# Patient Record
Sex: FEMALE | Race: WHITE | Employment: UNEMPLOYED | URBAN - METROPOLITAN AREA
[De-identification: names, ages, dates, MRNs, and addresses within clinical notes are randomized per-mention and may not be internally consistent; named-entity substitution may affect disease eponyms.]

---

## 2018-03-14 ENCOUNTER — HOSPITAL ENCOUNTER (OUTPATIENT)
Dept: PHYSICAL THERAPY | Age: 38
Setting detail: THERAPIES SERIES
Discharge: HOME | End: 2018-03-14
Payer: COMMERCIAL

## 2018-03-14 PROCEDURE — G0283 ELEC STIM OTHER THAN WOUND: HCPCS

## 2018-03-14 PROCEDURE — 97110 THERAPEUTIC EXERCISES: CPT

## 2018-03-16 ENCOUNTER — HOSPITAL ENCOUNTER (OUTPATIENT)
Dept: PHYSICAL THERAPY | Age: 38
Setting detail: THERAPIES SERIES
Discharge: HOME | End: 2018-03-16
Payer: COMMERCIAL

## 2018-03-16 PROCEDURE — 97110 THERAPEUTIC EXERCISES: CPT

## 2018-03-16 PROCEDURE — G0283 ELEC STIM OTHER THAN WOUND: HCPCS

## 2018-03-21 ENCOUNTER — HOSPITAL ENCOUNTER (OUTPATIENT)
Dept: PHYSICAL THERAPY | Age: 38
Setting detail: THERAPIES SERIES
Discharge: HOME | End: 2018-03-21
Payer: COMMERCIAL

## 2018-03-23 ENCOUNTER — HOSPITAL ENCOUNTER (OUTPATIENT)
Dept: PHYSICAL THERAPY | Age: 38
Setting detail: THERAPIES SERIES
Discharge: HOME | End: 2018-03-23
Payer: COMMERCIAL

## 2018-03-23 PROCEDURE — G0283 ELEC STIM OTHER THAN WOUND: HCPCS

## 2018-03-23 PROCEDURE — 97530 THERAPEUTIC ACTIVITIES: CPT

## 2018-03-26 ENCOUNTER — OFFICE VISIT (OUTPATIENT)
Dept: ORTHOPEDIC SURGERY | Age: 38
End: 2018-03-26
Payer: COMMERCIAL

## 2018-03-26 VITALS — HEIGHT: 63 IN | WEIGHT: 235 LBS | BODY MASS INDEX: 41.64 KG/M2

## 2018-03-26 DIAGNOSIS — M19.019 AC (ACROMIOCLAVICULAR) JOINT ARTHRITIS: Primary | ICD-10-CM

## 2018-03-26 DIAGNOSIS — M75.42 IMPINGEMENT SYNDROME OF LEFT SHOULDER: ICD-10-CM

## 2018-03-26 PROCEDURE — 1036F TOBACCO NON-USER: CPT | Performed by: ORTHOPAEDIC SURGERY

## 2018-03-26 PROCEDURE — G8417 CALC BMI ABV UP PARAM F/U: HCPCS | Performed by: ORTHOPAEDIC SURGERY

## 2018-03-26 PROCEDURE — 99214 OFFICE O/P EST MOD 30 MIN: CPT | Performed by: ORTHOPAEDIC SURGERY

## 2018-03-26 PROCEDURE — G8484 FLU IMMUNIZE NO ADMIN: HCPCS | Performed by: ORTHOPAEDIC SURGERY

## 2018-03-26 PROCEDURE — G8427 DOCREV CUR MEDS BY ELIG CLIN: HCPCS | Performed by: ORTHOPAEDIC SURGERY

## 2018-03-26 NOTE — PROGRESS NOTES
tiZANidine (ZANAFLEX) 4 MG tablet, Take 4 mg by mouth every 6 hours as needed, Disp: , Rfl:     amitriptyline (ELAVIL) 25 MG tablet, Take 25 mg by mouth nightly, Disp: , Rfl:     ibuprofen (ADVIL;MOTRIN) 600 MG tablet, Take 1 tablet by mouth every 6 hours as needed for Pain Take WITH Food / Meals. , Disp: 40 tablet, Rfl: 1    Elastic Bandages & Supports (KNEE BRACE) MISC, Hinged knee brace to left knee, Disp: 1 each, Rfl: 0    omeprazole (PRILOSEC) 40 MG delayed release capsule, Take 40 mg by mouth daily, Disp: , Rfl:     vitamin D (ERGOCALCIFEROL) 73293 UNITS CAPS capsule, Take 50,000 Units by mouth once a week, Disp: , Rfl:     topiramate (TOPAMAX) 50 MG tablet, Take 100 mg by mouth 2 times daily, Disp: , Rfl:     metFORMIN (GLUCOPHAGE) 500 MG tablet, Take 500 mg by mouth 2 times daily (with meals), Disp: , Rfl:     levothyroxine (SYNTHROID) 25 MCG tablet, Take 75 mcg by mouth Daily , Disp: , Rfl:     atenolol (TENORMIN) 25 MG tablet, Take 25 mg by mouth daily 1/2 tab daily, Disp: , Rfl:     traZODone (DESYREL) 50 MG tablet, Take 50 mg by mouth nightly as needed , Disp: , Rfl:     montelukast (SINGULAIR) 10 MG tablet, Take 10 mg by mouth nightly., Disp: , Rfl:     ALBUTEROL IN, Inhale  into the lungs as needed. , Disp: , Rfl:     valACYclovir (VALTREX) 500 MG tablet, Take 500 mg by mouth as needed , Disp: , Rfl:   Allergies   Allergen Reactions    Aspirin Nausea Only and Other (See Comments)     Heart rate goes up    Mobic [Meloxicam] Other (See Comments)     \"Almost a stroke, couldn't talk, right side numbness\"    Tape [Adhesive Tape] Other (See Comments)     \"skin spots\"    Cortisone Hives, Nausea And Vomiting and Other (See Comments)     All steroids; heart rate goes up, massive migraines       Darvocet A500 [Propoxyphene N-Acetaminophen] Hives, Nausea And Vomiting and Other (See Comments)     Massive migraines     Social History     Social History    Marital status:      Spouse name: N/A    Number of children: N/A    Years of education: N/A     Occupational History    Not on file. Social History Main Topics    Smoking status: Former Smoker     Years: 1.00     Types: Cigarettes     Quit date: 7/9/1998    Smokeless tobacco: Not on file    Alcohol use No    Drug use: No    Sexual activity: Not on file     Other Topics Concern    Not on file     Social History Narrative    No narrative on file     Family History   Problem Relation Age of Onset    Diabetes Mother    [place] Migraines Mother     Ovarian Cancer Sister     Mental Illness Other     Arthritis Other     Hypertension Other        Skin: (-) rash,(-) psoriasis,(-) eczema, (-)skin cancer. Musculoskeletal: (-) fractures,  (-) dislocations,(-) collagen vascular disease, (-) fibromyalgia, (-) multiple sclerosis, (-) muscular dystrophy, (-) RSD,(-) joint pain (-)swelling, (-) joint pain,swelling. Neurologic: (-) epilepsy, (-)seizures,(-) brain tumor,(-) TIA, (-)stroke, (-)headaches, (-)Parkinson disease,(-) memory loss, (-) LOC. Cardiovascular: (-) Chest pain, (-) swelling in legs/feet, (-) SOB, (-) cramping in legs/feet with walking. SUBJECTIVE:  _Ht 5' 3\" (1.6 m)   Wt 235 lb (106.6 kg)   BMI 41.63 kg/m²  Vital signs are stable. In general, patient is awake, alert and oriented X3, in no apparent distress. Examination of HENT reveals normocephalic, atraumatic. PERRLA/EOMI sclera are white. Conjunctivae are clear. TM's are intact. Pharynx is pink and moist.  Uvula and tongue are midline. Heart: Positive S1 and positive S2 with regular rate and rhythm. Lungs: Clear to auscultation bilaterally without rales, rhonchi or wheezes. Abdomen: soft, nontender. Positive bowel sounds. No organomegaly. No guarding or rigidity. Constitution:    The patient is alert and oriented x 3, appears to be stated age and in no distress. Ht. 5 ft 3 in., Wt. 235 lbs.     Skin:    Upon inspection: the skin appears warm, dry and intact. There is not a previous scar over the affected area. There is not any cellulitis, lymphedema or cutaneous lesions noted in the lower extremities. Upon palpation there is no induration noted. Neurologic:    Gait: normal;  Motor exam of the upper extremities show: The reflexes in biceps/triceps/brachioradialis are equal and symmetric. Sensory exam C5-T1 are normal bilaterally. Cardiovascular: The vascular exam is normal and is well perfused to distal extremities. There are 2+ radial pulses bilaterally, and motor and sensation is intact to median, ulnar, and radial, musclocutaneus, and axillary nerve distribution and grossly symmetric bilaterally. There is cap refill noted less than two seconds in all digits. There is not edema of the bilateral upper extremities. There is not varicosities noted in the distal extremities. Lymph:    Upon palpation,  there is no lymphadenopathy noted in bilateral upper extremities. Musculoskeletal:    Gait: normal; examination of the nails and digits reveal no cyanosis or clubbing. Cervical Exam:    On physical exam, Monico Pratt is well-developed, well-nourished, oriented to person, place and time. her gait is normal.  On evaluation of her cervical spine, she has full range of motion of the cervical spine without pain. There is no cervical tenderness to palpation. Shoulder Exam:      On evaluation of her bilaterally upper extremities, her left shoulder has no deformity. There is tenderness upon palpation of the There is not evidence of scapular dyskinesis. There is not muscle atrophy in shoulder girdle. The range of motion for the Right Shoulder is 150/45/t10 and for the Left shoulder is 90/30/bl. Right shoulder Motor strength is 5/5 in the supraspinatus, 5/5 internal rotation and 5/5 in external rotation, and Left shoulder motor strength 4-/5 in supraspinatus, 5/5 in internal rotation, 5/5 in external rotation.

## 2018-04-12 ENCOUNTER — ANESTHESIA EVENT (OUTPATIENT)
Dept: OPERATING ROOM | Age: 38
End: 2018-04-12
Payer: COMMERCIAL

## 2018-04-12 ENCOUNTER — HOSPITAL ENCOUNTER (OUTPATIENT)
Age: 38
Setting detail: OUTPATIENT SURGERY
Discharge: HOME | End: 2018-04-12
Attending: ORTHOPAEDIC SURGERY | Admitting: ORTHOPAEDIC SURGERY
Payer: COMMERCIAL

## 2018-04-12 ENCOUNTER — ANESTHESIA (OUTPATIENT)
Dept: OPERATING ROOM | Age: 38
End: 2018-04-12
Payer: COMMERCIAL

## 2018-04-12 VITALS
TEMPERATURE: 98 F | HEIGHT: 63 IN | OXYGEN SATURATION: 100 % | DIASTOLIC BLOOD PRESSURE: 79 MMHG | WEIGHT: 228 LBS | HEART RATE: 69 BPM | SYSTOLIC BLOOD PRESSURE: 115 MMHG | BODY MASS INDEX: 40.4 KG/M2 | RESPIRATION RATE: 16 BRPM

## 2018-04-12 VITALS
TEMPERATURE: 96.6 F | RESPIRATION RATE: 12 BRPM | DIASTOLIC BLOOD PRESSURE: 103 MMHG | OXYGEN SATURATION: 98 % | SYSTOLIC BLOOD PRESSURE: 137 MMHG

## 2018-04-12 PROBLEM — M75.42 IMPINGEMENT SYNDROME OF LEFT SHOULDER: Status: ACTIVE | Noted: 2018-04-12

## 2018-04-12 LAB — GLUCOSE BLD-MCNC: 97 MG/DL

## 2018-04-12 PROCEDURE — 2720000010 HC SURG SUPPLY STERILE: Performed by: ORTHOPAEDIC SURGERY

## 2018-04-12 PROCEDURE — 2580000003 HC RX 258: Performed by: ANESTHESIOLOGY

## 2018-04-12 PROCEDURE — 29823 SHO ARTHRS SRG XTNSV DBRDMT: CPT | Performed by: ORTHOPAEDIC SURGERY

## 2018-04-12 PROCEDURE — 3700000000 HC ANESTHESIA ATTENDED CARE: Performed by: ORTHOPAEDIC SURGERY

## 2018-04-12 PROCEDURE — 6360000002 HC RX W HCPCS: Performed by: ORTHOPAEDIC SURGERY

## 2018-04-12 PROCEDURE — 7100000001 HC PACU RECOVERY - ADDTL 15 MIN: Performed by: ORTHOPAEDIC SURGERY

## 2018-04-12 PROCEDURE — 6360000002 HC RX W HCPCS: Performed by: NURSE ANESTHETIST, CERTIFIED REGISTERED

## 2018-04-12 PROCEDURE — 2500000003 HC RX 250 WO HCPCS: Performed by: ORTHOPAEDIC SURGERY

## 2018-04-12 PROCEDURE — 29826 SHO ARTHRS SRG DECOMPRESSION: CPT | Performed by: ORTHOPAEDIC SURGERY

## 2018-04-12 PROCEDURE — 29824 SHO ARTHRS SRG DSTL CLAVICLC: CPT | Performed by: ORTHOPAEDIC SURGERY

## 2018-04-12 PROCEDURE — 2500000003 HC RX 250 WO HCPCS: Performed by: NURSE ANESTHETIST, CERTIFIED REGISTERED

## 2018-04-12 PROCEDURE — 6360000002 HC RX W HCPCS: Performed by: ANESTHESIOLOGY

## 2018-04-12 PROCEDURE — 2580000003 HC RX 258

## 2018-04-12 PROCEDURE — 7100000011 HC PHASE II RECOVERY - ADDTL 15 MIN: Performed by: ORTHOPAEDIC SURGERY

## 2018-04-12 PROCEDURE — 3600000014 HC SURGERY LEVEL 4 ADDTL 15MIN: Performed by: ORTHOPAEDIC SURGERY

## 2018-04-12 PROCEDURE — 7100000000 HC PACU RECOVERY - FIRST 15 MIN: Performed by: ORTHOPAEDIC SURGERY

## 2018-04-12 PROCEDURE — 82962 GLUCOSE BLOOD TEST: CPT | Performed by: ORTHOPAEDIC SURGERY

## 2018-04-12 PROCEDURE — 81025 URINE PREGNANCY TEST: CPT | Performed by: ORTHOPAEDIC SURGERY

## 2018-04-12 PROCEDURE — 7100000010 HC PHASE II RECOVERY - FIRST 15 MIN: Performed by: ORTHOPAEDIC SURGERY

## 2018-04-12 PROCEDURE — 3600000004 HC SURGERY LEVEL 4 BASE: Performed by: ORTHOPAEDIC SURGERY

## 2018-04-12 PROCEDURE — 3700000001 HC ADD 15 MINUTES (ANESTHESIA): Performed by: ORTHOPAEDIC SURGERY

## 2018-04-12 RX ORDER — GLYCOPYRROLATE 1 MG/5 ML
SYRINGE (ML) INTRAVENOUS PRN
Status: DISCONTINUED | OUTPATIENT
Start: 2018-04-12 | End: 2018-04-12 | Stop reason: SDUPTHER

## 2018-04-12 RX ORDER — ONDANSETRON 2 MG/ML
INJECTION INTRAMUSCULAR; INTRAVENOUS PRN
Status: DISCONTINUED | OUTPATIENT
Start: 2018-04-12 | End: 2018-04-12 | Stop reason: SDUPTHER

## 2018-04-12 RX ORDER — SODIUM CHLORIDE, SODIUM LACTATE, POTASSIUM CHLORIDE, CALCIUM CHLORIDE 600; 310; 30; 20 MG/100ML; MG/100ML; MG/100ML; MG/100ML
INJECTION, SOLUTION INTRAVENOUS CONTINUOUS
Status: DISCONTINUED | OUTPATIENT
Start: 2018-04-12 | End: 2018-04-12 | Stop reason: HOSPADM

## 2018-04-12 RX ORDER — SODIUM CHLORIDE, SODIUM LACTATE, POTASSIUM CHLORIDE, CALCIUM CHLORIDE 600; 310; 30; 20 MG/100ML; MG/100ML; MG/100ML; MG/100ML
INJECTION, SOLUTION INTRAVENOUS CONTINUOUS PRN
Status: DISCONTINUED | OUTPATIENT
Start: 2018-04-12 | End: 2018-04-12 | Stop reason: SDUPTHER

## 2018-04-12 RX ORDER — LIDOCAINE HYDROCHLORIDE 20 MG/ML
INJECTION, SOLUTION INFILTRATION; PERINEURAL PRN
Status: DISCONTINUED | OUTPATIENT
Start: 2018-04-12 | End: 2018-04-12 | Stop reason: SDUPTHER

## 2018-04-12 RX ORDER — MORPHINE SULFATE 2 MG/ML
1 INJECTION, SOLUTION INTRAMUSCULAR; INTRAVENOUS EVERY 5 MIN PRN
Status: DISCONTINUED | OUTPATIENT
Start: 2018-04-12 | End: 2018-04-12 | Stop reason: HOSPADM

## 2018-04-12 RX ORDER — LABETALOL HYDROCHLORIDE 5 MG/ML
5 INJECTION, SOLUTION INTRAVENOUS EVERY 10 MIN PRN
Status: DISCONTINUED | OUTPATIENT
Start: 2018-04-12 | End: 2018-04-12 | Stop reason: HOSPADM

## 2018-04-12 RX ORDER — PROPOFOL 10 MG/ML
INJECTION, EMULSION INTRAVENOUS PRN
Status: DISCONTINUED | OUTPATIENT
Start: 2018-04-12 | End: 2018-04-12 | Stop reason: SDUPTHER

## 2018-04-12 RX ORDER — LABETALOL HYDROCHLORIDE 5 MG/ML
INJECTION, SOLUTION INTRAVENOUS PRN
Status: DISCONTINUED | OUTPATIENT
Start: 2018-04-12 | End: 2018-04-12 | Stop reason: SDUPTHER

## 2018-04-12 RX ORDER — FENTANYL CITRATE 50 UG/ML
50 INJECTION, SOLUTION INTRAMUSCULAR; INTRAVENOUS EVERY 5 MIN PRN
Status: DISCONTINUED | OUTPATIENT
Start: 2018-04-12 | End: 2018-04-12 | Stop reason: HOSPADM

## 2018-04-12 RX ORDER — MIDAZOLAM HYDROCHLORIDE 1 MG/ML
INJECTION INTRAMUSCULAR; INTRAVENOUS PRN
Status: DISCONTINUED | OUTPATIENT
Start: 2018-04-12 | End: 2018-04-12 | Stop reason: SDUPTHER

## 2018-04-12 RX ORDER — HYDRALAZINE HYDROCHLORIDE 20 MG/ML
5 INJECTION INTRAMUSCULAR; INTRAVENOUS EVERY 10 MIN PRN
Status: DISCONTINUED | OUTPATIENT
Start: 2018-04-12 | End: 2018-04-12 | Stop reason: HOSPADM

## 2018-04-12 RX ORDER — BUPIVACAINE HYDROCHLORIDE AND EPINEPHRINE 2.5; 5 MG/ML; UG/ML
INJECTION, SOLUTION EPIDURAL; INFILTRATION; INTRACAUDAL; PERINEURAL PRN
Status: DISCONTINUED | OUTPATIENT
Start: 2018-04-12 | End: 2018-04-12 | Stop reason: HOSPADM

## 2018-04-12 RX ORDER — PROMETHAZINE HYDROCHLORIDE 25 MG/ML
25 INJECTION, SOLUTION INTRAMUSCULAR; INTRAVENOUS
Status: DISCONTINUED | OUTPATIENT
Start: 2018-04-12 | End: 2018-04-12 | Stop reason: HOSPADM

## 2018-04-12 RX ORDER — NEOSTIGMINE METHYLSULFATE 1 MG/ML
INJECTION, SOLUTION INTRAVENOUS PRN
Status: DISCONTINUED | OUTPATIENT
Start: 2018-04-12 | End: 2018-04-12 | Stop reason: SDUPTHER

## 2018-04-12 RX ORDER — MEPERIDINE HYDROCHLORIDE 25 MG/ML
12.5 INJECTION INTRAMUSCULAR; INTRAVENOUS; SUBCUTANEOUS EVERY 5 MIN PRN
Status: DISCONTINUED | OUTPATIENT
Start: 2018-04-12 | End: 2018-04-12 | Stop reason: HOSPADM

## 2018-04-12 RX ORDER — ROCURONIUM BROMIDE 10 MG/ML
INJECTION, SOLUTION INTRAVENOUS PRN
Status: DISCONTINUED | OUTPATIENT
Start: 2018-04-12 | End: 2018-04-12 | Stop reason: SDUPTHER

## 2018-04-12 RX ORDER — OXYCODONE HYDROCHLORIDE AND ACETAMINOPHEN 5; 325 MG/1; MG/1
2 TABLET ORAL EVERY 4 HOURS PRN
Status: DISCONTINUED | OUTPATIENT
Start: 2018-04-12 | End: 2018-04-12 | Stop reason: HOSPADM

## 2018-04-12 RX ORDER — FENTANYL CITRATE 50 UG/ML
INJECTION, SOLUTION INTRAMUSCULAR; INTRAVENOUS PRN
Status: DISCONTINUED | OUTPATIENT
Start: 2018-04-12 | End: 2018-04-12 | Stop reason: SDUPTHER

## 2018-04-12 RX ORDER — DIPHENHYDRAMINE HYDROCHLORIDE 50 MG/ML
12.5 INJECTION INTRAMUSCULAR; INTRAVENOUS
Status: DISCONTINUED | OUTPATIENT
Start: 2018-04-12 | End: 2018-04-12 | Stop reason: HOSPADM

## 2018-04-12 RX ADMIN — SODIUM CHLORIDE, POTASSIUM CHLORIDE, SODIUM LACTATE AND CALCIUM CHLORIDE: 600; 310; 30; 20 INJECTION, SOLUTION INTRAVENOUS at 12:34

## 2018-04-12 RX ADMIN — FENTANYL CITRATE 50 MCG: 50 INJECTION, SOLUTION INTRAMUSCULAR; INTRAVENOUS at 13:12

## 2018-04-12 RX ADMIN — SODIUM CHLORIDE, SODIUM LACTATE, POTASSIUM CHLORIDE, CALCIUM CHLORIDE: 600; 310; 30; 20 INJECTION, SOLUTION INTRAVENOUS at 12:40

## 2018-04-12 RX ADMIN — Medication 0.2 MG: at 13:13

## 2018-04-12 RX ADMIN — Medication 0.6 MG: at 13:41

## 2018-04-12 RX ADMIN — ONDANSETRON 4 MG: 2 INJECTION, SOLUTION INTRAMUSCULAR; INTRAVENOUS at 13:25

## 2018-04-12 RX ADMIN — MIDAZOLAM HYDROCHLORIDE 2 MG: 1 INJECTION INTRAMUSCULAR; INTRAVENOUS at 12:44

## 2018-04-12 RX ADMIN — Medication 3 MG: at 13:41

## 2018-04-12 RX ADMIN — FENTANYL CITRATE 100 MCG: 50 INJECTION, SOLUTION INTRAMUSCULAR; INTRAVENOUS at 12:46

## 2018-04-12 RX ADMIN — FENTANYL CITRATE 50 MCG: 50 INJECTION, SOLUTION INTRAMUSCULAR; INTRAVENOUS at 13:23

## 2018-04-12 RX ADMIN — MORPHINE SULFATE 1 MG: 2 INJECTION, SOLUTION INTRAMUSCULAR; INTRAVENOUS at 14:08

## 2018-04-12 RX ADMIN — LABETALOL HYDROCHLORIDE 10 MG: 5 INJECTION, SOLUTION INTRAVENOUS at 13:26

## 2018-04-12 RX ADMIN — LIDOCAINE HYDROCHLORIDE 30 MG: 20 INJECTION, SOLUTION INFILTRATION; PERINEURAL at 12:46

## 2018-04-12 RX ADMIN — PROPOFOL 200 MG: 10 INJECTION, EMULSION INTRAVENOUS at 12:46

## 2018-04-12 RX ADMIN — ROCURONIUM BROMIDE 35 MG: 10 INJECTION, SOLUTION INTRAVENOUS at 12:46

## 2018-04-12 RX ADMIN — FENTANYL CITRATE 50 MCG: 50 INJECTION, SOLUTION INTRAMUSCULAR; INTRAVENOUS at 13:00

## 2018-04-12 RX ADMIN — LABETALOL HYDROCHLORIDE 10 MG: 5 INJECTION, SOLUTION INTRAVENOUS at 13:18

## 2018-04-12 RX ADMIN — CEFAZOLIN SODIUM 2 G: 2 SOLUTION INTRAVENOUS at 12:42

## 2018-04-12 RX ADMIN — MORPHINE SULFATE 1 MG: 2 INJECTION, SOLUTION INTRAMUSCULAR; INTRAVENOUS at 14:13

## 2018-04-12 ASSESSMENT — PAIN DESCRIPTION - PAIN TYPE
TYPE: SURGICAL PAIN

## 2018-04-12 ASSESSMENT — PULMONARY FUNCTION TESTS
PIF_VALUE: 20
PIF_VALUE: 19
PIF_VALUE: 20
PIF_VALUE: 0
PIF_VALUE: 20
PIF_VALUE: 19
PIF_VALUE: 20
PIF_VALUE: 19
PIF_VALUE: 21
PIF_VALUE: 19
PIF_VALUE: 4
PIF_VALUE: 20
PIF_VALUE: 19
PIF_VALUE: 21
PIF_VALUE: 0
PIF_VALUE: 19
PIF_VALUE: 20
PIF_VALUE: 19
PIF_VALUE: 22
PIF_VALUE: 20
PIF_VALUE: 20
PIF_VALUE: 25
PIF_VALUE: 20
PIF_VALUE: 20
PIF_VALUE: 18
PIF_VALUE: 24
PIF_VALUE: 20
PIF_VALUE: 19
PIF_VALUE: 2
PIF_VALUE: 20
PIF_VALUE: 21
PIF_VALUE: 21
PIF_VALUE: 19
PIF_VALUE: 20
PIF_VALUE: 19
PIF_VALUE: 20
PIF_VALUE: 19
PIF_VALUE: 18
PIF_VALUE: 0
PIF_VALUE: 4
PIF_VALUE: 20
PIF_VALUE: 19
PIF_VALUE: 19
PIF_VALUE: 20
PIF_VALUE: 21
PIF_VALUE: 20
PIF_VALUE: 19

## 2018-04-12 ASSESSMENT — PAIN DESCRIPTION - DESCRIPTORS
DESCRIPTORS: ACHING
DESCRIPTORS: ACHING;DISCOMFORT
DESCRIPTORS: DISCOMFORT

## 2018-04-12 ASSESSMENT — PAIN SCALES - GENERAL
PAINLEVEL_OUTOF10: 8
PAINLEVEL_OUTOF10: 6
PAINLEVEL_OUTOF10: 6
PAINLEVEL_OUTOF10: 7

## 2018-04-12 ASSESSMENT — PAIN DESCRIPTION - ORIENTATION
ORIENTATION: LEFT

## 2018-04-12 ASSESSMENT — PAIN DESCRIPTION - LOCATION
LOCATION: SHOULDER
LOCATION: SHOULDER

## 2018-04-12 ASSESSMENT — LIFESTYLE VARIABLES: SMOKING_STATUS: 0

## 2018-04-12 ASSESSMENT — PAIN - FUNCTIONAL ASSESSMENT: PAIN_FUNCTIONAL_ASSESSMENT: 0-10

## 2018-04-12 ASSESSMENT — PAIN DESCRIPTION - FREQUENCY: FREQUENCY: CONTINUOUS

## 2018-04-12 ASSESSMENT — PAIN DESCRIPTION - PROGRESSION: CLINICAL_PROGRESSION: GRADUALLY IMPROVING

## 2018-04-25 DIAGNOSIS — M75.42 IMPINGEMENT SYNDROME OF LEFT SHOULDER: Primary | ICD-10-CM

## 2018-04-26 ENCOUNTER — OFFICE VISIT (OUTPATIENT)
Dept: ORTHOPEDIC SURGERY | Age: 38
End: 2018-04-26

## 2018-04-26 VITALS — WEIGHT: 228 LBS | BODY MASS INDEX: 41.96 KG/M2 | TEMPERATURE: 98 F | HEIGHT: 62 IN

## 2018-04-26 DIAGNOSIS — M75.112 INCOMPLETE TEAR OF LEFT ROTATOR CUFF: ICD-10-CM

## 2018-04-26 DIAGNOSIS — M75.42 IMPINGEMENT SYNDROME OF LEFT SHOULDER: Primary | ICD-10-CM

## 2018-04-26 DIAGNOSIS — M19.019 AC (ACROMIOCLAVICULAR) JOINT ARTHRITIS: ICD-10-CM

## 2018-04-26 PROCEDURE — 99024 POSTOP FOLLOW-UP VISIT: CPT | Performed by: ORTHOPAEDIC SURGERY

## 2018-06-19 ENCOUNTER — HOSPITAL ENCOUNTER (OUTPATIENT)
Age: 38
Discharge: HOME | End: 2018-06-19
Payer: COMMERCIAL

## 2018-06-19 LAB
RHEUMATOID FACTOR: <10 IU/ML (ref 0–13)
SEDIMENTATION RATE, ERYTHROCYTE: 5 MM/HR (ref 0–20)
URIC ACID, SERUM: 3.4 MG/DL (ref 2.4–5.7)

## 2018-06-19 PROCEDURE — 86431 RHEUMATOID FACTOR QUANT: CPT

## 2018-06-19 PROCEDURE — 84550 ASSAY OF BLOOD/URIC ACID: CPT

## 2018-06-19 PROCEDURE — 86039 ANTINUCLEAR ANTIBODIES (ANA): CPT

## 2018-06-19 PROCEDURE — 85651 RBC SED RATE NONAUTOMATED: CPT

## 2018-06-19 PROCEDURE — 36415 COLL VENOUS BLD VENIPUNCTURE: CPT

## 2018-06-19 PROCEDURE — 86038 ANTINUCLEAR ANTIBODIES: CPT

## 2018-06-20 LAB
ANA PATTERN: ABNORMAL
ANA TITER: ABNORMAL
ANTI-NUCLEAR ANTIBODY (ANA): POSITIVE

## 2018-07-31 ENCOUNTER — APPOINTMENT (OUTPATIENT)
Dept: GENERAL RADIOLOGY | Age: 38
End: 2018-07-31
Payer: COMMERCIAL

## 2018-07-31 ENCOUNTER — HOSPITAL ENCOUNTER (EMERGENCY)
Age: 38
Discharge: HOME | End: 2018-07-31
Attending: EMERGENCY MEDICINE
Payer: COMMERCIAL

## 2018-07-31 VITALS
OXYGEN SATURATION: 100 % | BODY MASS INDEX: 40.48 KG/M2 | TEMPERATURE: 97.8 F | HEART RATE: 59 BPM | HEIGHT: 62 IN | WEIGHT: 220 LBS | DIASTOLIC BLOOD PRESSURE: 69 MMHG | SYSTOLIC BLOOD PRESSURE: 117 MMHG | RESPIRATION RATE: 14 BRPM

## 2018-07-31 DIAGNOSIS — R07.89 COSTOCHONDRAL CHEST PAIN: Primary | ICD-10-CM

## 2018-07-31 LAB
ALBUMIN SERPL-MCNC: 3.9 G/DL (ref 3.5–5.2)
ALP BLD-CCNC: 62 U/L (ref 35–104)
ALT SERPL-CCNC: 12 U/L (ref 0–32)
ANION GAP SERPL CALCULATED.3IONS-SCNC: 12 MMOL/L (ref 7–16)
AST SERPL-CCNC: 15 U/L (ref 0–31)
BASOPHILS ABSOLUTE: 0.02 E9/L (ref 0–0.2)
BASOPHILS RELATIVE PERCENT: 0.4 % (ref 0–2)
BILIRUB SERPL-MCNC: 0.3 MG/DL (ref 0–1.2)
BUN BLDV-MCNC: 14 MG/DL (ref 6–20)
CALCIUM SERPL-MCNC: 9.3 MG/DL (ref 8.6–10.2)
CHLORIDE BLD-SCNC: 103 MMOL/L (ref 98–107)
CO2: 22 MMOL/L (ref 22–29)
CREAT SERPL-MCNC: 1 MG/DL (ref 0.5–1)
D DIMER: 257 NG/ML DDU
EKG ATRIAL RATE: 52 BPM
EKG P AXIS: 8 DEGREES
EKG P-R INTERVAL: 152 MS
EKG Q-T INTERVAL: 448 MS
EKG QRS DURATION: 78 MS
EKG QTC CALCULATION (BAZETT): 416 MS
EKG R AXIS: -3 DEGREES
EKG T AXIS: 7 DEGREES
EKG VENTRICULAR RATE: 52 BPM
EOSINOPHILS ABSOLUTE: 0.16 E9/L (ref 0.05–0.5)
EOSINOPHILS RELATIVE PERCENT: 3.1 % (ref 0–6)
GFR AFRICAN AMERICAN: >60
GFR NON-AFRICAN AMERICAN: >60 ML/MIN/1.73
GLUCOSE BLD-MCNC: 103 MG/DL (ref 74–109)
HCG QUALITATIVE: NEGATIVE
HCT VFR BLD CALC: 41.8 % (ref 34–48)
HEMOGLOBIN: 13.6 G/DL (ref 11.5–15.5)
IMMATURE GRANULOCYTES #: 0.02 E9/L
IMMATURE GRANULOCYTES %: 0.4 % (ref 0–5)
INR BLD: 1.2
LYMPHOCYTES ABSOLUTE: 0.89 E9/L (ref 1.5–4)
LYMPHOCYTES RELATIVE PERCENT: 17.3 % (ref 20–42)
MAGNESIUM: 2 MG/DL (ref 1.6–2.6)
MCH RBC QN AUTO: 30.9 PG (ref 26–35)
MCHC RBC AUTO-ENTMCNC: 32.5 % (ref 32–34.5)
MCV RBC AUTO: 95 FL (ref 80–99.9)
MONOCYTES ABSOLUTE: 0.32 E9/L (ref 0.1–0.95)
MONOCYTES RELATIVE PERCENT: 6.2 % (ref 2–12)
NEUTROPHILS ABSOLUTE: 3.72 E9/L (ref 1.8–7.3)
NEUTROPHILS RELATIVE PERCENT: 72.6 % (ref 43–80)
PDW BLD-RTO: 12.9 FL (ref 11.5–15)
PLATELET # BLD: 180 E9/L (ref 130–450)
PMV BLD AUTO: 12.2 FL (ref 7–12)
POTASSIUM SERPL-SCNC: 4.4 MMOL/L (ref 3.5–5)
PRO-BNP: 161 PG/ML (ref 0–125)
PROTHROMBIN TIME: 13.6 SEC (ref 9.3–12.4)
RBC # BLD: 4.4 E12/L (ref 3.5–5.5)
SODIUM BLD-SCNC: 137 MMOL/L (ref 132–146)
TOTAL PROTEIN: 6.8 G/DL (ref 6.4–8.3)
TROPONIN: <0.01 NG/ML (ref 0–0.03)
WBC # BLD: 5.1 E9/L (ref 4.5–11.5)

## 2018-07-31 PROCEDURE — 85378 FIBRIN DEGRADE SEMIQUANT: CPT

## 2018-07-31 PROCEDURE — 84703 CHORIONIC GONADOTROPIN ASSAY: CPT

## 2018-07-31 PROCEDURE — 83880 ASSAY OF NATRIURETIC PEPTIDE: CPT

## 2018-07-31 PROCEDURE — 93005 ELECTROCARDIOGRAM TRACING: CPT | Performed by: EMERGENCY MEDICINE

## 2018-07-31 PROCEDURE — 83735 ASSAY OF MAGNESIUM: CPT

## 2018-07-31 PROCEDURE — 85610 PROTHROMBIN TIME: CPT

## 2018-07-31 PROCEDURE — 84484 ASSAY OF TROPONIN QUANT: CPT

## 2018-07-31 PROCEDURE — 85025 COMPLETE CBC W/AUTO DIFF WBC: CPT

## 2018-07-31 PROCEDURE — 80053 COMPREHEN METABOLIC PANEL: CPT

## 2018-07-31 PROCEDURE — 71045 X-RAY EXAM CHEST 1 VIEW: CPT

## 2018-07-31 PROCEDURE — 99285 EMERGENCY DEPT VISIT HI MDM: CPT

## 2018-07-31 RX ORDER — ONDANSETRON 2 MG/ML
4 INJECTION INTRAMUSCULAR; INTRAVENOUS ONCE
Status: DISCONTINUED | OUTPATIENT
Start: 2018-07-31 | End: 2018-07-31 | Stop reason: HOSPADM

## 2018-07-31 RX ORDER — BUSPIRONE HYDROCHLORIDE 15 MG/1
15 TABLET ORAL 3 TIMES DAILY
COMMUNITY
End: 2019-12-20 | Stop reason: ALTCHOICE

## 2018-07-31 ASSESSMENT — PAIN SCALES - GENERAL: PAINLEVEL_OUTOF10: 6

## 2018-07-31 ASSESSMENT — PAIN DESCRIPTION - LOCATION: LOCATION: CHEST

## 2018-07-31 ASSESSMENT — PAIN DESCRIPTION - PAIN TYPE: TYPE: ACUTE PAIN

## 2018-07-31 NOTE — ED NOTES
Patient discharged with belongings. Discussed care instructions, follow-up instructions and when to return to the hospital. Patient verbalizes understanding and has no further questions at this time. Electronically signed by Dell Forbes.  BLANCA Martinez on 7/31/2018 at 4:24 PM       Danika Cleary RN  07/31/18 [id number]

## 2018-07-31 NOTE — ED PROVIDER NOTES
04/12/2018); and pr shoulder scope bone shaving (Left, 4/12/2018). Social History:  reports that she quit smoking about 20 years ago. Her smoking use included Cigarettes. She quit after 1.00 year of use. She has never used smokeless tobacco. She reports that she does not drink alcohol or use drugs. Family History: family history includes Arthritis in an other family member; Diabetes in her mother; Hypertension in an other family member; Mental Illness in an other family member; Migraines in her mother; Ovarian Cancer in her sister. The patients home medications have been reviewed. Allergies: Aspirin; Mobic [meloxicam]; Tape [adhesive tape];  Cortisone; and Darvocet a500 [propoxyphene n-acetaminophen]    -------------------------------------------------- RESULTS -------------------------------------------------  All laboratory and radiology results have been personally reviewed by myself   LABS:  Results for orders placed or performed during the hospital encounter of 07/31/18   CBC Auto Differential   Result Value Ref Range    WBC 5.1 4.5 - 11.5 E9/L    RBC 4.40 3.50 - 5.50 E12/L    Hemoglobin 13.6 11.5 - 15.5 g/dL    Hematocrit 41.8 34.0 - 48.0 %    MCV 95.0 80.0 - 99.9 fL    MCH 30.9 26.0 - 35.0 pg    MCHC 32.5 32.0 - 34.5 %    RDW 12.9 11.5 - 15.0 fL    Platelets 787 159 - 326 E9/L    MPV 12.2 (H) 7.0 - 12.0 fL    Neutrophils % 72.6 43.0 - 80.0 %    Immature Granulocytes % 0.4 0.0 - 5.0 %    Lymphocytes % 17.3 (L) 20.0 - 42.0 %    Monocytes % 6.2 2.0 - 12.0 %    Eosinophils % 3.1 0.0 - 6.0 %    Basophils % 0.4 0.0 - 2.0 %    Neutrophils # 3.72 1.80 - 7.30 E9/L    Immature Granulocytes # 0.02 E9/L    Lymphocytes # 0.89 (L) 1.50 - 4.00 E9/L    Monocytes # 0.32 0.10 - 0.95 E9/L    Eosinophils # 0.16 0.05 - 0.50 E9/L    Basophils # 0.02 0.00 - 0.20 E9/L   Comprehensive Metabolic Panel   Result Value Ref Range    Sodium 137 132 - 146 mmol/L    Potassium 4.4 3.5 - 5.0 mmol/L    Chloride 103 98 - 107 mmol/L Oropharynx clear, handling secretions, no trismus  Neck: Supple, full ROM,   Pulmonary: Lungs clear to auscultation bilaterally, no wheezes, rales, or rhonchi. Not in respiratory distress  Cardiovascular:  Regular rate and rhythm, no murmurs, gallops, or rubs. 2+ distal pulses  Abdomen: Soft, non tender, non distended,   Extremities: Moves all extremities x 4. Warm and well perfused  Skin: warm and dry without rash  Neurologic: GCS 15,  Psych: Normal Affect      ------------------------------ ED COURSE/MEDICAL DECISION MAKING----------------------  Medications - No data to display  EKG: This EKG is signed and interpreted by me. Time: 1438  Rate: 52  Rhythm: Sinus  Interpretation: no acute changes  Comparison: no previous EKG available      ED COURSE:       Medical Decision Making:    Patient presents with costochondritis chest pain this is reproducible chest pain. We did blood work and EKG seemed stable. We recommended continuing NSAIDs and follow up with primary care physician within 2-3 days for reevaluation. We gave the patient warning signs for when to return. Counseling: The emergency provider has spoken with the patient and discussed todays results, in addition to providing specific details for the plan of care and counseling regarding the diagnosis and prognosis. Questions are answered at this time and they are agreeable with the plan.      --------------------------------- IMPRESSION AND DISPOSITION ---------------------------------    IMPRESSION  1. Costochondral chest pain        DISPOSITION  Disposition: Discharge to home  Patient condition is good      NOTE: This report was transcribed using voice recognition software.  Every effort was made to ensure accuracy; however, inadvertent computerized transcription errors may be present       Silvestre Grissom DO  08/01/18 [id number]

## 2018-08-30 ENCOUNTER — OFFICE VISIT (OUTPATIENT)
Dept: CARDIOLOGY CLINIC | Age: 38
End: 2018-08-30
Payer: COMMERCIAL

## 2018-08-30 VITALS
SYSTOLIC BLOOD PRESSURE: 102 MMHG | WEIGHT: 225 LBS | HEIGHT: 63 IN | BODY MASS INDEX: 39.87 KG/M2 | HEART RATE: 68 BPM | DIASTOLIC BLOOD PRESSURE: 64 MMHG | RESPIRATION RATE: 18 BRPM

## 2018-08-30 DIAGNOSIS — J45.909 REACTIVE AIRWAY DISEASE WITHOUT COMPLICATION, UNSPECIFIED ASTHMA SEVERITY, UNSPECIFIED WHETHER PERSISTENT: ICD-10-CM

## 2018-08-30 DIAGNOSIS — E78.00 PURE HYPERCHOLESTEROLEMIA: ICD-10-CM

## 2018-08-30 DIAGNOSIS — E11.9 TYPE 2 DIABETES MELLITUS WITHOUT COMPLICATION, WITHOUT LONG-TERM CURRENT USE OF INSULIN (HCC): ICD-10-CM

## 2018-08-30 DIAGNOSIS — I10 ESSENTIAL HYPERTENSION: ICD-10-CM

## 2018-08-30 DIAGNOSIS — E66.01 MORBID OBESITY (HCC): ICD-10-CM

## 2018-08-30 DIAGNOSIS — R07.89 ATYPICAL CHEST PAIN: Primary | ICD-10-CM

## 2018-08-30 PROCEDURE — 93000 ELECTROCARDIOGRAM COMPLETE: CPT | Performed by: INTERNAL MEDICINE

## 2018-08-30 PROCEDURE — 2022F DILAT RTA XM EVC RTNOPTHY: CPT | Performed by: INTERNAL MEDICINE

## 2018-08-30 PROCEDURE — 99204 OFFICE O/P NEW MOD 45 MIN: CPT | Performed by: INTERNAL MEDICINE

## 2018-08-30 PROCEDURE — G8417 CALC BMI ABV UP PARAM F/U: HCPCS | Performed by: INTERNAL MEDICINE

## 2018-08-30 PROCEDURE — 1036F TOBACCO NON-USER: CPT | Performed by: INTERNAL MEDICINE

## 2018-08-30 PROCEDURE — 3046F HEMOGLOBIN A1C LEVEL >9.0%: CPT | Performed by: INTERNAL MEDICINE

## 2018-08-30 PROCEDURE — G8427 DOCREV CUR MEDS BY ELIG CLIN: HCPCS | Performed by: INTERNAL MEDICINE

## 2018-08-30 RX ORDER — ALBUTEROL SULFATE 90 UG/1
AEROSOL, METERED RESPIRATORY (INHALATION)
COMMUNITY
Start: 2017-12-03 | End: 2019-08-23

## 2018-08-30 NOTE — PROGRESS NOTES
status: Former Smoker     Years: 1.00     Types: Cigarettes     Quit date: 7/9/1998    Smokeless tobacco: Never Used    Alcohol use No      Comment: Socially. Coffee rarely     Drug use: No    Sexual activity: Not on file     Other Topics Concern    Not on file     Social History Narrative    No narrative on file       Allergies: Allergies   Allergen Reactions    Aspirin Nausea Only and Other (See Comments)     Heart rate goes up    Gabapentin      Dizziness    Mobic [Meloxicam] Other (See Comments)     \"Almost a stroke, couldn't talk, right side numbness\"    Propoxyphene      hives    Tape [Adhesive Tape] Other (See Comments)     \"skin spots\"    Cortisone Hives, Nausea And Vomiting and Other (See Comments)     All steroids; heart rate goes up, massive migraines       Darvocet A500 [Propoxyphene N-Acetaminophen] Hives, Nausea And Vomiting and Other (See Comments)     Massive migraines       Current Medications:  Current Outpatient Prescriptions   Medication Sig Dispense Refill    albuterol sulfate HFA (VENTOLIN HFA) 108 (90 Base) MCG/ACT inhaler Inhale into the lungs      busPIRone (BUSPAR) 15 MG tablet Take 15 mg by mouth 3 times daily      diclofenac sodium 1 % GEL Apply 2 g topically 4 times daily as needed for Pain      etodolac (LODINE) 300 MG capsule Take 300 mg by mouth every 8 hours      oxyCODONE-acetaminophen (PERCOCET) 5-325 MG per tablet Take 1 tablet by mouth every 4 hours as needed for Pain .       tiZANidine (ZANAFLEX) 4 MG tablet Take 4 mg by mouth every 6 hours as needed      amitriptyline (ELAVIL) 25 MG tablet Take 25 mg by mouth nightly      Elastic Bandages & Supports (KNEE BRACE) MISC Hinged knee brace to left knee 1 each 0    omeprazole (PRILOSEC) 40 MG delayed release capsule Take 40 mg by mouth daily      vitamin D (ERGOCALCIFEROL) 26530 UNITS CAPS capsule Take 50,000 Units by mouth once a week      topiramate (TOPAMAX) 50 MG tablet Take 100 mg by mouth 2 times daily  metFORMIN (GLUCOPHAGE) 500 MG tablet Take 500 mg by mouth 2 times daily (with meals)      levothyroxine (SYNTHROID) 25 MCG tablet Take 112 mcg by mouth Daily       atenolol (TENORMIN) 25 MG tablet Take 25 mg by mouth daily 1/2 tab daily      traZODone (DESYREL) 50 MG tablet Take 50 mg by mouth nightly as needed       montelukast (SINGULAIR) 10 MG tablet Take 10 mg by mouth nightly.  ALBUTEROL IN Inhale  into the lungs as needed.  valACYclovir (VALTREX) 500 MG tablet Take 500 mg by mouth as needed        Current Facility-Administered Medications   Medication Dose Route Frequency Provider Last Rate Last Dose    ceFAZolin (ANCEF) 2 g in dextrose 5% 100 mL IVPB  2 g Intravenous Once Lisa Hyde DO             Physical Exam:  /64   Pulse 68   Resp 18   Ht 5' 3\" (1.6 m)   Wt 225 lb (102.1 kg)   LMP 07/10/2018   BMI 39.86 kg/m²   Wt Readings from Last 3 Encounters:   08/30/18 225 lb (102.1 kg)   07/31/18 220 lb (99.8 kg)   04/26/18 228 lb (103.4 kg)     The patient is awake, alert and in no discomfort or distress. No gross musculoskeletal deformity or lymphadenopathy are present. No significant skin or nail changes are present. Gross examination of head, eyes, nose and throat are negative. Jugular venous pressure is normal and no carotid bruits are present. No thyromegaly is noted. Normal respiratory effort is noted with no accessory muscle usage present. Lung fields are clear to ascultation. Cardiac examination is notable for a regular rate and rhythm with no palpable thrill. No gallop rhythm or cardiac murmur are identified. A benign abdominal examination is present with the exception of obesity and no masses or organomegaly. A normal abdominal aortic pulsation is present. Intact pulses are present throughout all extremities and no peripheral edema is present. No focal neurologic deficits are present.     Laboratory Tests:  Lab Results   Component Value Date    CREATININE 1.0 07/31/2018    BUN 14 07/31/2018     07/31/2018    K 4.4 07/31/2018     07/31/2018    CO2 22 07/31/2018     No results found for: BNP  Lab Results   Component Value Date    WBC 5.1 07/31/2018    RBC 4.40 07/31/2018    HGB 13.6 07/31/2018    HCT 41.8 07/31/2018    MCV 95.0 07/31/2018    MCH 30.9 07/31/2018    MCHC 32.5 07/31/2018    RDW 12.9 07/31/2018     07/31/2018    MPV 12.2 07/31/2018     No results for input(s): ALKPHOS, ALT, AST, PROT, BILITOT, BILIDIR, LABALBU in the last 72 hours. Lab Results   Component Value Date    MG 2.0 07/31/2018     Lab Results   Component Value Date    PROTIME 13.6 07/31/2018    INR 1.2 07/31/2018     Lab Results   Component Value Date    TSH 5.100 09/12/2016     No components found for: CHLPL  Lab Results   Component Value Date    TRIG 176 (H) 09/12/2016    TRIG 42 01/16/2014    TRIG 69 08/24/2013     Lab Results   Component Value Date    HDL 58 09/12/2016    HDL 67 01/16/2014    HDL 33.0 (A) 08/24/2013     Lab Results   Component Value Date    LDLCALC 136 (H) 09/12/2016    LDLCALC 158 (H) 01/16/2014    LDLCALC 65 08/24/2013       Cardiac Tests:  ECG: A resting electrocardiogram demonstrates evidence of sinus rhythm with left axis deviation and nonspecific ST changes  Chest X-ray: Chest x-ray of July, 2018 demonstrated no evidence of cardiomegaly or infiltrate      ASSESSMENT / PLAN: On a clinical basis, the patient presents with evidence of atypically described and prolonged episodes of chest discomfort in the face of a known chronic pain syndrome. Her present symptoms are not suggestive of myocardial ischemia or a clear defined cardiovascular source which has been discussed at time for evaluation.  I have not presently recommended additional cardiovascular assessment and have extensively discussed her needs of appropriate lifestyle modification to achieve weight reduction which will benefit diastolic cardiac performance and reduce her risk of the development of obstructive sleep apnea with its associated adverse cardiovascular effects. Continued aggressive risk factor modification of blood pressure diabetes and hyperlipidemia will be advisable to reduce risk of future atherosclerotic development and I would recommend the initiation of high-dose atorvastatin or rosuvastatin to assist in this regard. In addition the adverse effects of long-term anti-inflammatory therapy need be considered. I will presently return her to your care and would happily reassess her in the future should additional cardiovascular difficulties or concerns arise. Follow-up office visit as needed should additional cardiovascular difficulties or concerns arise    Thank you for allowing me to participate in your patient's care. Please feel free to contact me if you have any questions or concerns. Carmelina Cardenas.  Howie Dougherty, [id number] [facility]

## 2019-04-16 ENCOUNTER — HOSPITAL ENCOUNTER (OUTPATIENT)
Dept: GENERAL RADIOLOGY | Age: 39
Discharge: HOME | End: 2019-04-18
Payer: COMMERCIAL

## 2019-04-16 ENCOUNTER — HOSPITAL ENCOUNTER (OUTPATIENT)
Age: 39
Discharge: HOME | End: 2019-04-18
Payer: COMMERCIAL

## 2019-04-16 DIAGNOSIS — M25.561 RIGHT KNEE PAIN, UNSPECIFIED CHRONICITY: ICD-10-CM

## 2019-04-16 PROCEDURE — 73562 X-RAY EXAM OF KNEE 3: CPT

## 2019-06-12 ENCOUNTER — APPOINTMENT (OUTPATIENT)
Dept: CT IMAGING | Age: 39
End: 2019-06-12
Payer: COMMERCIAL

## 2019-06-12 ENCOUNTER — HOSPITAL ENCOUNTER (EMERGENCY)
Age: 39
Discharge: HOME | End: 2019-06-12
Attending: EMERGENCY MEDICINE
Payer: COMMERCIAL

## 2019-06-12 VITALS
OXYGEN SATURATION: 97 % | WEIGHT: 230 LBS | SYSTOLIC BLOOD PRESSURE: 135 MMHG | RESPIRATION RATE: 16 BRPM | TEMPERATURE: 98.5 F | BODY MASS INDEX: 40.74 KG/M2 | DIASTOLIC BLOOD PRESSURE: 108 MMHG | HEART RATE: 90 BPM

## 2019-06-12 DIAGNOSIS — K52.9 COLITIS: Primary | ICD-10-CM

## 2019-06-12 LAB
BASOPHILS ABSOLUTE: 0.03 E9/L (ref 0–0.2)
BASOPHILS RELATIVE PERCENT: 0.6 % (ref 0–2)
BILIRUBIN URINE: NEGATIVE
BLOOD, URINE: NEGATIVE
CLARITY: CLEAR
CO2: 20 MMOL/L (ref 22–29)
COLOR: YELLOW
EOSINOPHILS ABSOLUTE: 0.13 E9/L (ref 0.05–0.5)
EOSINOPHILS RELATIVE PERCENT: 2.4 % (ref 0–6)
GFR AFRICAN AMERICAN: >60
GFR NON-AFRICAN AMERICAN: >60 ML/MIN/1.73
GLUCOSE BLD-MCNC: 107 MG/DL (ref 74–99)
GLUCOSE URINE: NEGATIVE MG/DL
HCG(URINE) PREGNANCY TEST: NEGATIVE
HCT VFR BLD CALC: 44.2 % (ref 34–48)
HEMOGLOBIN: 14.2 G/DL (ref 11.5–15.5)
IMMATURE GRANULOCYTES #: 0.02 E9/L
IMMATURE GRANULOCYTES %: 0.4 % (ref 0–5)
KETONES, URINE: NEGATIVE MG/DL
LEUKOCYTE ESTERASE, URINE: NEGATIVE
LYMPHOCYTES ABSOLUTE: 1.33 E9/L (ref 1.5–4)
LYMPHOCYTES RELATIVE PERCENT: 24.7 % (ref 20–42)
MCH RBC QN AUTO: 30.3 PG (ref 26–35)
MCHC RBC AUTO-ENTMCNC: 32.1 % (ref 32–34.5)
MCV RBC AUTO: 94.4 FL (ref 80–99.9)
MONOCYTES ABSOLUTE: 0.35 E9/L (ref 0.1–0.95)
MONOCYTES RELATIVE PERCENT: 6.5 % (ref 2–12)
NEUTROPHILS ABSOLUTE: 3.52 E9/L (ref 1.8–7.3)
NEUTROPHILS RELATIVE PERCENT: 65.4 % (ref 43–80)
NITRITE, URINE: NEGATIVE
PDW BLD-RTO: 13.1 FL (ref 11.5–15)
PH UA: 7 (ref 5–9)
PLATELET # BLD: 210 E9/L (ref 130–450)
PMV BLD AUTO: 11.9 FL (ref 7–12)
POC ANION GAP: 10 MMOL/L (ref 7–16)
POC BUN: 14 MG/DL (ref 8–23)
POC CHLORIDE: 108 MMOL/L (ref 100–108)
POC CREATININE: 1 MG/DL (ref 0.5–1)
POC POTASSIUM: 4.4 MMOL/L (ref 3.5–5)
POC SODIUM: 138 MMOL/L (ref 132–146)
PROTEIN UA: NEGATIVE MG/DL
RBC # BLD: 4.68 E12/L (ref 3.5–5.5)
SPECIFIC GRAVITY UA: 1.01 (ref 1–1.03)
UROBILINOGEN, URINE: 0.2 E.U./DL
WBC # BLD: 5.4 E9/L (ref 4.5–11.5)

## 2019-06-12 PROCEDURE — 36415 COLL VENOUS BLD VENIPUNCTURE: CPT

## 2019-06-12 PROCEDURE — 81025 URINE PREGNANCY TEST: CPT

## 2019-06-12 PROCEDURE — 85025 COMPLETE CBC W/AUTO DIFF WBC: CPT

## 2019-06-12 PROCEDURE — 82565 ASSAY OF CREATININE: CPT

## 2019-06-12 PROCEDURE — 81003 URINALYSIS AUTO W/O SCOPE: CPT

## 2019-06-12 PROCEDURE — 74176 CT ABD & PELVIS W/O CONTRAST: CPT

## 2019-06-12 PROCEDURE — 84520 ASSAY OF UREA NITROGEN: CPT

## 2019-06-12 PROCEDURE — 80051 ELECTROLYTE PANEL: CPT

## 2019-06-12 PROCEDURE — 6360000004 HC RX CONTRAST MEDICATION: Performed by: RADIOLOGY

## 2019-06-12 PROCEDURE — 82947 ASSAY GLUCOSE BLOOD QUANT: CPT

## 2019-06-12 PROCEDURE — 99284 EMERGENCY DEPT VISIT MOD MDM: CPT

## 2019-06-12 RX ORDER — CIPROFLOXACIN 500 MG/1
500 TABLET, FILM COATED ORAL 2 TIMES DAILY
Qty: 20 TABLET | Refills: 0 | Status: SHIPPED | OUTPATIENT
Start: 2019-06-12 | End: 2019-06-22

## 2019-06-12 RX ORDER — METRONIDAZOLE 500 MG/1
500 TABLET ORAL 2 TIMES DAILY
Qty: 20 TABLET | Refills: 0 | Status: SHIPPED | OUTPATIENT
Start: 2019-06-12 | End: 2019-06-22

## 2019-06-12 RX ADMIN — IOHEXOL 50 ML: 240 INJECTION, SOLUTION INTRATHECAL; INTRAVASCULAR; INTRAVENOUS; ORAL at 17:57

## 2019-06-12 ASSESSMENT — ENCOUNTER SYMPTOMS
EYE PAIN: 0
WHEEZING: 0
VOMITING: 0
DIARRHEA: 0
ABDOMINAL DISTENTION: 0
CONSTIPATION: 0
NAUSEA: 1
SHORTNESS OF BREATH: 0
ABDOMINAL PAIN: 1
BACK PAIN: 0
SINUS PRESSURE: 0
EYE DISCHARGE: 0
COUGH: 0
EYE REDNESS: 0
SORE THROAT: 0

## 2019-06-12 ASSESSMENT — PAIN SCALES - GENERAL: PAINLEVEL_OUTOF10: 8

## 2019-06-12 ASSESSMENT — PAIN DESCRIPTION - LOCATION: LOCATION: ABDOMEN

## 2019-06-12 ASSESSMENT — PAIN DESCRIPTION - PAIN TYPE: TYPE: ACUTE PAIN

## 2019-06-12 ASSESSMENT — PAIN DESCRIPTION - FREQUENCY: FREQUENCY: CONTINUOUS

## 2019-06-12 ASSESSMENT — PAIN DESCRIPTION - DESCRIPTORS
DESCRIPTORS: SHARP
DESCRIPTORS: PRESSURE

## 2019-06-12 ASSESSMENT — PAIN DESCRIPTION - PROGRESSION
CLINICAL_PROGRESSION: NOT CHANGED
CLINICAL_PROGRESSION: NOT CHANGED

## 2019-06-12 ASSESSMENT — PAIN - FUNCTIONAL ASSESSMENT: PAIN_FUNCTIONAL_ASSESSMENT: PREVENTS OR INTERFERES SOME ACTIVE ACTIVITIES AND ADLS

## 2019-06-12 ASSESSMENT — PAIN DESCRIPTION - ORIENTATION: ORIENTATION: RIGHT;LOWER

## 2019-06-12 NOTE — ED PROVIDER NOTES
Concerned about ovarian cysts and tubal pregnancy    The history is provided by the patient. Abdominal Pain   Pain location:  RLQ  Pain quality: aching    Pain radiates to:  Does not radiate  Pain severity:  Moderate  Onset quality:  Gradual  Duration:  3 days  Chronicity:  New  Associated symptoms: fatigue and nausea    Associated symptoms: no chest pain, no chills, no constipation, no cough, no diarrhea, no dysuria, no fever, no shortness of breath, no sore throat and no vomiting        Review of Systems   Constitutional: Positive for fatigue. Negative for chills and fever. HENT: Negative for ear pain, sinus pressure and sore throat. Eyes: Negative for pain, discharge and redness. Respiratory: Negative for cough, shortness of breath and wheezing. Cardiovascular: Negative for chest pain. Gastrointestinal: Positive for abdominal pain and nausea. Negative for abdominal distention, constipation, diarrhea and vomiting. Genitourinary: Negative for dysuria and frequency. Musculoskeletal: Negative for arthralgias and back pain. Skin: Negative for rash and wound. Neurological: Negative for weakness and headaches. Hematological: Negative for adenopathy. All other systems reviewed and are negative. Physical Exam   Constitutional: She is oriented to person, place, and time. She appears well-developed and well-nourished. HENT:   Head: Normocephalic and atraumatic. Right Ear: Hearing and external ear normal.   Left Ear: Hearing and external ear normal.   Nose: Nose normal.   Mouth/Throat: Uvula is midline, oropharynx is clear and moist and mucous membranes are normal.   Eyes: Pupils are equal, round, and reactive to light. Conjunctivae, EOM and lids are normal.   Neck: Normal range of motion. Neck supple. Cardiovascular: Normal rate, regular rhythm and normal heart sounds. No murmur heard. Pulmonary/Chest: Effort normal and breath sounds normal.   Abdominal: Soft.  Bowel sounds are normal. There is tenderness in the right lower quadrant. There is no rigidity, no rebound, no guarding and no CVA tenderness. Musculoskeletal: She exhibits no edema. Neurological: She is alert and oriented to person, place, and time. She has normal strength. No cranial nerve deficit or sensory deficit. Coordination and gait normal. GCS eye subscore is 4. GCS verbal subscore is 5. GCS motor subscore is 6. Skin: Skin is warm and dry. No abrasion and no rash noted. Nursing note and vitals reviewed. Procedures    MDM            --------------------------------------------- PAST HISTORY ---------------------------------------------  Past Medical History:  has a past medical history of Asthma, Back pain, Cervical disc disease, Depression, Diabetes mellitus ([address] Ectopic pregnancy, Fibromyalgia, Fibromyalgia, GERD (gastroesophageal reflux disease), Heart murmur, Herpes genitalia, History of blood transfusion, Hypertension, Lupus ([address] Migraines, Migraines, Nausea & vomiting, Ovarian cyst, PONV (postoperative nausea and vomiting), Rheumatoid arthritis ([address] SLE (systemic lupus erythematosus) ([address] and Thyroid disease. Past Surgical History:  has a past surgical history that includes Cervical spine surgery; Dilation and curettage of uterus; Tubal ligation; Tonsillectomy; ECHO Compl W Dop Color Flow (8/24/2013); other surgical history; cervical fusion (07/30/2014); Knee arthroscopy (Right, 2014); Knee arthroscopy (Right, 04 01 2016); Nerve Block (Bilateral, 12/06/2016); Knee arthroscopy (Left, 12/16/2016); shoulder surgery (Left, 04/12/2018); and pr shoulder scope bone shaving (Left, 4/12/2018). Social History:  reports that she quit smoking about 20 years ago. Her smoking use included cigarettes. She quit after 1.00 year of use. She has never used smokeless tobacco. She reports that she does not drink alcohol or use drugs.     Family History: family history includes Arthritis in an other family Basophils # 0.03 0.00 - 0.20 E9/L   POCT Venous   Result Value Ref Range    POC Sodium 138 132 - 146 mmol/L    POC Potassium 4.4 3.5 - 5.0 mmol/L    POC Chloride 108 100 - 108 mmol/L    CO2 20 (L) 22 - 29 mmol/L    POC Anion Gap 10 7 - 16 mmol/L    POC Glucose 107 (H) 74 - 99 mg/dl    POC BUN 14 8 - 23 mg/dL    POC Creatinine 1.0 0.5 - 1.0 mg/dL    GFR Non-African American >60 >=60 mL/min/1.73    GFR  >60        Radiology:  CT ABDOMEN PELVIS WO CONTRAST Additional Contrast? Oral   Final Result      Nonspecific mild descending and proximal sigmoid colitis. Ct Abdomen Pelvis Wo Contrast Additional Contrast? Oral    Result Date: 6/12/2019  Location: 200 CT ABDOMEN AND PELVIS WITHOUT IV CONTRAST Clinical indication: Abdominal pain for 3 days Contrast: 50 cc of Omnipaque 240 oral contrast. Comparison was made with previous study from September 6, 2014 TECHNIQUE; Serial axial images through the abdomen and pelvis were obtained without IV contrast and coronal and sagittal reformatted images also obtained. CT technique includes automated exposure control. FINDINGS; Limited lung bases are unremarkable. No hiatal hernia. Evaluation of visualized solid abdominal organs is limited due to lack of IV contrast.  The liver is homogenous and unremarkable without focal lesion. The gallbladder is unremarkable with no pericholecystic fluid collection. No abnormal intra or extrahepatic biliary ductal dilatation. The spleen is homogenous and unremarkable. The pancreas is unremarkable The adrenals are unremarkable bilaterally. The right kidney shows no evidence of hydronephrosis or perinephric fluid. The left kidney shows no evidence of hydronephrosis or perinephric fluid. No significant nephrolithiasis on either side. No signs of acute appendicitis. The descending and proximal sigmoid colon shows mild bilateral thickening in the absence of diverticulosis.  The visualized bowel loops are otherwise grossly unremarkable. The visualized mesentery is clear. No free air or fluid in the abdomen. No free air or fluid in the pelvis. No grossly enlarged retroperitoneal lymphadenopathy. The abdominal aorta is normal in caliber. The urinary bladder is unremarkable . No significant adnexal abnormality. No inguinal hernia on either side. Nonspecific mild descending and proximal sigmoid colitis.       ------------------------- NURSING NOTES AND VITALS REVIEWED ---------------------------  Date / Time Roomed:  6/12/2019  2:56 PM  ED Bed Assignment:  TIFFANIE/TIFFANIE    The nursing notes within the ED encounter and vital signs as below have been reviewed. BP (!) 135/108   Pulse 90   Temp 98.5 °F (36.9 °C) (Oral)   Resp 16   Wt 230 lb (104.3 kg)   LMP 05/20/2019   SpO2 97%   BMI 40.74 kg/m²   Oxygen Saturation Interpretation: Normal      ------------------------------------------ PROGRESS NOTES ------------------------------------------  I have spoken with the patient and discussed todays results, in addition to providing specific details for the plan of care and counseling regarding the diagnosis and prognosis. Their questions are answered at this time and they are agreeable with the plan. I discussed at length with them reasons for immediate return here for re evaluation. They will followup with primary care by calling their office tomorrow. --------------------------------- ADDITIONAL PROVIDER NOTES ---------------------------------  At this time the patient is without objective evidence of an acute process requiring hospitalization or inpatient management. They have remained hemodynamically stable throughout their entire ED visit and are stable for discharge with outpatient follow-up. The plan has been discussed in detail and they are aware of the specific conditions for emergent return, as well as the importance of follow-up.       New Prescriptions    CIPROFLOXACIN (CIPRO) 500 MG TABLET    Take 1 tablet by mouth 2 times daily for 10 days    METRONIDAZOLE (FLAGYL) 500 MG TABLET    Take 1 tablet by mouth 2 times daily for 10 days     Patient's Medications   New Prescriptions    CIPROFLOXACIN (CIPRO) 500 MG TABLET    Take 1 tablet by mouth 2 times daily for 10 days    METRONIDAZOLE (FLAGYL) 500 MG TABLET    Take 1 tablet by mouth 2 times daily for 10 days   Previous Medications    ALBUTEROL IN    Inhale  into the lungs as needed. ALBUTEROL SULFATE HFA (VENTOLIN HFA) 108 (90 BASE) MCG/ACT INHALER    Inhale into the lungs    AMITRIPTYLINE (ELAVIL) 25 MG TABLET    Take 25 mg by mouth nightly    ATENOLOL (TENORMIN) 25 MG TABLET    Take 25 mg by mouth daily 1/2 tab daily    BUSPIRONE (BUSPAR) 15 MG TABLET    Take 15 mg by mouth 3 times daily    DICLOFENAC SODIUM 1 % GEL    Apply 2 g topically 4 times daily as needed for Pain    ELASTIC BANDAGES & SUPPORTS (KNEE BRACE) MISC    Hinged knee brace to left knee    ETODOLAC (LODINE) 300 MG CAPSULE    Take 300 mg by mouth every 8 hours    LEVOTHYROXINE (SYNTHROID) 25 MCG TABLET    Take 112 mcg by mouth Daily     METFORMIN (GLUCOPHAGE) 500 MG TABLET    Take 500 mg by mouth 2 times daily (with meals)    MONTELUKAST (SINGULAIR) 10 MG TABLET    Take 10 mg by mouth nightly. OMEPRAZOLE (PRILOSEC) 40 MG DELAYED RELEASE CAPSULE    Take 40 mg by mouth daily    OXYCODONE-ACETAMINOPHEN (PERCOCET) 5-325 MG PER TABLET    Take 1 tablet by mouth every 4 hours as needed for Pain .     TIZANIDINE (ZANAFLEX) 4 MG TABLET    Take 4 mg by mouth every 6 hours as needed    TOPIRAMATE (TOPAMAX) 50 MG TABLET    Take 100 mg by mouth 2 times daily    TRAZODONE (DESYREL) 50 MG TABLET    Take 50 mg by mouth nightly as needed     VALACYCLOVIR (VALTREX) 500 MG TABLET    Take 500 mg by mouth as needed     VITAMIN D (ERGOCALCIFEROL) 33614 UNITS CAPS CAPSULE    Take 50,000 Units by mouth once a week   Modified Medications    No medications on file   Discontinued Medications    No medications on file Diagnosis:  1. Colitis        Disposition:  Patient's disposition: Discharge to home  Patient's condition is stable.          Cadence Lyons MD  06/12/19 [id number]

## 2019-06-12 NOTE — ED NOTES
Pt to go directly to CT at [facility] for further testing. Pt is to stay over there until results received.       Katey Rosenthal RN  06/12/19 [id number]

## 2019-06-17 ENCOUNTER — APPOINTMENT (OUTPATIENT)
Dept: GENERAL RADIOLOGY | Age: 39
End: 2019-06-17
Payer: COMMERCIAL

## 2019-06-17 ENCOUNTER — HOSPITAL ENCOUNTER (EMERGENCY)
Age: 39
Discharge: HOME | End: 2019-06-17
Attending: EMERGENCY MEDICINE
Payer: COMMERCIAL

## 2019-06-17 VITALS
HEART RATE: 65 BPM | WEIGHT: 230 LBS | BODY MASS INDEX: 40.75 KG/M2 | DIASTOLIC BLOOD PRESSURE: 69 MMHG | TEMPERATURE: 97.8 F | SYSTOLIC BLOOD PRESSURE: 99 MMHG | HEIGHT: 63 IN | RESPIRATION RATE: 16 BRPM | OXYGEN SATURATION: 100 %

## 2019-06-17 DIAGNOSIS — K59.00 CONSTIPATION, UNSPECIFIED CONSTIPATION TYPE: ICD-10-CM

## 2019-06-17 DIAGNOSIS — K52.9 COLITIS: ICD-10-CM

## 2019-06-17 DIAGNOSIS — R10.84 GENERALIZED ABDOMINAL PAIN: ICD-10-CM

## 2019-06-17 DIAGNOSIS — R11.2 NON-INTRACTABLE VOMITING WITH NAUSEA, UNSPECIFIED VOMITING TYPE: Primary | ICD-10-CM

## 2019-06-17 LAB
ALBUMIN SERPL-MCNC: 4 G/DL (ref 3.5–5.2)
ALP BLD-CCNC: 68 U/L (ref 35–104)
ALT SERPL-CCNC: 13 U/L (ref 0–32)
ANION GAP SERPL CALCULATED.3IONS-SCNC: 11 MMOL/L (ref 7–16)
AST SERPL-CCNC: 13 U/L (ref 0–31)
BACTERIA: ABNORMAL /HPF
BASOPHILS ABSOLUTE: 0.02 E9/L (ref 0–0.2)
BASOPHILS RELATIVE PERCENT: 0.4 % (ref 0–2)
BILIRUB SERPL-MCNC: 0.3 MG/DL (ref 0–1.2)
BILIRUBIN URINE: NEGATIVE
BLOOD, URINE: NEGATIVE
BUN BLDV-MCNC: 15 MG/DL (ref 6–20)
CALCIUM SERPL-MCNC: 9.2 MG/DL (ref 8.6–10.2)
CHLORIDE BLD-SCNC: 108 MMOL/L (ref 98–107)
CLARITY: CLEAR
CO2: 21 MMOL/L (ref 22–29)
COLOR: YELLOW
CREAT SERPL-MCNC: 1 MG/DL (ref 0.5–1)
EOSINOPHILS ABSOLUTE: 0.05 E9/L (ref 0.05–0.5)
EOSINOPHILS RELATIVE PERCENT: 1.1 % (ref 0–6)
EPITHELIAL CELLS, UA: ABNORMAL /HPF
GFR AFRICAN AMERICAN: >60
GFR NON-AFRICAN AMERICAN: >60 ML/MIN/1.73
GLUCOSE BLD-MCNC: 90 MG/DL (ref 74–99)
GLUCOSE URINE: NEGATIVE MG/DL
HCG, URINE, POC: NEGATIVE
HCT VFR BLD CALC: 43.7 % (ref 34–48)
HEMOGLOBIN: 14.2 G/DL (ref 11.5–15.5)
IMMATURE GRANULOCYTES #: 0.02 E9/L
IMMATURE GRANULOCYTES %: 0.4 % (ref 0–5)
KETONES, URINE: NEGATIVE MG/DL
LACTIC ACID: 1.6 MMOL/L (ref 0.5–2.2)
LEUKOCYTE ESTERASE, URINE: NEGATIVE
LIPASE: 47 U/L (ref 13–60)
LYMPHOCYTES ABSOLUTE: 1.12 E9/L (ref 1.5–4)
LYMPHOCYTES RELATIVE PERCENT: 24.8 % (ref 20–42)
Lab: NORMAL
MCH RBC QN AUTO: 30.5 PG (ref 26–35)
MCHC RBC AUTO-ENTMCNC: 32.5 % (ref 32–34.5)
MCV RBC AUTO: 93.8 FL (ref 80–99.9)
MONOCYTES ABSOLUTE: 0.37 E9/L (ref 0.1–0.95)
MONOCYTES RELATIVE PERCENT: 8.2 % (ref 2–12)
NEGATIVE QC PASS/FAIL: NORMAL
NEUTROPHILS ABSOLUTE: 2.93 E9/L (ref 1.8–7.3)
NEUTROPHILS RELATIVE PERCENT: 65.1 % (ref 43–80)
NITRITE, URINE: NEGATIVE
PDW BLD-RTO: 12.9 FL (ref 11.5–15)
PH UA: 5.5 (ref 5–9)
PLATELET # BLD: 214 E9/L (ref 130–450)
PMV BLD AUTO: 11.9 FL (ref 7–12)
POSITIVE QC PASS/FAIL: NORMAL
POTASSIUM SERPL-SCNC: 4.6 MMOL/L (ref 3.5–5)
PROTEIN UA: NEGATIVE MG/DL
RBC # BLD: 4.66 E12/L (ref 3.5–5.5)
RBC UA: ABNORMAL /HPF (ref 0–2)
REASON FOR REJECTION: NORMAL
REJECTED TEST: NORMAL
SODIUM BLD-SCNC: 140 MMOL/L (ref 132–146)
SPECIFIC GRAVITY UA: >=1.03 (ref 1–1.03)
TOTAL PROTEIN: 6.8 G/DL (ref 6.4–8.3)
UROBILINOGEN, URINE: 0.2 E.U./DL
WBC # BLD: 4.5 E9/L (ref 4.5–11.5)
WBC UA: ABNORMAL /HPF (ref 0–5)

## 2019-06-17 PROCEDURE — 83690 ASSAY OF LIPASE: CPT

## 2019-06-17 PROCEDURE — 80053 COMPREHEN METABOLIC PANEL: CPT

## 2019-06-17 PROCEDURE — 85025 COMPLETE CBC W/AUTO DIFF WBC: CPT

## 2019-06-17 PROCEDURE — 2580000003 HC RX 258: Performed by: EMERGENCY MEDICINE

## 2019-06-17 PROCEDURE — 99284 EMERGENCY DEPT VISIT MOD MDM: CPT

## 2019-06-17 PROCEDURE — 74019 RADEX ABDOMEN 2 VIEWS: CPT

## 2019-06-17 PROCEDURE — 96375 TX/PRO/DX INJ NEW DRUG ADDON: CPT

## 2019-06-17 PROCEDURE — 36415 COLL VENOUS BLD VENIPUNCTURE: CPT

## 2019-06-17 PROCEDURE — 96374 THER/PROPH/DIAG INJ IV PUSH: CPT

## 2019-06-17 PROCEDURE — 83605 ASSAY OF LACTIC ACID: CPT

## 2019-06-17 PROCEDURE — 96372 THER/PROPH/DIAG INJ SC/IM: CPT

## 2019-06-17 PROCEDURE — 6360000002 HC RX W HCPCS: Performed by: EMERGENCY MEDICINE

## 2019-06-17 PROCEDURE — 81001 URINALYSIS AUTO W/SCOPE: CPT

## 2019-06-17 RX ORDER — 0.9 % SODIUM CHLORIDE 0.9 %
1000 INTRAVENOUS SOLUTION INTRAVENOUS ONCE
Status: COMPLETED | OUTPATIENT
Start: 2019-06-17 | End: 2019-06-17

## 2019-06-17 RX ORDER — KETOROLAC TROMETHAMINE 30 MG/ML
30 INJECTION, SOLUTION INTRAMUSCULAR; INTRAVENOUS ONCE
Status: COMPLETED | OUTPATIENT
Start: 2019-06-17 | End: 2019-06-17

## 2019-06-17 RX ORDER — DICYCLOMINE HYDROCHLORIDE 10 MG/ML
20 INJECTION INTRAMUSCULAR ONCE
Status: COMPLETED | OUTPATIENT
Start: 2019-06-17 | End: 2019-06-17

## 2019-06-17 RX ORDER — POLYETHYLENE GLYCOL 3350 17 G/17G
17 POWDER, FOR SOLUTION ORAL DAILY PRN
Qty: 527 G | Refills: 0 | Status: SHIPPED | OUTPATIENT
Start: 2019-06-17 | End: 2019-06-27

## 2019-06-17 RX ORDER — ONDANSETRON 2 MG/ML
4 INJECTION INTRAMUSCULAR; INTRAVENOUS ONCE
Status: COMPLETED | OUTPATIENT
Start: 2019-06-17 | End: 2019-06-17

## 2019-06-17 RX ORDER — ONDANSETRON 4 MG/1
4 TABLET, ORALLY DISINTEGRATING ORAL EVERY 8 HOURS PRN
Qty: 10 TABLET | Refills: 0 | Status: SHIPPED | OUTPATIENT
Start: 2019-06-17 | End: 2019-09-13

## 2019-06-17 RX ADMIN — SODIUM CHLORIDE 1000 ML: 9 INJECTION, SOLUTION INTRAVENOUS at 16:47

## 2019-06-17 RX ADMIN — ONDANSETRON 4 MG: 2 INJECTION INTRAMUSCULAR; INTRAVENOUS at 16:49

## 2019-06-17 RX ADMIN — KETOROLAC TROMETHAMINE 30 MG: 30 INJECTION, SOLUTION INTRAMUSCULAR; INTRAVENOUS at 19:58

## 2019-06-17 RX ADMIN — DICYCLOMINE HYDROCHLORIDE 20 MG: 20 INJECTION, SOLUTION INTRAMUSCULAR at 16:50

## 2019-06-17 ASSESSMENT — PAIN DESCRIPTION - LOCATION: LOCATION: ABDOMEN

## 2019-06-17 ASSESSMENT — ENCOUNTER SYMPTOMS
ABDOMINAL DISTENTION: 0
ABDOMINAL PAIN: 1
NAUSEA: 1
DIARRHEA: 0
VOMITING: 1
EYE PAIN: 0
EYE REDNESS: 0
WHEEZING: 0
BLOOD IN STOOL: 0
CONSTIPATION: 0
BACK PAIN: 0
COUGH: 0
SHORTNESS OF BREATH: 0
RHINORRHEA: 0
SORE THROAT: 0

## 2019-06-17 ASSESSMENT — PAIN DESCRIPTION - PAIN TYPE: TYPE: ACUTE PAIN

## 2019-06-17 ASSESSMENT — PAIN SCALES - GENERAL
PAINLEVEL_OUTOF10: 7
PAINLEVEL_OUTOF10: 6
PAINLEVEL_OUTOF10: 7

## 2019-06-17 NOTE — ED PROVIDER NOTES
and vomiting. Patient reports she was recently diagnosed with colitis. Physical exam reveals no acute distress with mild generalized tenderness to palpation. No surgical abdomen or peritoneal signs. Encounter from a few days ago at urgent care was reviewed. He notes he is considered are bowel perforation, constipation, obstruction, UTI. Obtained repeat labs which revealed no significant abnormality. Lipase and lactic are within normal limits. Significant leukocytosis. Abdominal x-ray reveals no acute findings. Bowel gas pattern is normal.  Patient was complaining also of some constipation. Feel her symptoms are most likely due to constipation as well as colitis and may even be due to her 2 antibiotics that she is taking causing a gastritis. Patient will be given prescription for Zofran and MiraLAX. She was instructed to stay hydrated drink plenty of fluids and to follow-up with her family doctor as well as GI doctor. She was told to return to the ED for any worsening symptoms. Her vital signs are within appropriate limits upon discharge. Amount and/or Complexity of Data Reviewed  Clinical lab tests: reviewed  Tests in the radiology section of CPT®: reviewed      ED Course as of Jun 17 2137 Mon Jun 17, 2019   1803 Reevaluated, no distress. Continues to complain of a bloating sensation.    [AD]   2010 Evaluated patient and her symptoms are improving. She has had no reports of vomiting here in the ED. Reports nausea has improved. Discussed results and plan with patient she is comfortable to go home. Will prescribe Zofran for her nausea and vomiting and to MiraLAX for her constipation. Recommended that she stay hydrated drink plenty of fluids and to follow-up with her family doctor within 2 to 3 days and to return to the ED if her symptoms worsen. Reports she does have an appointment with her gastroenterologist later this month.   Questions were answered.    [AD]      ED Course User -------------------------------------------------  Labs:  Results for orders placed or performed during the hospital encounter of 06/17/19   CBC auto differential   Result Value Ref Range    WBC 4.5 4.5 - 11.5 E9/L    RBC 4.66 3.50 - 5.50 E12/L    Hemoglobin 14.2 11.5 - 15.5 g/dL    Hematocrit 43.7 34.0 - 48.0 %    MCV 93.8 80.0 - 99.9 fL    MCH 30.5 26.0 - 35.0 pg    MCHC 32.5 32.0 - 34.5 %    RDW 12.9 11.5 - 15.0 fL    Platelets 043 010 - 221 E9/L    MPV 11.9 7.0 - 12.0 fL    Neutrophils % 65.1 43.0 - 80.0 %    Immature Granulocytes % 0.4 0.0 - 5.0 %    Lymphocytes % 24.8 20.0 - 42.0 %    Monocytes % 8.2 2.0 - 12.0 %    Eosinophils % 1.1 0.0 - 6.0 %    Basophils % 0.4 0.0 - 2.0 %    Neutrophils # 2.93 1.80 - 7.30 E9/L    Immature Granulocytes # 0.02 E9/L    Lymphocytes # 1.12 (L) 1.50 - 4.00 E9/L    Monocytes # 0.37 0.10 - 0.95 E9/L    Eosinophils # 0.05 0.05 - 0.50 E9/L    Basophils # 0.02 0.00 - 0.20 E9/L   Lactic Acid, Plasma   Result Value Ref Range    Lactic Acid 1.6 0.5 - 2.2 mmol/L   Urinalysis with Microscopic   Result Value Ref Range    Color, UA Yellow Straw/Yellow    Clarity, UA Clear Clear    Glucose, Ur Negative Negative mg/dL    Bilirubin Urine Negative Negative    Ketones, Urine Negative Negative mg/dL    Specific Gravity, UA >=1.030 1.005 - 1.030    Blood, Urine Negative Negative    pH, UA 5.5 5.0 - 9.0    Protein, UA Negative Negative mg/dL    Urobilinogen, Urine 0.2 <2.0 E.U./dL    Nitrite, Urine Negative Negative    Leukocyte Esterase, Urine Negative Negative    WBC, UA 0-1 0 - 5 /HPF    RBC, UA NONE 0 - 2 /HPF    Epi Cells FEW /HPF    Bacteria, UA RARE (A) /HPF   SPECIMEN REJECTION   Result Value Ref Range    Rejected Test cmp,lipase     Reason for Rejection see below    Comprehensive Metabolic Panel   Result Value Ref Range    Sodium 140 132 - 146 mmol/L    Potassium 4.6 3.5 - 5.0 mmol/L    Chloride 108 (H) 98 - 107 mmol/L    CO2 21 (L) 22 - 29 mmol/L    Anion Gap 11 7 - 16 mmol/L    Glucose answered.    [AD]      ED Course User Index  [AD] Mouna Freeman DO         I have spoken with the patient and discussed todays results, in addition to providing specific details for the plan of care and counseling regarding the diagnosis and prognosis. Their questions are answered at this time and they are agreeable with the plan. I discussed at length with them reasons for immediate return here for re evaluation. They will followup with primary care by calling their office tomorrow. --------------------------------- ADDITIONAL PROVIDER NOTES ---------------------------------  At this time the patient is without objective evidence of an acute process requiring hospitalization or inpatient management. They have remained hemodynamically stable throughout their entire ED visit and are stable for discharge with outpatient follow-up. The plan has been discussed in detail and they are aware of the specific conditions for emergent return, as well as the importance of follow-up. Discharge Medication List as of 6/17/2019  8:25 PM      START taking these medications    Details   ondansetron (ZOFRAN ODT) 4 MG disintegrating tablet Take 1 tablet by mouth every 8 hours as needed for Nausea or Vomiting, Disp-10 tablet, R-0Print      polyethylene glycol (MIRALAX) packet Take 17 g by mouth daily as needed for Constipation, Disp-527 g, R-0Print             Diagnosis:  1. Non-intractable vomiting with nausea, unspecified vomiting type    2. Generalized abdominal pain    3. Colitis    4. Constipation, unspecified constipation type        Disposition:  Patient's disposition: Discharge to home  Patient's condition is stable.          Mouna Freeman DO  Resident  06/17/19 3047

## 2019-08-15 ENCOUNTER — HOSPITAL ENCOUNTER (OUTPATIENT)
Dept: NEUROLOGY | Age: 39
Discharge: HOME | End: 2019-08-15
Payer: COMMERCIAL

## 2019-08-15 VITALS — WEIGHT: 230 LBS | HEIGHT: 63 IN | BODY MASS INDEX: 40.75 KG/M2

## 2019-08-15 PROCEDURE — 95912 NRV CNDJ TEST 11-12 STUDIES: CPT | Performed by: PHYSICAL MEDICINE & REHABILITATION

## 2019-08-15 PROCEDURE — 95886 MUSC TEST DONE W/N TEST COMP: CPT

## 2019-08-15 PROCEDURE — 95913 NRV CNDJ TEST 13/> STUDIES: CPT

## 2019-08-15 PROCEDURE — 95886 MUSC TEST DONE W/N TEST COMP: CPT | Performed by: PHYSICAL MEDICINE & REHABILITATION

## 2019-08-23 ENCOUNTER — HOSPITAL ENCOUNTER (EMERGENCY)
Age: 39
Discharge: HOME | End: 2019-08-23
Attending: EMERGENCY MEDICINE
Payer: COMMERCIAL

## 2019-08-23 ENCOUNTER — APPOINTMENT (OUTPATIENT)
Dept: GENERAL RADIOLOGY | Age: 39
End: 2019-08-23
Payer: COMMERCIAL

## 2019-08-23 VITALS
TEMPERATURE: 97.8 F | DIASTOLIC BLOOD PRESSURE: 55 MMHG | SYSTOLIC BLOOD PRESSURE: 100 MMHG | HEART RATE: 68 BPM | BODY MASS INDEX: 40.74 KG/M2 | RESPIRATION RATE: 16 BRPM | OXYGEN SATURATION: 98 % | WEIGHT: 230 LBS

## 2019-08-23 DIAGNOSIS — M75.51 ACUTE BURSITIS OF RIGHT SHOULDER: Primary | ICD-10-CM

## 2019-08-23 PROCEDURE — 73030 X-RAY EXAM OF SHOULDER: CPT

## 2019-08-23 PROCEDURE — G0382 LEV 3 HOSP TYPE B ED VISIT: HCPCS

## 2019-08-23 RX ORDER — SIMVASTATIN 10 MG
5 TABLET ORAL NIGHTLY
COMMUNITY
End: 2021-02-11 | Stop reason: DRUGHIGH

## 2019-08-23 ASSESSMENT — PAIN DESCRIPTION - ORIENTATION: ORIENTATION: RIGHT

## 2019-08-23 ASSESSMENT — PAIN DESCRIPTION - PAIN TYPE: TYPE: ACUTE PAIN

## 2019-08-23 ASSESSMENT — PAIN DESCRIPTION - PROGRESSION
CLINICAL_PROGRESSION: NOT CHANGED
CLINICAL_PROGRESSION: NOT CHANGED

## 2019-08-23 ASSESSMENT — PAIN SCALES - GENERAL: PAINLEVEL_OUTOF10: 6

## 2019-08-23 ASSESSMENT — PAIN DESCRIPTION - FREQUENCY: FREQUENCY: CONTINUOUS

## 2019-08-23 ASSESSMENT — ENCOUNTER SYMPTOMS
EYES NEGATIVE: 1
RESPIRATORY NEGATIVE: 1
GASTROINTESTINAL NEGATIVE: 1
ALLERGIC/IMMUNOLOGIC NEGATIVE: 1

## 2019-08-23 ASSESSMENT — PAIN DESCRIPTION - LOCATION: LOCATION: SHOULDER

## 2019-08-23 ASSESSMENT — PAIN DESCRIPTION - DESCRIPTORS: DESCRIPTORS: SHOOTING

## 2019-08-23 NOTE — ED PROVIDER NOTES
Lifting  Heavy cat litter bags; right shoulder tender with ROM or palpation           Review of Systems   Constitutional: Positive for activity change. HENT: Negative. Eyes: Negative. Respiratory: Negative. Cardiovascular: Negative. Gastrointestinal: Negative. Endocrine: Negative. Genitourinary: Negative. Musculoskeletal: Positive for arthralgias and joint swelling. Skin: Negative. Allergic/Immunologic: Negative. Neurological: Negative. Hematological: Negative. Psychiatric/Behavioral: Negative. All other systems reviewed and are negative. Physical Exam   Constitutional: She is oriented to person, place, and time. She appears well-developed and well-nourished. HENT:   Head: Normocephalic. Eyes: Pupils are equal, round, and reactive to light. Neck: Normal range of motion. Cardiovascular: Normal rate and regular rhythm. Pulmonary/Chest: Effort normal and breath sounds normal.   Abdominal: Soft. Bowel sounds are normal.   Musculoskeletal: She exhibits tenderness. Right shoulder: She exhibits decreased range of motion, tenderness and pain. Neurological: She is alert and oriented to person, place, and time. Skin: Skin is warm and dry. Capillary refill takes less than 2 seconds. Psychiatric: She has a normal mood and affect.      --------------------------------------------- PAST HISTORY ---------------------------------------------  Past Medical History:  has a past medical history of Asthma, Back pain, Cervical disc disease, Depression, Diabetes mellitus ([facility] [address] Ectopic pregnancy, Fibromyalgia, Fibromyalgia, GERD (gastroesophageal reflux disease), Heart murmur, Herpes genitalia, History of blood transfusion, Hypertension, Lupus ([facility] [address] Migraines, Migraines, Nausea & vomiting, Ovarian cyst, PONV (postoperative nausea and vomiting), Rheumatoid arthritis ([facility] [address] SLE (systemic lupus erythematosus) ([facility] and Thyroid disease.     Past Surgical History:  has a todays results, in addition to providing specific details for the plan of care and counseling regarding the diagnosis and prognosis. Their questions are answered at this time and they are agreeable with the plan.      --------------------------------- ADDITIONAL PROVIDER NOTES ---------------------------------        This patient is stable for discharge. I have shared the specific conditions for return, as well as the importance of follow-up. IMPRESSION:     1. Acute bursitis of right shoulder      Patient's Medications   New Prescriptions    No medications on file   Previous Medications    ALBUTEROL IN    Inhale  into the lungs as needed. AMITRIPTYLINE (ELAVIL) 25 MG TABLET    Take 25 mg by mouth nightly    ATENOLOL (TENORMIN) 25 MG TABLET    Take 25 mg by mouth daily 1/2 tab daily    BUSPIRONE (BUSPAR) 15 MG TABLET    Take 15 mg by mouth 3 times daily    DICLOFENAC SODIUM 1 % GEL    Apply 2 g topically 4 times daily as needed for Pain Using 4 percent    ELASTIC BANDAGES & SUPPORTS (KNEE BRACE) MISC    Hinged knee brace to left knee    LEVOTHYROXINE (SYNTHROID) 25 MCG TABLET    Take 112 mcg by mouth Daily     METFORMIN (GLUCOPHAGE) 500 MG TABLET    Take 500 mg by mouth 2 times daily (with meals)    MILNACIPRAN HCL (SAVELLA PO)    Take by mouth 2 times daily    MONTELUKAST (SINGULAIR) 10 MG TABLET    Take 10 mg by mouth nightly. OMEPRAZOLE (PRILOSEC) 40 MG DELAYED RELEASE CAPSULE    Take 40 mg by mouth daily    ONDANSETRON (ZOFRAN ODT) 4 MG DISINTEGRATING TABLET    Take 1 tablet by mouth every 8 hours as needed for Nausea or Vomiting    OXYCODONE-ACETAMINOPHEN (PERCOCET) 5-325 MG PER TABLET    Take 1 tablet by mouth every 4 hours as needed for Pain .     SIMVASTATIN (ZOCOR) 10 MG TABLET    Take 10 mg by mouth nightly    TIZANIDINE (ZANAFLEX) 4 MG TABLET    Take 4 mg by mouth every 6 hours as needed    TOPIRAMATE (TOPAMAX) 50 MG TABLET    Take 100 mg by mouth 2 times daily    TRAZODONE (DESYREL) 50 MG

## 2019-09-05 ENCOUNTER — HOSPITAL ENCOUNTER (OUTPATIENT)
Dept: ULTRASOUND IMAGING | Age: 39
Discharge: HOME | End: 2019-09-05
Payer: COMMERCIAL

## 2019-09-05 DIAGNOSIS — E11.59 TYPE 2 DIABETES MELLITUS WITH OTHER CIRCULATORY COMPLICATION, UNSPECIFIED WHETHER LONG TERM INSULIN USE (HCC): ICD-10-CM

## 2019-09-05 DIAGNOSIS — N18.30 CKD (CHRONIC KIDNEY DISEASE) STAGE 3, GFR 30-59 ML/MIN (HCC): ICD-10-CM

## 2019-09-05 PROCEDURE — 76775 US EXAM ABDO BACK WALL LIM: CPT

## 2019-09-07 ENCOUNTER — HOSPITAL ENCOUNTER (OUTPATIENT)
Age: 39
Discharge: HOME | End: 2019-09-07
Payer: COMMERCIAL

## 2019-09-07 LAB
ANION GAP SERPL CALCULATED.3IONS-SCNC: 11 MMOL/L (ref 7–16)
BACTERIA: ABNORMAL /HPF
BILIRUBIN URINE: NEGATIVE
BLOOD, URINE: NEGATIVE
BUN BLDV-MCNC: 13 MG/DL (ref 6–20)
CALCIUM SERPL-MCNC: 9.4 MG/DL (ref 8.6–10.2)
CHLORIDE BLD-SCNC: 107 MMOL/L (ref 98–107)
CLARITY: CLEAR
CO2: 21 MMOL/L (ref 22–29)
COLOR: YELLOW
CREAT SERPL-MCNC: 1.2 MG/DL (ref 0.5–1)
CREATININE URINE: 133 MG/DL (ref 29–226)
EPITHELIAL CELLS, UA: ABNORMAL /HPF
GFR AFRICAN AMERICAN: >60
GFR NON-AFRICAN AMERICAN: 50 ML/MIN/1.73
GLUCOSE BLD-MCNC: 103 MG/DL (ref 74–99)
GLUCOSE URINE: NEGATIVE MG/DL
KETONES, URINE: NEGATIVE MG/DL
LEUKOCYTE ESTERASE, URINE: ABNORMAL
MAGNESIUM: 2 MG/DL (ref 1.6–2.6)
MICROALBUMIN UR-MCNC: <12 MG/L
MICROALBUMIN/CREAT UR-RTO: ABNORMAL (ref 0–30)
NITRITE, URINE: NEGATIVE
PARATHYROID HORMONE INTACT: 20 PG/ML (ref 15–65)
PH UA: 6 (ref 5–9)
PHOSPHORUS: 3 MG/DL (ref 2.5–4.5)
POTASSIUM SERPL-SCNC: 4.4 MMOL/L (ref 3.5–5)
PROTEIN UA: NEGATIVE MG/DL
RBC UA: ABNORMAL /HPF (ref 0–2)
SODIUM BLD-SCNC: 139 MMOL/L (ref 132–146)
SPECIFIC GRAVITY UA: 1.01 (ref 1–1.03)
UROBILINOGEN, URINE: 0.2 E.U./DL
WBC UA: ABNORMAL /HPF (ref 0–5)

## 2019-09-07 PROCEDURE — 36415 COLL VENOUS BLD VENIPUNCTURE: CPT

## 2019-09-07 PROCEDURE — 82570 ASSAY OF URINE CREATININE: CPT

## 2019-09-07 PROCEDURE — 82044 UR ALBUMIN SEMIQUANTITATIVE: CPT

## 2019-09-07 PROCEDURE — 83735 ASSAY OF MAGNESIUM: CPT

## 2019-09-07 PROCEDURE — 84100 ASSAY OF PHOSPHORUS: CPT

## 2019-09-07 PROCEDURE — 81001 URINALYSIS AUTO W/SCOPE: CPT

## 2019-09-07 PROCEDURE — 80048 BASIC METABOLIC PNL TOTAL CA: CPT

## 2019-09-07 PROCEDURE — 83970 ASSAY OF PARATHORMONE: CPT

## 2019-09-13 ENCOUNTER — APPOINTMENT (OUTPATIENT)
Dept: GENERAL RADIOLOGY | Age: 39
End: 2019-09-13
Payer: COMMERCIAL

## 2019-09-13 ENCOUNTER — HOSPITAL ENCOUNTER (EMERGENCY)
Age: 39
Discharge: HOME | End: 2019-09-13
Attending: EMERGENCY MEDICINE
Payer: COMMERCIAL

## 2019-09-13 VITALS
HEART RATE: 87 BPM | WEIGHT: 222 LBS | DIASTOLIC BLOOD PRESSURE: 79 MMHG | BODY MASS INDEX: 39.34 KG/M2 | HEIGHT: 63 IN | TEMPERATURE: 97.7 F | RESPIRATION RATE: 16 BRPM | OXYGEN SATURATION: 98 % | SYSTOLIC BLOOD PRESSURE: 100 MMHG

## 2019-09-13 DIAGNOSIS — S93.401A SPRAIN OF RIGHT ANKLE, UNSPECIFIED LIGAMENT, INITIAL ENCOUNTER: Primary | ICD-10-CM

## 2019-09-13 PROCEDURE — G0382 LEV 3 HOSP TYPE B ED VISIT: HCPCS

## 2019-09-13 PROCEDURE — 73610 X-RAY EXAM OF ANKLE: CPT

## 2019-09-13 ASSESSMENT — ENCOUNTER SYMPTOMS
NAUSEA: 0
ABDOMINAL DISTENTION: 0
SHORTNESS OF BREATH: 0
VOMITING: 0
COUGH: 0
DIARRHEA: 0
EYE REDNESS: 0
BACK PAIN: 0
SINUS PRESSURE: 0
EYE DISCHARGE: 0
WHEEZING: 0
EYE PAIN: 0
SORE THROAT: 0

## 2019-09-13 ASSESSMENT — PAIN DESCRIPTION - ORIENTATION: ORIENTATION: RIGHT

## 2019-09-13 ASSESSMENT — PAIN DESCRIPTION - LOCATION: LOCATION: ANKLE

## 2019-09-13 ASSESSMENT — PAIN SCALES - GENERAL: PAINLEVEL_OUTOF10: 6

## 2019-09-13 ASSESSMENT — PAIN DESCRIPTION - PAIN TYPE: TYPE: ACUTE PAIN

## 2019-09-13 ASSESSMENT — PAIN DESCRIPTION - DESCRIPTORS: DESCRIPTORS: SHOOTING;SHARP;THROBBING

## 2019-09-13 NOTE — ED PROVIDER NOTES
right ankle, unspecified ligament, initial encounter        Disposition:  Patient's disposition: Discharge to home  Patient's condition is stable.              Cassius Oscar MD  09/13/19 5527

## 2019-11-08 DIAGNOSIS — M25.512 ACUTE PAIN OF LEFT SHOULDER: Primary | ICD-10-CM

## 2019-11-11 ENCOUNTER — OFFICE VISIT (OUTPATIENT)
Dept: ORTHOPEDIC SURGERY | Age: 39
End: 2019-11-11
Payer: COMMERCIAL

## 2019-11-11 VITALS — TEMPERATURE: 98 F | WEIGHT: 220 LBS | BODY MASS INDEX: 38.98 KG/M2 | HEIGHT: 63 IN

## 2019-11-11 DIAGNOSIS — M75.41 SHOULDER IMPINGEMENT, RIGHT: ICD-10-CM

## 2019-11-11 DIAGNOSIS — M19.011 ARTHRITIS OF RIGHT ACROMIOCLAVICULAR JOINT: Primary | ICD-10-CM

## 2019-11-11 PROCEDURE — 1036F TOBACCO NON-USER: CPT | Performed by: ORTHOPAEDIC SURGERY

## 2019-11-11 PROCEDURE — 99213 OFFICE O/P EST LOW 20 MIN: CPT | Performed by: ORTHOPAEDIC SURGERY

## 2019-11-11 PROCEDURE — G8417 CALC BMI ABV UP PARAM F/U: HCPCS | Performed by: ORTHOPAEDIC SURGERY

## 2019-11-11 PROCEDURE — G8484 FLU IMMUNIZE NO ADMIN: HCPCS | Performed by: ORTHOPAEDIC SURGERY

## 2019-11-11 PROCEDURE — G8427 DOCREV CUR MEDS BY ELIG CLIN: HCPCS | Performed by: ORTHOPAEDIC SURGERY

## 2019-11-11 RX ORDER — DICYCLOMINE HCL 20 MG
TABLET ORAL
Refills: 3 | COMMUNITY
Start: 2019-10-19 | End: 2022-01-27

## 2019-11-11 RX ORDER — ALBUTEROL SULFATE 90 UG/1
1 AEROSOL, METERED RESPIRATORY (INHALATION) EVERY 4 HOURS PRN
Refills: 5 | COMMUNITY
Start: 2019-11-07

## 2019-11-11 RX ORDER — OMEPRAZOLE 20 MG/1
CAPSULE, DELAYED RELEASE ORAL
Refills: 5 | COMMUNITY
Start: 2019-11-07 | End: 2019-11-11

## 2019-11-11 RX ORDER — CHOLECALCIFEROL (VITAMIN D3) 50 MCG
TABLET ORAL
Refills: 0 | COMMUNITY
Start: 2019-11-07 | End: 2019-12-20 | Stop reason: ALTCHOICE

## 2019-11-11 RX ORDER — METHOTREXATE 2.5 MG/1
TABLET ORAL
Refills: 0 | COMMUNITY
Start: 2019-10-19 | End: 2019-11-11 | Stop reason: SINTOL

## 2019-11-11 RX ORDER — LEVOTHYROXINE SODIUM 0.12 MG/1
150 TABLET ORAL DAILY
Refills: 5 | COMMUNITY
Start: 2019-11-07 | End: 2021-02-11 | Stop reason: DRUGHIGH

## 2019-12-09 ENCOUNTER — OFFICE VISIT (OUTPATIENT)
Dept: ORTHOPEDIC SURGERY | Age: 39
End: 2019-12-09
Payer: COMMERCIAL

## 2019-12-09 VITALS — HEIGHT: 63 IN | BODY MASS INDEX: 38.98 KG/M2 | WEIGHT: 220 LBS

## 2019-12-09 DIAGNOSIS — M75.41 SHOULDER IMPINGEMENT, RIGHT: ICD-10-CM

## 2019-12-09 DIAGNOSIS — M19.011 ARTHRITIS OF RIGHT ACROMIOCLAVICULAR JOINT: Primary | ICD-10-CM

## 2019-12-09 PROCEDURE — G8417 CALC BMI ABV UP PARAM F/U: HCPCS | Performed by: ORTHOPAEDIC SURGERY

## 2019-12-09 PROCEDURE — 1036F TOBACCO NON-USER: CPT | Performed by: ORTHOPAEDIC SURGERY

## 2019-12-09 PROCEDURE — G8484 FLU IMMUNIZE NO ADMIN: HCPCS | Performed by: ORTHOPAEDIC SURGERY

## 2019-12-09 PROCEDURE — 99214 OFFICE O/P EST MOD 30 MIN: CPT | Performed by: ORTHOPAEDIC SURGERY

## 2019-12-09 PROCEDURE — G8427 DOCREV CUR MEDS BY ELIG CLIN: HCPCS | Performed by: ORTHOPAEDIC SURGERY

## 2019-12-20 RX ORDER — OXYCODONE HYDROCHLORIDE AND ACETAMINOPHEN 5; 325 MG/1; MG/1
1 TABLET ORAL EVERY 8 HOURS PRN
COMMUNITY

## 2019-12-20 RX ORDER — ACETAMINOPHEN 160 MG
TABLET,DISINTEGRATING ORAL DAILY
COMMUNITY

## 2019-12-26 ENCOUNTER — HOSPITAL ENCOUNTER (OUTPATIENT)
Age: 39
Discharge: HOME | End: 2019-12-26
Payer: COMMERCIAL

## 2019-12-26 DIAGNOSIS — M75.41 SHOULDER IMPINGEMENT, RIGHT: ICD-10-CM

## 2019-12-26 LAB
ANION GAP SERPL CALCULATED.3IONS-SCNC: 14 MMOL/L (ref 7–16)
BUN BLDV-MCNC: 19 MG/DL (ref 6–20)
CALCIUM SERPL-MCNC: 9.4 MG/DL (ref 8.6–10.2)
CHLORIDE BLD-SCNC: 105 MMOL/L (ref 98–107)
CO2: 21 MMOL/L (ref 22–29)
CREAT SERPL-MCNC: 1.2 MG/DL (ref 0.5–1)
GFR AFRICAN AMERICAN: >60
GFR NON-AFRICAN AMERICAN: 50 ML/MIN/1.73
GLUCOSE BLD-MCNC: 107 MG/DL (ref 74–99)
HCT VFR BLD CALC: 41.8 % (ref 34–48)
HEMOGLOBIN: 13.6 G/DL (ref 11.5–15.5)
MCH RBC QN AUTO: 30.8 PG (ref 26–35)
MCHC RBC AUTO-ENTMCNC: 32.5 % (ref 32–34.5)
MCV RBC AUTO: 94.6 FL (ref 80–99.9)
PDW BLD-RTO: 12.5 FL (ref 11.5–15)
PLATELET # BLD: 239 E9/L (ref 130–450)
PMV BLD AUTO: 11.5 FL (ref 7–12)
POTASSIUM SERPL-SCNC: 4.5 MMOL/L (ref 3.5–5)
RBC # BLD: 4.42 E12/L (ref 3.5–5.5)
SODIUM BLD-SCNC: 140 MMOL/L (ref 132–146)
WBC # BLD: 3.5 E9/L (ref 4.5–11.5)

## 2019-12-26 PROCEDURE — 85027 COMPLETE CBC AUTOMATED: CPT

## 2019-12-26 PROCEDURE — 80048 BASIC METABOLIC PNL TOTAL CA: CPT

## 2019-12-26 PROCEDURE — 36415 COLL VENOUS BLD VENIPUNCTURE: CPT

## 2019-12-30 ENCOUNTER — ANESTHESIA EVENT (OUTPATIENT)
Dept: OPERATING ROOM | Age: 39
End: 2019-12-30
Payer: COMMERCIAL

## 2019-12-30 ASSESSMENT — LIFESTYLE VARIABLES: SMOKING_STATUS: 0

## 2019-12-30 NOTE — ANESTHESIA PRE PROCEDURE
100/55   06/17/19 99/69       NPO Status:                                                                                 BMI:   Wt Readings from Last 3 Encounters:   12/09/19 220 lb (99.8 kg)   11/11/19 220 lb (99.8 kg)   09/13/19 222 lb (100.7 kg)     There is no height or weight on file to calculate BMI.    CBC:   Lab Results   Component Value Date    WBC 3.5 12/26/2019    RBC 4.42 12/26/2019    HGB 13.6 12/26/2019    HCT 41.8 12/26/2019    MCV 94.6 12/26/2019    RDW 12.5 12/26/2019     12/26/2019       CMP:   Lab Results   Component Value Date     12/26/2019    K 4.5 12/26/2019     12/26/2019    CO2 21 12/26/2019    BUN 19 12/26/2019    CREATININE 1.2 12/26/2019    GFRAA >60 12/26/2019    LABGLOM 50 12/26/2019    GLUCOSE 107 12/26/2019    PROT 6.8 06/17/2019    CALCIUM 9.4 12/26/2019    BILITOT 0.3 06/17/2019    ALKPHOS 68 06/17/2019    AST 13 06/17/2019    ALT 13 06/17/2019       POC Tests: No results for input(s): POCGLU, POCNA, POCK, POCCL, POCBUN, POCHEMO, POCHCT in the last 72 hours. Coags:   Lab Results   Component Value Date    PROTIME 13.6 07/31/2018    INR 1.2 07/31/2018    APTT 39.9 07/25/2014       HCG (If Applicable):   Lab Results   Component Value Date    PREGTESTUR NEGATIVE 06/12/2019        ABGs: No results found for: PHART, PO2ART, YRV7QBV, UMP9RVZ, BEART, X2WPUOPO     Type & Screen (If Applicable):  No results found for: [facility]    Anesthesia Evaluation  Patient summary reviewed and Nursing notes reviewed   history of anesthetic complications: PONV.   Airway: Mallampati: III  TM distance: >3 FB   Neck ROM: limited  Mouth opening: > = 3 FB Dental:          Pulmonary: breath sounds clear to auscultation  (+) asthma:     (-) not a current smoker (ex smoker)                           Cardiovascular:  Exercise tolerance: good (>4 METS),   (+) hypertension:, valvular problems/murmurs:,     (-) past MI and  angina    ECG reviewed  Rhythm: regular  Rate: normal  Echocardiogram reviewed         Beta Blocker:  Dose within 24 Hrs      ROS comment: EKG = Bradycardia 52, low QRS volt in prec leads    Cleared by PCP--note on chart, reviewed    ECHO:  Left ventricular size is grossly normal.   Normal left ventricular wall thickness. Ejection fraction is visually estimated at 61%. No evidence of left ventricular mass or thrombus noted. No regional wall motion abnormalities seen. Physiologic and/or trace mitral regurgitation is present. No mitral valve stenosis present. Physiologic and/or trace tricuspid regurgitation. Physiologic and/or trace pulmonic regurgitation present. Regular rhythm. Neuro/Psych:   (+) neuromuscular disease:, headaches:, psychiatric history:            GI/Hepatic/Renal:   (+) GERD:, renal disease:, morbid obesity          Endo/Other:    (+) Diabetes, hypothyroidism: arthritis:., .                  ROS comment: SLE Abdominal:   (+) obese,         Vascular: negative vascular ROS. Anesthesia Plan      general     ASA 3     (Glidescope available, Single shot pre Operative Right Inter Scaline Brachial Plexus Nerve Block. inter  Intubated w/out problems last year  Possible IScB  Cleared by PCP    Medical clearance on chart  possible ISCB)  Induction: intravenous. BIS  MIPS: Postoperative opioids intended and Prophylactic antiemetics administered. Anesthetic plan and risks discussed with patient. Plan discussed with CRNA.     Attending anesthesiologist reviewed and agrees with Corie Clements MD   12/30/2019

## 2020-01-03 ENCOUNTER — HOSPITAL ENCOUNTER (OUTPATIENT)
Age: 40
Setting detail: OUTPATIENT SURGERY
Discharge: HOME | End: 2020-01-03
Attending: ORTHOPAEDIC SURGERY | Admitting: ORTHOPAEDIC SURGERY
Payer: COMMERCIAL

## 2020-01-03 ENCOUNTER — ANESTHESIA (OUTPATIENT)
Dept: OPERATING ROOM | Age: 40
End: 2020-01-03
Payer: COMMERCIAL

## 2020-01-03 VITALS
BODY MASS INDEX: 40.04 KG/M2 | SYSTOLIC BLOOD PRESSURE: 110 MMHG | HEIGHT: 63 IN | RESPIRATION RATE: 14 BRPM | OXYGEN SATURATION: 93 % | WEIGHT: 226 LBS | HEART RATE: 77 BPM | DIASTOLIC BLOOD PRESSURE: 60 MMHG | TEMPERATURE: 98.6 F

## 2020-01-03 VITALS
SYSTOLIC BLOOD PRESSURE: 102 MMHG | DIASTOLIC BLOOD PRESSURE: 67 MMHG | RESPIRATION RATE: 19 BRPM | OXYGEN SATURATION: 100 %

## 2020-01-03 PROBLEM — S43.431A TEAR OF RIGHT GLENOID LABRUM: Status: ACTIVE | Noted: 2020-01-03

## 2020-01-03 PROBLEM — M75.41 IMPINGEMENT SYNDROME OF RIGHT SHOULDER: Status: ACTIVE | Noted: 2020-01-03

## 2020-01-03 LAB
HCG, URINE, POC: NEGATIVE
Lab: NORMAL
METER GLUCOSE: 99 MG/DL (ref 74–99)
NEGATIVE QC PASS/FAIL: NORMAL
POSITIVE QC PASS/FAIL: NORMAL

## 2020-01-03 PROCEDURE — 64415 NJX AA&/STRD BRCH PLXS IMG: CPT | Performed by: NURSE ANESTHETIST, CERTIFIED REGISTERED

## 2020-01-03 PROCEDURE — 81025 URINE PREGNANCY TEST: CPT | Performed by: ORTHOPAEDIC SURGERY

## 2020-01-03 PROCEDURE — 7100000000 HC PACU RECOVERY - FIRST 15 MIN: Performed by: ORTHOPAEDIC SURGERY

## 2020-01-03 PROCEDURE — 3600000004 HC SURGERY LEVEL 4 BASE: Performed by: ORTHOPAEDIC SURGERY

## 2020-01-03 PROCEDURE — 3600000014 HC SURGERY LEVEL 4 ADDTL 15MIN: Performed by: ORTHOPAEDIC SURGERY

## 2020-01-03 PROCEDURE — 7100000010 HC PHASE II RECOVERY - FIRST 15 MIN: Performed by: ORTHOPAEDIC SURGERY

## 2020-01-03 PROCEDURE — 6360000002 HC RX W HCPCS: Performed by: NURSE ANESTHETIST, CERTIFIED REGISTERED

## 2020-01-03 PROCEDURE — 2580000003 HC RX 258: Performed by: ANESTHESIOLOGY

## 2020-01-03 PROCEDURE — 7100000001 HC PACU RECOVERY - ADDTL 15 MIN: Performed by: ORTHOPAEDIC SURGERY

## 2020-01-03 PROCEDURE — 7100000011 HC PHASE II RECOVERY - ADDTL 15 MIN: Performed by: ORTHOPAEDIC SURGERY

## 2020-01-03 PROCEDURE — 29823 SHO ARTHRS SRG XTNSV DBRDMT: CPT | Performed by: ORTHOPAEDIC SURGERY

## 2020-01-03 PROCEDURE — 3700000001 HC ADD 15 MINUTES (ANESTHESIA): Performed by: ORTHOPAEDIC SURGERY

## 2020-01-03 PROCEDURE — 3700000000 HC ANESTHESIA ATTENDED CARE: Performed by: ORTHOPAEDIC SURGERY

## 2020-01-03 PROCEDURE — 2580000003 HC RX 258: Performed by: ORTHOPAEDIC SURGERY

## 2020-01-03 PROCEDURE — 82962 GLUCOSE BLOOD TEST: CPT

## 2020-01-03 PROCEDURE — 2709999900 HC NON-CHARGEABLE SUPPLY: Performed by: ORTHOPAEDIC SURGERY

## 2020-01-03 PROCEDURE — 2720000010 HC SURG SUPPLY STERILE: Performed by: ORTHOPAEDIC SURGERY

## 2020-01-03 PROCEDURE — 6360000002 HC RX W HCPCS: Performed by: ORTHOPAEDIC SURGERY

## 2020-01-03 PROCEDURE — 2500000003 HC RX 250 WO HCPCS: Performed by: NURSE ANESTHETIST, CERTIFIED REGISTERED

## 2020-01-03 PROCEDURE — 29826 SHO ARTHRS SRG DECOMPRESSION: CPT | Performed by: ORTHOPAEDIC SURGERY

## 2020-01-03 RX ORDER — FLUOXETINE 10 MG/1
25 TABLET, FILM COATED ORAL 2 TIMES DAILY
COMMUNITY
End: 2021-05-21 | Stop reason: ALTCHOICE

## 2020-01-03 RX ORDER — ROCURONIUM BROMIDE 10 MG/ML
INJECTION, SOLUTION INTRAVENOUS PRN
Status: DISCONTINUED | OUTPATIENT
Start: 2020-01-03 | End: 2020-01-03 | Stop reason: SDUPTHER

## 2020-01-03 RX ORDER — MEPERIDINE HYDROCHLORIDE 50 MG/ML
12.5 INJECTION INTRAMUSCULAR; INTRAVENOUS; SUBCUTANEOUS EVERY 5 MIN PRN
Status: DISCONTINUED | OUTPATIENT
Start: 2020-01-03 | End: 2020-01-03 | Stop reason: HOSPADM

## 2020-01-03 RX ORDER — CEFAZOLIN SODIUM 2 G/50ML
2 SOLUTION INTRAVENOUS ONCE
Status: COMPLETED | OUTPATIENT
Start: 2020-01-03 | End: 2020-01-03

## 2020-01-03 RX ORDER — GLYCOPYRROLATE 1 MG/5 ML
SYRINGE (ML) INTRAVENOUS PRN
Status: DISCONTINUED | OUTPATIENT
Start: 2020-01-03 | End: 2020-01-03 | Stop reason: SDUPTHER

## 2020-01-03 RX ORDER — PROPOFOL 10 MG/ML
INJECTION, EMULSION INTRAVENOUS PRN
Status: DISCONTINUED | OUTPATIENT
Start: 2020-01-03 | End: 2020-01-03 | Stop reason: SDUPTHER

## 2020-01-03 RX ORDER — MIDAZOLAM HYDROCHLORIDE 1 MG/ML
INJECTION INTRAMUSCULAR; INTRAVENOUS PRN
Status: DISCONTINUED | OUTPATIENT
Start: 2020-01-03 | End: 2020-01-03 | Stop reason: SDUPTHER

## 2020-01-03 RX ORDER — LIDOCAINE HYDROCHLORIDE 20 MG/ML
INJECTION, SOLUTION INTRAVENOUS PRN
Status: DISCONTINUED | OUTPATIENT
Start: 2020-01-03 | End: 2020-01-03 | Stop reason: SDUPTHER

## 2020-01-03 RX ORDER — SODIUM CHLORIDE, SODIUM LACTATE, POTASSIUM CHLORIDE, CALCIUM CHLORIDE 600; 310; 30; 20 MG/100ML; MG/100ML; MG/100ML; MG/100ML
INJECTION, SOLUTION INTRAVENOUS CONTINUOUS
Status: DISCONTINUED | OUTPATIENT
Start: 2020-01-03 | End: 2020-01-03 | Stop reason: HOSPADM

## 2020-01-03 RX ORDER — KETOROLAC TROMETHAMINE 30 MG/ML
INJECTION, SOLUTION INTRAMUSCULAR; INTRAVENOUS PRN
Status: DISCONTINUED | OUTPATIENT
Start: 2020-01-03 | End: 2020-01-03 | Stop reason: SDUPTHER

## 2020-01-03 RX ORDER — FENTANYL CITRATE 50 UG/ML
INJECTION, SOLUTION INTRAMUSCULAR; INTRAVENOUS PRN
Status: DISCONTINUED | OUTPATIENT
Start: 2020-01-03 | End: 2020-01-03 | Stop reason: SDUPTHER

## 2020-01-03 RX ORDER — HYDROMORPHONE HYDROCHLORIDE 1 MG/ML
0.5 INJECTION, SOLUTION INTRAMUSCULAR; INTRAVENOUS; SUBCUTANEOUS EVERY 5 MIN PRN
Status: DISCONTINUED | OUTPATIENT
Start: 2020-01-03 | End: 2020-01-03 | Stop reason: HOSPADM

## 2020-01-03 RX ADMIN — PROPOFOL 200 MG: 10 INJECTION, EMULSION INTRAVENOUS at 09:12

## 2020-01-03 RX ADMIN — KETOROLAC TROMETHAMINE 30 MG: 30 INJECTION, SOLUTION INTRAMUSCULAR; INTRAVENOUS at 10:21

## 2020-01-03 RX ADMIN — LIDOCAINE HYDROCHLORIDE 50 MG: 20 INJECTION, SOLUTION INTRAVENOUS at 09:12

## 2020-01-03 RX ADMIN — Medication 40 MG: at 09:12

## 2020-01-03 RX ADMIN — MIDAZOLAM 2 MG: 1 INJECTION INTRAMUSCULAR; INTRAVENOUS at 09:11

## 2020-01-03 RX ADMIN — SODIUM CHLORIDE, POTASSIUM CHLORIDE, SODIUM LACTATE AND CALCIUM CHLORIDE: 600; 310; 30; 20 INJECTION, SOLUTION INTRAVENOUS at 10:00

## 2020-01-03 RX ADMIN — CEFAZOLIN SODIUM 2 G: 2 SOLUTION INTRAVENOUS at 08:58

## 2020-01-03 RX ADMIN — Medication 0.1 MG: at 09:12

## 2020-01-03 RX ADMIN — SODIUM CHLORIDE, POTASSIUM CHLORIDE, SODIUM LACTATE AND CALCIUM CHLORIDE: 600; 310; 30; 20 INJECTION, SOLUTION INTRAVENOUS at 08:16

## 2020-01-03 RX ADMIN — FENTANYL CITRATE 50 MCG: 50 INJECTION, SOLUTION INTRAMUSCULAR; INTRAVENOUS at 09:20

## 2020-01-03 RX ADMIN — FENTANYL CITRATE 100 MCG: 50 INJECTION, SOLUTION INTRAMUSCULAR; INTRAVENOUS at 09:11

## 2020-01-03 ASSESSMENT — PULMONARY FUNCTION TESTS
PIF_VALUE: 18
PIF_VALUE: 19
PIF_VALUE: 25
PIF_VALUE: 6
PIF_VALUE: 9
PIF_VALUE: 17
PIF_VALUE: 19
PIF_VALUE: 1
PIF_VALUE: 16
PIF_VALUE: 17
PIF_VALUE: 17
PIF_VALUE: 19
PIF_VALUE: 19
PIF_VALUE: 18
PIF_VALUE: 11
PIF_VALUE: 19
PIF_VALUE: 14
PIF_VALUE: 0
PIF_VALUE: 20
PIF_VALUE: 19
PIF_VALUE: 19
PIF_VALUE: 17
PIF_VALUE: 19
PIF_VALUE: 19
PIF_VALUE: 18
PIF_VALUE: 17
PIF_VALUE: 16
PIF_VALUE: 18
PIF_VALUE: 0
PIF_VALUE: 17
PIF_VALUE: 18
PIF_VALUE: 19
PIF_VALUE: 18
PIF_VALUE: 21
PIF_VALUE: 1
PIF_VALUE: 1
PIF_VALUE: 15
PIF_VALUE: 20
PIF_VALUE: 19
PIF_VALUE: 19
PIF_VALUE: 11
PIF_VALUE: 32
PIF_VALUE: 0
PIF_VALUE: 22
PIF_VALUE: 19
PIF_VALUE: 15
PIF_VALUE: 18
PIF_VALUE: 21
PIF_VALUE: 19
PIF_VALUE: 17
PIF_VALUE: 0
PIF_VALUE: 0
PIF_VALUE: 18
PIF_VALUE: 19
PIF_VALUE: 21
PIF_VALUE: 21
PIF_VALUE: 1
PIF_VALUE: 16
PIF_VALUE: 10
PIF_VALUE: 22
PIF_VALUE: 17
PIF_VALUE: 19
PIF_VALUE: 9
PIF_VALUE: 19
PIF_VALUE: 20
PIF_VALUE: 1
PIF_VALUE: 19
PIF_VALUE: 17
PIF_VALUE: 0
PIF_VALUE: 19
PIF_VALUE: 20
PIF_VALUE: 1
PIF_VALUE: 19
PIF_VALUE: 20
PIF_VALUE: 20
PIF_VALUE: 16
PIF_VALUE: 1
PIF_VALUE: 15
PIF_VALUE: 18
PIF_VALUE: 29
PIF_VALUE: 17
PIF_VALUE: 18
PIF_VALUE: 0
PIF_VALUE: 17
PIF_VALUE: 19
PIF_VALUE: 7
PIF_VALUE: 19
PIF_VALUE: 0
PIF_VALUE: 2

## 2020-01-03 ASSESSMENT — PAIN SCALES - GENERAL
PAINLEVEL_OUTOF10: 0

## 2020-01-03 ASSESSMENT — PAIN - FUNCTIONAL ASSESSMENT: PAIN_FUNCTIONAL_ASSESSMENT: 0-10

## 2020-01-03 ASSESSMENT — PAIN DESCRIPTION - DESCRIPTORS: DESCRIPTORS: ACHING;DISCOMFORT

## 2020-01-03 NOTE — ANESTHESIA PROCEDURE NOTES
Peripheral Block    Patient location during procedure: pre-op  Start time: 1/3/2020 9:03 AM  End time: 1/3/2020 9:08 AM  Staffing  Anesthesiologist: Farnaz Zhou MD  Performed: anesthesiologist   Preanesthetic Checklist  Completed: patient identified, site marked, surgical consent, pre-op evaluation, timeout performed, IV checked, risks and benefits discussed, monitors and equipment checked, anesthesia consent given, oxygen available and patient being monitored  Peripheral Block  Patient position: sitting  Prep: ChloraPrep  Patient monitoring: cardiac monitor, continuous pulse ox, frequent blood pressure checks and IV access  Block type: Brachial plexus  Laterality: right  Injection technique: single-shot  Procedures: ultrasound guided  Local infiltration: ropivacaine  Infiltration strength: 0.5 %  Dose: 40 mL  Interscalene  Provider prep: mask and sterile gloves  Local infiltration: ropivacaine  Needle  Needle type: combined needle/nerve stimulator   Needle gauge: 22 G  Needle length: 10 cm (2.5)  Needle localization: anatomical landmarks and nerve stimulator  Needle insertion depth: 2 cm  Assessment  Injection assessment: negative aspiration for heme, no paresthesia on injection and local visualized surrounding nerve on ultrasound  Paresthesia pain: none  Slow fractionated injection: yes  Hemodynamics: stable  Additional Notes  Versed 2 mgm IV + Fentanyl 100 microgram IV. Propofol 20 mg IV. Right neck prepared, Cricoid cartilage identified a line drawn to the posterior head of the scm. Area sterilized with Chlorprep. 1 inch needle inserted until a twitch a 0.28. Injected 40 cc of Ropivacaine, 0.5%. No complications.               Reason for block: post-op pain management and at surgeon's request

## 2020-01-03 NOTE — H&P
Updated H&P    Chief Complaint   Patient presents with    Shoulder Pain       Right shoulder follow up.          Roderick Orozco is a 44y.o. year old   female who is seen today  for evaluation of right shoulder pain. She reports the pain has been ongoing for the past 2 months. She does not recall a specific injury which started the pain. She reports the pain is worse with activity, better with rest.  The patient does have mechanical symptoms. Shedoes have night pain. She denies a feeling of instability. The prior treatments have been none. The patient   has not responded to the treatment. The patient is right hand dominant. The patient is not working.  The patient is disabled.              Chief Complaint   Patient presents with    Shoulder Pain       Right shoulder follow up.       Past Medical History        Past Medical History:   Diagnosis Date    Asthma      Back pain      Cervical disc disease      Depression      Diabetes mellitus ([address]      Ectopic pregnancy      Fibromyalgia      Fibromyalgia      GERD (gastroesophageal reflux disease)      Heart murmur       benign    Herpes genitalia      History of blood transfusion 2003    Hypertension      Lupus ([address]      Migraines      Migraines      Nausea & vomiting      Ovarian cyst      PONV (postoperative nausea and vomiting)       with anesthesia    Rheumatoid arthritis (HCC)      SLE (systemic lupus erythematosus) (HCC)      Thyroid disease           Past Surgical History         Past Surgical History:   Procedure Laterality Date    CERVICAL FUSION   07/30/2014     anterior cervical discetomy fusion C6-C7    CERVICAL SPINE SURGERY         C6-C7 fused    DILATION AND CURETTAGE OF UTERUS        ECHO COMPL W DOP COLOR FLOW   8/24/2013          KNEE ARTHROSCOPY Right 2014    KNEE ARTHROSCOPY Right 04 01 2016     Arthroscopy right knee chondroplasty synovectomy medial and lateral menisectomy    KNEE ARTHROSCOPY Left 12/16/2016     left knee arthoscopy medial meniscectomy chodroplasty synovectomy    NERVE BLOCK Bilateral 12/06/2016     lumbar facet #1    OTHER SURGICAL HISTORY         partial rt  fallopian tube removed    CO SHOULDER SCOPE BONE SHAVING Left 4/12/2018     LEFT SHOULDER ARTHROSCOPY, SUBACROMIAL DECOMPRESSION AND DEBRIDEMENT performed by Sundeep Trevino DO at [address] Left 04/12/2018    TONSILLECTOMY        TUBAL LIGATION               Current Medication      Current Outpatient Medications:     albuterol sulfate  (90 Base) MCG/ACT inhaler, , Disp: , Rfl: 5    Cholecalciferol (VITAMIN D) 50 MCG (2000 UT) TABS tablet, , Disp: , Rfl: 0    dicyclomine (BENTYL) 20 MG tablet, 1 TAB BY MOUTH TWICE A DAY 30 MIN BEFORE MEALS, Disp: , Rfl: 3    levothyroxine (SYNTHROID) 125 MCG tablet, , Disp: , Rfl: 5    Milnacipran HCl (SAVELLA PO), Take by mouth 2 times daily, Disp: , Rfl:     simvastatin (ZOCOR) 10 MG tablet, Take 10 mg by mouth nightly, Disp: , Rfl:     busPIRone (BUSPAR) 15 MG tablet, Take 15 mg by mouth 3 times daily, Disp: , Rfl:     diclofenac sodium 1 % GEL, Apply 2 g topically 4 times daily as needed for Pain Using 4 percent, Disp: , Rfl:     tiZANidine (ZANAFLEX) 4 MG tablet, Take 4 mg by mouth every 6 hours as needed, Disp: , Rfl:     amitriptyline (ELAVIL) 25 MG tablet, Take 25 mg by mouth nightly, Disp: , Rfl:     Elastic Bandages & Supports (KNEE BRACE) MISC, Hinged knee brace to left knee, Disp: 1 each, Rfl: 0    omeprazole (PRILOSEC) 40 MG delayed release capsule, Take 40 mg by mouth daily, Disp: , Rfl:     vitamin D (ERGOCALCIFEROL) 57717 UNITS CAPS capsule, Take 50,000 Units by mouth once a week, Disp: , Rfl:     topiramate (TOPAMAX) 50 MG tablet, Take 100 mg by mouth 2 times daily, Disp: , Rfl:     metFORMIN (GLUCOPHAGE) 500 MG tablet, Take 500 mg by mouth 2 times daily (with meals), Disp: , Rfl:     atenolol (TENORMIN) 25 MG tablet, Take 25 mg by mouth daily 1/2 tab daily, Disp: , Rfl:     traZODone (DESYREL) 50 MG tablet, Take 50 mg by mouth nightly as needed , Disp: , Rfl:     montelukast (SINGULAIR) 10 MG tablet, Take 10 mg by mouth nightly., Disp: , Rfl:     ALBUTEROL IN, Inhale  into the lungs as needed. , Disp: , Rfl:     valACYclovir (VALTREX) 500 MG tablet, Take 500 mg by mouth as needed , Disp: , Rfl:            Allergies   Allergen Reactions    Acetaminophen Other (See Comments)    Aspirin Nausea Only and Other (See Comments)       Heart rate goes up    Gabapentin         Dizziness    Mobic [Meloxicam] Other (See Comments)       \"Almost a stroke, couldn't talk, right side numbness\"    Propoxyphene         hives    Tape [Adhesive Tape] Other (See Comments)       \"skin spots\"    Theophylline Other (See Comments)    Cortisone Hives, Nausea And Vomiting and Other (See Comments)       All steroids; heart rate goes up, massive migraines        Darvocet A500 [Propoxyphene N-Acetaminophen] Hives, Nausea And Vomiting and Other (See Comments)       Massive migraines      Social History               Socioeconomic History    Marital status:        Spouse name: Not on file    Number of children: Not on file    Years of education: Not on file    Highest education level: Not on file   Occupational History    Not on file   Social Needs    Financial resource strain: Not on file    Food insecurity:       Worry: Not on file       Inability: Not on file    Transportation needs:       Medical: Not on file       Non-medical: Not on file   Tobacco Use    Smoking status: Former Smoker       Years: 1.00       Types: Cigarettes       Last attempt to quit: 1998       Years since quittin.4    Smokeless tobacco: Never Used   Substance and Sexual Activity    Alcohol use: No       Comment: Socially.   Coffee rarely     Drug use: No    Sexual activity: Not on file   Lifestyle    Physical activity:       Days per week: Not on file     Minutes per session: Not on file    Stress: Not on file   Relationships    Social connections:       Talks on phone: Not on file       Gets together: Not on file       Attends Anabaptism service: Not on file       Active member of club or organization: Not on file       Attends meetings of clubs or organizations: Not on file       Relationship status: Not on file    Intimate partner violence:       Fear of current or ex partner: Not on file       Emotionally abused: Not on file       Physically abused: Not on file       Forced sexual activity: Not on file   Other Topics Concern    Not on file   Social History Narrative    Not on file         Family History         Family History   Problem Relation Age of Onset    Diabetes Mother      Migraines Mother      Hypertension Mother      Ovarian Cancer Sister      Mental Illness Other      Arthritis Other      Hypertension Other      No Known Problems Father              REVIEW OF SYSTEMS:      General/Constitution:  (-)weight loss, (-)fever, (-)chills, (-)weakness. Skin: (-) rash,(-) psoriasis,(-) eczema, (-)skin cancer. Musculoskeletal: (-) fractures,  (-) dislocations,(-) collagen vascular disease, (-) fibromyalgia, (-) multiple sclerosis, (-) muscular dystrophy, (-) RSD,(-) joint pain (-)swelling, (-) joint pain,swelling. Neurologic: (-) epilepsy, (-)seizures,(-) brain tumor,(-) TIA, (-)stroke, (-)headaches, (-)Parkinson disease,(-) memory loss, (-) LOC. Cardiovascular: (-) Chest pain, (-) swelling in legs/feet, (-) SOB, (-) cramping in legs/feet with walking. Respiratory: (-) SOB, (-) Coughing, (-) night sweats. GI: (-) nausea, (-) vomiting, (-) diarrhea, (-) blood in stool, (-) gastric ulcer. Psychiatric: (-) Depression, (-) Anxiety, (-) bipolar disease, (-) Alzheimer's Disease  Allergic/Immunologic: (-) allergies latex, (-) allergies metal, (-) skin sensitivity.   Hematlogic: (-) anemia, (-) blood transfusion, (-) DVT/PE, (-) Clotting disorders        Subjective:     Constitution:  BP (!) 137/95   Pulse 105   Temp 98 °F (36.7 °C)   Resp 14   Ht 5' 3\" (1.6 m)   Wt 226 lb (102.5 kg)   LMP 11/22/2019   SpO2 99%   BMI 40.03 kg/m²            Vital signs are stable.  In general, patient is awake, alert and oriented X3, in no apparent distress.  Examination of HENT reveals normocephalic, atraumatic.  PERRLA/EOMI sclera are white.  Conjunctivae are clear.  TM's are intact.  Pharynx is pink and moist.  Uvula and tongue are midline.  Heart: Positive S1 and positive S2 with regular rate and rhythm.  Lungs: Clear to auscultation bilaterally without rales, rhonchi or wheezes.  Abdomen: soft, nontender.  Positive bowel sounds.  No organomegaly.  No guarding or rigidity.        Psycihatric:  The patient is alert and oriented x 3, appears to be stated age and in no distress.       Respiratory:  Respiratory effort is not labored. Patient is not gasping. Palpation of the chest reveals no tactile fremitus.     Skin:  Upon inspection: the skin appears warm, dry and intact. There is not a previous scar over the affected area. There is not any cellulitis, lymphedema or cutaneous lesions noted in the lower extremities. Upon palpation there is no induration noted.       Neurologic:  Motor exam of the upper extremities show: The reflexes in biceps/triceps/brachioradialis are equal and symmetric. Sensory exam C5-T1 are normal bilaterally.         Cardiovascular: The vascular exam is normal and is well perfused to distal extremities. There are 2+ radial pulses bilaterally, and motor and sensation is intact to median, ulnar, and radial, musclocutaneus, and axillary nerve distribution and grossly symmetric bilaterally. There is cap refill noted less than two seconds in all digits. There is not edema of the bilateral upper extremities.   There is not varicosities noted in the distal extremities.       Lymph:  Upon palpation,  there is no lymphadenopathy noted in

## 2020-01-17 ENCOUNTER — OFFICE VISIT (OUTPATIENT)
Dept: ORTHOPEDIC SURGERY | Age: 40
End: 2020-01-17

## 2020-01-17 VITALS — BODY MASS INDEX: 40.04 KG/M2 | HEIGHT: 63 IN | WEIGHT: 226 LBS | TEMPERATURE: 98 F

## 2020-01-17 PROCEDURE — 99024 POSTOP FOLLOW-UP VISIT: CPT | Performed by: NURSE PRACTITIONER

## 2020-01-17 RX ORDER — OMEPRAZOLE 20 MG/1
CAPSULE, DELAYED RELEASE ORAL
Refills: 5 | COMMUNITY
Start: 2019-12-03 | End: 2020-01-17

## 2020-01-17 RX ORDER — IBUPROFEN 800 MG/1
TABLET ORAL
COMMUNITY
End: 2020-06-17

## 2020-01-17 NOTE — PATIENT INSTRUCTIONS
(extend): Stand with your back flat against a wall. Your upper arm should be against the wall, with your elbow bent 90 degrees (your hand straight ahead). Push your elbow gently back against the wall with about 25% to 50% of your strength. Don't let your body move forward as you push. Hold for about 6 seconds. Relax for a few seconds. Repeat 8 to 12 times. Scapular exercise: Wall push-ups   1. Stand facing a wall, about 12 inches to 18 inches away. 2. Place your hands on the wall at shoulder height. 3. Slowly bend your elbows and bring your face to the wall. Keep your back and hips straight. 4. Push back to where you started. 5. Repeat 8 to 12 times. 6. When you can do this exercise against a wall comfortably, you can try it against a counter. You can then slowly progress to the end of a couch, then to a sturdy chair, and finally to the floor. Scapular exercise: Retraction   1. Put the band around a solid object at about waist level. (A bedpost will work well.) Each hand should hold an end of the band. 2. With your elbows at your sides and bent to 90 degrees, pull the band back. Your shoulder blades should move toward each other. Then move your arms back where you started. 3. Repeat 8 to 12 times. 4. If you have good range of motion in your shoulders, try this exercise with your arms lifted out to the sides. Keep your elbows at a 90-degree angle. Raise the elastic band up to about shoulder level. Pull the band back to move your shoulder blades toward each other. Then move your arms back where you started. Internal rotator strengthening exercise   1. Start by tying a piece of elastic exercise material to a doorknob. You can use surgical tubing or Thera-Band. 2. Stand or sit with your shoulder relaxed and your elbow bent 90 degrees. Your upper arm should rest comfortably against your side. Squeeze a rolled towel between your elbow and your body for comfort.  This will help keep your arm at your side.  3. Hold one end of the elastic band in the hand of the painful arm. 4. Slowly rotate your forearm toward your body until it touches your belly. Slowly move it back to where you started. 5. Keep your elbow and upper arm firmly tucked against the towel roll or at your side. 6. Repeat 8 to 12 times. External rotator strengthening exercise   1. Start by tying a piece of elastic exercise material to a doorknob. You can use surgical tubing or Thera-Band. (You may also hold one end of the band in each hand.)  2. Stand or sit with your shoulder relaxed and your elbow bent 90 degrees. Your upper arm should rest comfortably against your side. Squeeze a rolled towel between your elbow and your body for comfort. This will help keep your arm at your side. 3. Hold one end of the elastic band with the hand of the painful arm. 4. Start with your forearm across your belly. Slowly rotate the forearm out away from your body. Keep your elbow and upper arm tucked against the towel roll or the side of your body until you begin to feel tightness in your shoulder. Slowly move your arm back to where you started. 5. Repeat 8 to 12 times. Follow-up care is a key part of your treatment and safety. Be sure to make and go to all appointments, and call your doctor if you are having problems. It's also a good idea to know your test results and keep a list of the medicines you take. Where can you learn more? Go to https://[employer]. org and sign in to your [employer] account. Enter Jay Jay Solano in the [facility] box to learn more about \"Rotator Cuff: Exercises. \"     If you do not have an account, please click on the \"Sign Up Now\" link. Current as of: June 26, 2019  Content Version: 12.3  © 5020-3337 [employer]. Care instructions adapted under license by [facility] ([facility]).  If you have questions about a medical condition or this instruction, always ask your healthcare professional. Sonu Barraza,

## 2020-01-17 NOTE — PROGRESS NOTES
Roderick Orozco is here for follow-up after right shoulder arthroscopy. Findings at surgery:  Partial rtc tear, impingement, . Pain is controlled with current analgesics. Medication(s) being used: percocet. The patient denies fever, wound drainage, increasing redness, pus, increasing pain, increasing swelling. Post op problems reported: none. She is ambulating normally. Shoulder exam -   The incisions are clean, dry and intact. right 110/30/pl range of motion   no pain on motion, no tenderness or deformity noted. Motor and sensory exam is grossly intact in B/L upper extremities. Special test results are as follow:  Impingement negative, Olmedo negative, Speeds negative, Apprehension negative, Barclay negative, Load Shiftnegative, Angelina manuver negative, Cross arm test negative. Encounter Diagnosis   Name Primary?  Shoulder impingement, right Yes       Plan:    The patient will continue with gentle ROM exercises and being activities as tolerated. The patient is not being referred to physical therapy. Sling will be used for comfort ONLY. Patient is to continue analgesics and needed and use ice for pain.     We will see the pain back prn

## 2020-02-17 ENCOUNTER — HOSPITAL ENCOUNTER (EMERGENCY)
Age: 40
Discharge: HOME | End: 2020-02-17
Payer: COMMERCIAL

## 2020-02-17 VITALS
RESPIRATION RATE: 14 BRPM | HEIGHT: 63 IN | TEMPERATURE: 98 F | OXYGEN SATURATION: 99 % | WEIGHT: 229.25 LBS | BODY MASS INDEX: 40.62 KG/M2 | HEART RATE: 84 BPM

## 2020-02-17 PROCEDURE — 96372 THER/PROPH/DIAG INJ SC/IM: CPT

## 2020-02-17 PROCEDURE — 6360000002 HC RX W HCPCS: Performed by: PHYSICIAN ASSISTANT

## 2020-02-17 PROCEDURE — 99283 EMERGENCY DEPT VISIT LOW MDM: CPT

## 2020-02-17 RX ORDER — MORPHINE SULFATE 10 MG/ML
6 INJECTION, SOLUTION INTRAMUSCULAR; INTRAVENOUS ONCE
Status: COMPLETED | OUTPATIENT
Start: 2020-02-17 | End: 2020-02-17

## 2020-02-17 RX ORDER — DIAZEPAM 5 MG/ML
10 INJECTION, SOLUTION INTRAMUSCULAR; INTRAVENOUS ONCE
Status: COMPLETED | OUTPATIENT
Start: 2020-02-17 | End: 2020-02-17

## 2020-02-17 RX ORDER — AMOXICILLIN 500 MG/1
500 CAPSULE ORAL 3 TIMES DAILY
Qty: 30 CAPSULE | Refills: 0 | Status: SHIPPED | OUTPATIENT
Start: 2020-02-17 | End: 2020-02-27

## 2020-02-17 RX ADMIN — DIAZEPAM 10 MG: 5 INJECTION, SOLUTION INTRAMUSCULAR; INTRAVENOUS at 15:55

## 2020-02-17 RX ADMIN — MORPHINE SULFATE 6 MG: 10 INJECTION INTRAVENOUS at 15:54

## 2020-02-17 ASSESSMENT — PAIN - FUNCTIONAL ASSESSMENT: PAIN_FUNCTIONAL_ASSESSMENT: PREVENTS OR INTERFERES SOME ACTIVE ACTIVITIES AND ADLS

## 2020-02-17 ASSESSMENT — PAIN SCALES - GENERAL
PAINLEVEL_OUTOF10: 5
PAINLEVEL_OUTOF10: 8

## 2020-02-17 ASSESSMENT — PAIN DESCRIPTION - ORIENTATION: ORIENTATION: RIGHT

## 2020-02-17 ASSESSMENT — PAIN DESCRIPTION - FREQUENCY
FREQUENCY: CONTINUOUS
FREQUENCY: CONTINUOUS

## 2020-02-17 ASSESSMENT — PAIN DESCRIPTION - DESCRIPTORS: DESCRIPTORS: ACHING

## 2020-02-17 ASSESSMENT — PAIN DESCRIPTION - PAIN TYPE
TYPE: ACUTE PAIN
TYPE: CHRONIC PAIN;ACUTE PAIN

## 2020-02-17 ASSESSMENT — PAIN DESCRIPTION - LOCATION
LOCATION: HEAD;BACK;HIP
LOCATION: SHOULDER

## 2020-02-17 ASSESSMENT — PAIN DESCRIPTION - ONSET: ONSET: ON-GOING

## 2020-02-17 ASSESSMENT — PAIN DESCRIPTION - PROGRESSION: CLINICAL_PROGRESSION: RAPIDLY IMPROVING

## 2020-02-17 NOTE — ED PROVIDER NOTES
Ectopic pregnancy, Fibromyalgia, GERD (gastroesophageal reflux disease), Heart murmur, Herpes genitalia, History of blood transfusion, Hyperlipidemia, Hypertension, Migraines, Ovarian cyst, PONV (postoperative nausea and vomiting), Renal insufficiency, and Thyroid disease. Past Surgical History:  has a past surgical history that includes Dilation and curettage of uterus; Tubal ligation; Tonsillectomy; ECHO Compl W Dop Color Flow (8/24/2013); other surgical history; cervical fusion (07/30/2014); Knee arthroscopy (Right, 2014); Knee arthroscopy (Right, 04 01 2016); Nerve Block (Bilateral, 12/06/2016); Knee arthroscopy (Left, 12/16/2016); shoulder surgery (Left, 04/12/2018); pr shoulder scope bone shaving (Left, 4/12/2018); Cervical spine surgery; Shoulder arthroscopy (Right, 01/03/2020); and Shoulder arthroscopy (Right, 1/3/2020). Social History:  reports that she has quit smoking. She has never used smokeless tobacco. She reports that she does not drink alcohol or use drugs. Family History: family history includes Arthritis in an other family member; Diabetes in her mother; Hypertension in her mother and another family member; Mental Illness in an other family member; Migraines in her mother; No Known Problems in her father; Ovarian Cancer in her sister. The patients home medications have been reviewed. Allergies: Acetaminophen; Aspirin; Folic acid; Gabapentin; Mobic [meloxicam]; Propoxyphene; Tape [adhesive tape]; Theophylline; Cortisone; and Darvocet a500 [propoxyphene n-acetaminophen]    --------------------------------- RESULTS ------------------------------------------  All laboratory and radiology results have been personally reviewed by myself   LABS:  No results found for this visit on 02/17/20.     RADIOLOGY:  Interpreted by Radiologist.  No orders to display       ----------------- NURSING NOTES AND VITALS REVIEWED ---------------   The nursing notes within the ED encounter and vital signs This certainly is not a classic migraine. She has had sinus symptoms for 2 weeks now. Counseling: The emergency provider has spoken with the patient and discussed todays results, in addition to providing specific details for the plan of care and counseling regarding the diagnosis and prognosis. Questions are answered at this time and they are agreeable with the plan.      ------------------------ IMPRESSION AND DISPOSITION -------------------------------    IMPRESSION  1. Acute nonintractable headache, unspecified headache type    2.  Spasm of muscle        DISPOSITION  Disposition: Discharge to home  Patient condition is stable                   Tyler Padilla PA-C  02/17/20 [id number]

## 2020-02-17 NOTE — ED NOTES
Patient had recent right shoulder OR continues to have pain, denies injury. Stated is to start therapy soon and is looking forward to it. Complains of multi areas of discomfort that are chronic in state. Again, denies any new injury. Otherwise states she has been healthy and things unchanged with health. Age appropriate, assessment WNL. Rates her discomfort #8/10.       Dalia Wu RN  02/17/20 [id number]

## 2020-06-16 ENCOUNTER — OFFICE VISIT (OUTPATIENT)
Dept: ORTHOPEDIC SURGERY | Age: 40
End: 2020-06-16
Payer: COMMERCIAL

## 2020-06-16 VITALS — HEIGHT: 63 IN | WEIGHT: 230 LBS | BODY MASS INDEX: 40.75 KG/M2

## 2020-06-16 PROCEDURE — 1036F TOBACCO NON-USER: CPT | Performed by: ORTHOPAEDIC SURGERY

## 2020-06-16 PROCEDURE — G8428 CUR MEDS NOT DOCUMENT: HCPCS | Performed by: ORTHOPAEDIC SURGERY

## 2020-06-16 PROCEDURE — G8417 CALC BMI ABV UP PARAM F/U: HCPCS | Performed by: ORTHOPAEDIC SURGERY

## 2020-06-16 PROCEDURE — 99214 OFFICE O/P EST MOD 30 MIN: CPT | Performed by: ORTHOPAEDIC SURGERY

## 2020-06-16 NOTE — PROGRESS NOTES
Physically abused: Not on file     Forced sexual activity: Not on file   Other Topics Concern    Not on file   Social History Narrative    Not on file     Family History   Problem Relation Age of Onset    Diabetes Mother    Lance Coad Migraines Mother     Hypertension Mother     Ovarian Cancer Sister     Mental Illness Other     Arthritis Other     Hypertension Other     No Known Problems Father        Review of Systems:     Skin: (-) rash,(-) psoriasis,(-) eczema, (-)skin cancer. Musculoskeletal: (-) fractures,  (-) dislocations,(-) collagen vascular disease, (-) fibromyalgia, (-) multiple sclerosis, (-) muscular dystrophy, (-) RSD,(-) joint pain (-)swelling, (-) joint pain,swelling. Neurologic: (-) epilepsy, (-)seizures,(-) brain tumor,(-) TIA, (-)stroke, (-)headaches, (-)Parkinson disease,(-) memory loss, (-) LOC. Cardiovascular: (-) Chest pain, (-) swelling in legs/feet, (-) SOB, (-) cramping in legs/feet with walking. _Ht 5' 3\" (1.6 m)   Wt 230 lb (104.3 kg)   BMI 40.74 kg/m²  Vital signs are stable. In general, patient is awake, alert and oriented X3, in no apparent distress. Examination of HENT reveals normocephalic, atraumatic. PERRLA/EOMI sclera are white. Conjunctivae are clear. TM's are intact. Pharynx is pink and moist.  Uvula and tongue are midline. Heart: Positive S1 and positive S2 with regular rate and rhythm. Lungs: Clear to auscultation bilaterally without rales, rhonchi or wheezes. Abdomen: soft, nontender. Positive bowel sounds. No organomegaly. No guarding or rigidity. Constitutional:  The patient is alert and oriented x 3, appears to be stated age and in no distress. Ht 5' 3\" (1.6 m)   Wt 230 lb (104.3 kg)   BMI 40.74 kg/m²     Skin:  Upon inspection: the skin appears warm, dry and intact. There is not a previous scar over the affected area. There is not any cellulitis, lymphedema or cutaneous lesions noted in the lower extremities.    Upon palpation there is no

## 2020-06-17 ENCOUNTER — HOSPITAL ENCOUNTER (EMERGENCY)
Age: 40
Discharge: HOME | End: 2020-06-17
Attending: EMERGENCY MEDICINE
Payer: COMMERCIAL

## 2020-06-17 VITALS
DIASTOLIC BLOOD PRESSURE: 97 MMHG | SYSTOLIC BLOOD PRESSURE: 128 MMHG | HEART RATE: 97 BPM | OXYGEN SATURATION: 99 % | WEIGHT: 235 LBS | HEIGHT: 63 IN | TEMPERATURE: 98.6 F | RESPIRATION RATE: 16 BRPM | BODY MASS INDEX: 41.64 KG/M2

## 2020-06-17 PROCEDURE — 6370000000 HC RX 637 (ALT 250 FOR IP): Performed by: EMERGENCY MEDICINE

## 2020-06-17 PROCEDURE — G0382 LEV 3 HOSP TYPE B ED VISIT: HCPCS

## 2020-06-17 RX ORDER — CLONIDINE HYDROCHLORIDE 0.2 MG/1
0.2 TABLET ORAL ONCE
Status: DISCONTINUED | OUTPATIENT
Start: 2020-06-17 | End: 2020-06-17

## 2020-06-17 RX ORDER — ATENOLOL 25 MG/1
25 TABLET ORAL DAILY
Qty: 30 TABLET | Refills: 0 | Status: SHIPPED | OUTPATIENT
Start: 2020-06-17

## 2020-06-17 RX ORDER — IBUPROFEN 200 MG
400 TABLET ORAL EVERY 6 HOURS PRN
COMMUNITY
End: 2021-02-19

## 2020-06-17 RX ORDER — CLONIDINE HYDROCHLORIDE 0.1 MG/1
0.1 TABLET ORAL ONCE
Status: COMPLETED | OUTPATIENT
Start: 2020-06-17 | End: 2020-06-17

## 2020-06-17 RX ADMIN — CLONIDINE HYDROCHLORIDE 0.1 MG: 0.1 TABLET ORAL at 14:59

## 2020-06-17 ASSESSMENT — PAIN DESCRIPTION - FREQUENCY: FREQUENCY: CONTINUOUS

## 2020-06-17 ASSESSMENT — PAIN SCALES - GENERAL: PAINLEVEL_OUTOF10: 7

## 2020-06-17 ASSESSMENT — PAIN DESCRIPTION - LOCATION: LOCATION: HEAD;CHEST

## 2020-06-17 ASSESSMENT — PAIN DESCRIPTION - ONSET: ONSET: ON-GOING

## 2020-06-17 ASSESSMENT — PAIN DESCRIPTION - PROGRESSION: CLINICAL_PROGRESSION: NOT CHANGED

## 2020-06-17 NOTE — ED PROVIDER NOTES
[meloxicam]; Propoxyphene; Tape [adhesive tape]; Theophylline; Cortisone; and Darvocet a500 [propoxyphene n-acetaminophen]    -------------------------------------------------- RESULTS -------------------------------------------------  All laboratory and radiology results have been personally reviewed by myself   LABS:  No results found for this visit on 06/17/20. RADIOLOGY:  Interpreted by Radiologist.  No orders to display       ------------------------- NURSING NOTES AND VITALS REVIEWED ---------------------------   The nursing notes within the ED encounter and vital signs as below have been reviewed. BP (!) 128/97   Pulse 97   Temp 98.6 °F (37 °C) (Temporal)   Resp 16   Ht 5' 3\" (1.6 m)   Wt 235 lb (106.6 kg)   LMP 06/17/2020   SpO2 99%   BMI 41.63 kg/m²   Oxygen Saturation Interpretation: Normal      ---------------------------------------------------PHYSICAL EXAM--------------------------------------      Constitutional/General: Alert and oriented x3, well appearing, non toxic in NAD  Head: NC/AT  Eyes: PERRL, EOMI  Mouth: Oropharynx clear, handling secretions, no trismus  Neck: Supple, full ROM, no meningeal signs  Pulmonary: Lungs clear to auscultation bilaterally, no wheezes, rales, or rhonchi. Not in respiratory distress  Cardiovascular:  Regular rate and rhythm, no murmurs, gallops, or rubs. 2+ distal pulses  Abdomen: Soft, non tender, non distended,   Extremities: Moves all extremities x 4. Warm and well perfused  Skin: warm and dry without rash  Neurologic: GCS 15,  Psych: Normal Affect      ------------------------------ ED COURSE/MEDICAL DECISION MAKING----------------------  Medications   cloNIDine (CATAPRES) tablet 0.1 mg (0.1 mg Oral Given 6/17/20 4384)         Medical Decision Making:    After discussing with the patient I detail, prescription for atenolol 25 mg QD given. Follow up with PCP.     Patient's Medications   New Prescriptions    ATENOLOL (TENORMIN) 25 MG TABLET    Take 1

## 2020-06-18 ENCOUNTER — CARE COORDINATION (OUTPATIENT)
Dept: CARE COORDINATION | Age: 40
End: 2020-06-18

## 2020-07-01 RX ORDER — PLECANATIDE 3 MG/1
TABLET ORAL DAILY
COMMUNITY
End: 2022-01-27

## 2020-07-06 ENCOUNTER — ANESTHESIA EVENT (OUTPATIENT)
Dept: OPERATING ROOM | Age: 40
End: 2020-07-06
Payer: COMMERCIAL

## 2020-07-06 ENCOUNTER — HOSPITAL ENCOUNTER (OUTPATIENT)
Age: 40
Discharge: HOME | End: 2020-07-08
Payer: COMMERCIAL

## 2020-07-06 PROCEDURE — U0003 INFECTIOUS AGENT DETECTION BY NUCLEIC ACID (DNA OR RNA); SEVERE ACUTE RESPIRATORY SYNDROME CORONAVIRUS 2 (SARS-COV-2) (CORONAVIRUS DISEASE [COVID-19]), AMPLIFIED PROBE TECHNIQUE, MAKING USE OF HIGH THROUGHPUT TECHNOLOGIES AS DESCRIBED BY CMS-2020-01-R: HCPCS

## 2020-07-06 ASSESSMENT — LIFESTYLE VARIABLES: SMOKING_STATUS: 0

## 2020-07-06 NOTE — ANESTHESIA PRE PROCEDURE
Department of Anesthesiology  Preprocedure Note       Name:  Yuli De León   Age:  36 y.o.  :  1980                                          MRN:  [id number]         Date:  2020      Surgeon: Judith Mcmillan): Juana Baron DO    Procedure: RIGHT KNEE ARTHROSCOPY, MEDIAL MENISCECTOMY  AND DEBRIDEMENT (Right )    Medications prior to admission:   Prior to Admission medications    Medication Sig Start Date End Date Taking? Authorizing Provider   Plecanatide (TRULANCE) 3 MG TABS Take by mouth daily    Historical Provider, MD   ibuprofen (ADVIL;MOTRIN) 200 MG tablet Take 400 mg by mouth every 6 hours as needed for Pain    Historical Provider, MD   atenolol (TENORMIN) 25 MG tablet Take 1 tablet by mouth daily  Patient taking differently: Take 25 mg by mouth daily Takes 1 1/2 tabs 20   Israel Davidson MD   FLUoxetine (PROZAC) 10 MG tablet Take 10 mg by mouth daily    Historical Provider, MD   Cholecalciferol (VITAMIN D3) 50 MCG (2000 UT) CAPS Take by mouth daily    Historical Provider, MD   oxyCODONE-acetaminophen (PERCOCET) 5-325 MG per tablet Take 1 tablet by mouth every 8 hours as needed for Pain.     Historical Provider, MD   albuterol sulfate  (90 Base) MCG/ACT inhaler Inhale 1 puff into the lungs every 4 hours as needed  19   Historical Provider, MD   dicyclomine (BENTYL) 20 MG tablet 1 TAB BY MOUTH TWICE A DAY 30 MIN BEFORE MEALS 10/19/19   Historical Provider, MD   levothyroxine (SYNTHROID) 125 MCG tablet Take 150 mcg by mouth Daily  19   Historical Provider, MD   Milnacipran HCl (SAVELLA PO) Take 50 mg by mouth 2 times daily     Historical Provider, MD   simvastatin (ZOCOR) 10 MG tablet Take 5 mg by mouth nightly     Historical Provider, MD   diclofenac sodium 1 % GEL Apply 2 g topically 4 times daily as needed for Pain Using 4 percent    Historical Provider, MD   tiZANidine (ZANAFLEX) 4 MG tablet Take 4 mg by mouth every 8 hours as needed     Historical Provider, MD amitriptyline (ELAVIL) 25 MG tablet Take 25 mg by mouth nightly    Historical Provider, MD   Elastic Bandages & Supports (KNEE BRACE) MISC Hinged knee brace to left knee 8/1/17   Jamal Delatorre DO   omeprazole (PRILOSEC) 40 MG delayed release capsule Take 20 mg by mouth daily     Historical Provider, MD   topiramate (TOPAMAX) 50 MG tablet Take 100 mg by mouth daily     Historical Provider, MD   montelukast (SINGULAIR) 10 MG tablet Take 10 mg by mouth nightly. Historical Provider, MD   valACYclovir (VALTREX) 500 MG tablet Take 500 mg by mouth as needed     Historical Provider, MD       Current medications:    Current Outpatient Medications   Medication Sig Dispense Refill    Plecanatide (TRULANCE) 3 MG TABS Take by mouth daily      ibuprofen (ADVIL;MOTRIN) 200 MG tablet Take 400 mg by mouth every 6 hours as needed for Pain      atenolol (TENORMIN) 25 MG tablet Take 1 tablet by mouth daily (Patient taking differently: Take 25 mg by mouth daily Takes 1 1/2 tabs) 30 tablet 0    FLUoxetine (PROZAC) 10 MG tablet Take 10 mg by mouth daily      Cholecalciferol (VITAMIN D3) 50 MCG (2000 UT) CAPS Take by mouth daily      oxyCODONE-acetaminophen (PERCOCET) 5-325 MG per tablet Take 1 tablet by mouth every 8 hours as needed for Pain.       albuterol sulfate  (90 Base) MCG/ACT inhaler Inhale 1 puff into the lungs every 4 hours as needed   5    dicyclomine (BENTYL) 20 MG tablet 1 TAB BY MOUTH TWICE A DAY 30 MIN BEFORE MEALS  3    levothyroxine (SYNTHROID) 125 MCG tablet Take 150 mcg by mouth Daily   5    Milnacipran HCl (SAVELLA PO) Take 50 mg by mouth 2 times daily       simvastatin (ZOCOR) 10 MG tablet Take 5 mg by mouth nightly       diclofenac sodium 1 % GEL Apply 2 g topically 4 times daily as needed for Pain Using 4 percent      tiZANidine (ZANAFLEX) 4 MG tablet Take 4 mg by mouth every 8 hours as needed       amitriptyline (ELAVIL) 25 MG tablet Take 25 mg by mouth nightly      Elastic Bandages & 01/03/2020    SHOULDER ARTHROSCOPY Right 1/3/2020    RIGHT SHOULDER ARTHROSCOPY, SUBACROMIAL DECOMPRESSION AND DEBRIDEMENT LABRIUM performed by Myron Pollock DO at [address] Left 04/12/2018    TONSILLECTOMY      TUBAL LIGATION         Social History:    Social History     Tobacco Use    Smoking status: Never Smoker    Smokeless tobacco: Never Used   Substance Use Topics    Alcohol use: No                                Counseling given: Not Answered      Vital Signs (Current): There were no vitals filed for this visit. BP Readings from Last 3 Encounters:   06/17/20 (!) 128/97   01/03/20 102/67   01/03/20 110/60       NPO Status:                                                                                 BMI:   Wt Readings from Last 3 Encounters:   06/17/20 235 lb (106.6 kg)   06/16/20 230 lb (104.3 kg)   02/17/20 229 lb 4 oz (104 kg)     There is no height or weight on file to calculate BMI.    CBC:   Lab Results   Component Value Date    WBC 3.5 12/26/2019    RBC 4.42 12/26/2019    HGB 13.6 12/26/2019    HCT 41.8 12/26/2019    MCV 94.6 12/26/2019    RDW 12.5 12/26/2019     12/26/2019       CMP:   Lab Results   Component Value Date     12/26/2019    K 4.5 12/26/2019     12/26/2019    CO2 21 12/26/2019    BUN 19 12/26/2019    CREATININE 1.2 12/26/2019    GFRAA >60 12/26/2019    LABGLOM 50 12/26/2019    GLUCOSE 107 12/26/2019    PROT 6.8 06/17/2019    CALCIUM 9.4 12/26/2019    BILITOT 0.3 06/17/2019    ALKPHOS 68 06/17/2019    AST 13 06/17/2019    ALT 13 06/17/2019       POC Tests: No results for input(s): POCGLU, POCNA, POCK, POCCL, POCBUN, POCHEMO, POCHCT in the last 72 hours.     Coags:   Lab Results   Component Value Date    PROTIME 13.6 07/31/2018    INR 1.2 07/31/2018    APTT 39.9 07/25/2014       HCG (If Applicable):   Lab Results   Component Value Date    PREGTESTUR NEGATIVE 06/12/2019        ABGs: No results and Prophylactic antiemetics administered. Anesthetic plan and risks discussed with patient. Plan discussed with CRNA. Manuela Garsia MD   7/6/2020    DOS STAFF ADDENDUM:    Pt seen and examined, chart reviewed (including anesthesia, drug and allergy history). Anesthetic plan, risks, benefits, alternatives, and personnel involved discussed with patient. Patient verbalized an understanding and agrees to proceed. Plan discussed with care team members and agreed upon.     Lev Whitlock MD  Staff Anesthesiologist  9:00 AM

## 2020-07-08 LAB
SARS-COV-2: NOT DETECTED
SOURCE: NORMAL

## 2020-07-10 ENCOUNTER — ANESTHESIA (OUTPATIENT)
Dept: OPERATING ROOM | Age: 40
End: 2020-07-10
Payer: COMMERCIAL

## 2020-07-10 ENCOUNTER — HOSPITAL ENCOUNTER (OUTPATIENT)
Age: 40
Setting detail: OUTPATIENT SURGERY
Discharge: HOME | End: 2020-07-10
Attending: ORTHOPAEDIC SURGERY | Admitting: ORTHOPAEDIC SURGERY
Payer: COMMERCIAL

## 2020-07-10 VITALS
OXYGEN SATURATION: 100 % | DIASTOLIC BLOOD PRESSURE: 68 MMHG | SYSTOLIC BLOOD PRESSURE: 103 MMHG | TEMPERATURE: 98.6 F | RESPIRATION RATE: 12 BRPM

## 2020-07-10 VITALS
DIASTOLIC BLOOD PRESSURE: 61 MMHG | TEMPERATURE: 97 F | OXYGEN SATURATION: 100 % | WEIGHT: 241 LBS | HEART RATE: 70 BPM | BODY MASS INDEX: 42.7 KG/M2 | HEIGHT: 63 IN | RESPIRATION RATE: 16 BRPM | SYSTOLIC BLOOD PRESSURE: 128 MMHG

## 2020-07-10 LAB
HCG, URINE, POC: NEGATIVE
Lab: NORMAL
METER GLUCOSE: 93 MG/DL (ref 74–99)
NEGATIVE QC PASS/FAIL: NORMAL
POSITIVE QC PASS/FAIL: NORMAL

## 2020-07-10 PROCEDURE — 7100000010 HC PHASE II RECOVERY - FIRST 15 MIN: Performed by: ORTHOPAEDIC SURGERY

## 2020-07-10 PROCEDURE — 2709999900 HC NON-CHARGEABLE SUPPLY: Performed by: ORTHOPAEDIC SURGERY

## 2020-07-10 PROCEDURE — 2580000003 HC RX 258: Performed by: ANESTHESIOLOGY

## 2020-07-10 PROCEDURE — 2500000003 HC RX 250 WO HCPCS: Performed by: NURSE ANESTHETIST, CERTIFIED REGISTERED

## 2020-07-10 PROCEDURE — 6360000002 HC RX W HCPCS: Performed by: NURSE ANESTHETIST, CERTIFIED REGISTERED

## 2020-07-10 PROCEDURE — 3600000003 HC SURGERY LEVEL 3 BASE: Performed by: ORTHOPAEDIC SURGERY

## 2020-07-10 PROCEDURE — 2720000010 HC SURG SUPPLY STERILE: Performed by: ORTHOPAEDIC SURGERY

## 2020-07-10 PROCEDURE — 29877 ARTHRS KNEE SURG DBRDMT/SHVG: CPT | Performed by: ORTHOPAEDIC SURGERY

## 2020-07-10 PROCEDURE — 81025 URINE PREGNANCY TEST: CPT | Performed by: ORTHOPAEDIC SURGERY

## 2020-07-10 PROCEDURE — 3700000000 HC ANESTHESIA ATTENDED CARE: Performed by: ORTHOPAEDIC SURGERY

## 2020-07-10 PROCEDURE — 3600000013 HC SURGERY LEVEL 3 ADDTL 15MIN: Performed by: ORTHOPAEDIC SURGERY

## 2020-07-10 PROCEDURE — 82962 GLUCOSE BLOOD TEST: CPT

## 2020-07-10 PROCEDURE — 6370000000 HC RX 637 (ALT 250 FOR IP)

## 2020-07-10 PROCEDURE — 7100000011 HC PHASE II RECOVERY - ADDTL 15 MIN: Performed by: ORTHOPAEDIC SURGERY

## 2020-07-10 PROCEDURE — 3700000001 HC ADD 15 MINUTES (ANESTHESIA): Performed by: ORTHOPAEDIC SURGERY

## 2020-07-10 PROCEDURE — 7100000000 HC PACU RECOVERY - FIRST 15 MIN: Performed by: ORTHOPAEDIC SURGERY

## 2020-07-10 PROCEDURE — 2500000003 HC RX 250 WO HCPCS: Performed by: ORTHOPAEDIC SURGERY

## 2020-07-10 PROCEDURE — 7100000001 HC PACU RECOVERY - ADDTL 15 MIN: Performed by: ORTHOPAEDIC SURGERY

## 2020-07-10 PROCEDURE — 2580000003 HC RX 258: Performed by: ORTHOPAEDIC SURGERY

## 2020-07-10 PROCEDURE — 2500000003 HC RX 250 WO HCPCS: Performed by: NURSE PRACTITIONER

## 2020-07-10 PROCEDURE — 82962 GLUCOSE BLOOD TEST: CPT | Performed by: ORTHOPAEDIC SURGERY

## 2020-07-10 RX ORDER — BUPIVACAINE HYDROCHLORIDE 2.5 MG/ML
INJECTION, SOLUTION EPIDURAL; INFILTRATION; INTRACAUDAL PRN
Status: DISCONTINUED | OUTPATIENT
Start: 2020-07-10 | End: 2020-07-10 | Stop reason: ALTCHOICE

## 2020-07-10 RX ORDER — MIDAZOLAM HYDROCHLORIDE 1 MG/ML
INJECTION INTRAMUSCULAR; INTRAVENOUS PRN
Status: DISCONTINUED | OUTPATIENT
Start: 2020-07-10 | End: 2020-07-10 | Stop reason: SDUPTHER

## 2020-07-10 RX ORDER — MEPERIDINE HYDROCHLORIDE 25 MG/ML
12.5 INJECTION INTRAMUSCULAR; INTRAVENOUS; SUBCUTANEOUS EVERY 5 MIN PRN
Status: DISCONTINUED | OUTPATIENT
Start: 2020-07-10 | End: 2020-07-10 | Stop reason: HOSPADM

## 2020-07-10 RX ORDER — ONDANSETRON 2 MG/ML
INJECTION INTRAMUSCULAR; INTRAVENOUS PRN
Status: DISCONTINUED | OUTPATIENT
Start: 2020-07-10 | End: 2020-07-10 | Stop reason: SDUPTHER

## 2020-07-10 RX ORDER — PROPOFOL 10 MG/ML
INJECTION, EMULSION INTRAVENOUS PRN
Status: DISCONTINUED | OUTPATIENT
Start: 2020-07-10 | End: 2020-07-10 | Stop reason: SDUPTHER

## 2020-07-10 RX ORDER — GLYCOPYRROLATE 1 MG/5 ML
SYRINGE (ML) INTRAVENOUS PRN
Status: DISCONTINUED | OUTPATIENT
Start: 2020-07-10 | End: 2020-07-10 | Stop reason: SDUPTHER

## 2020-07-10 RX ORDER — OXYCODONE HYDROCHLORIDE AND ACETAMINOPHEN 5; 325 MG/1; MG/1
TABLET ORAL
Status: COMPLETED
Start: 2020-07-10 | End: 2020-07-10

## 2020-07-10 RX ORDER — DIPHENHYDRAMINE HYDROCHLORIDE 50 MG/ML
INJECTION INTRAMUSCULAR; INTRAVENOUS PRN
Status: DISCONTINUED | OUTPATIENT
Start: 2020-07-10 | End: 2020-07-10 | Stop reason: SDUPTHER

## 2020-07-10 RX ORDER — OXYCODONE HYDROCHLORIDE AND ACETAMINOPHEN 5; 325 MG/1; MG/1
2 TABLET ORAL ONCE
Status: DISCONTINUED | OUTPATIENT
Start: 2020-07-10 | End: 2020-07-10 | Stop reason: HOSPADM

## 2020-07-10 RX ORDER — CLINDAMYCIN PHOSPHATE 900 MG/50ML
900 INJECTION INTRAVENOUS ONCE
Status: COMPLETED | OUTPATIENT
Start: 2020-07-10 | End: 2020-07-10

## 2020-07-10 RX ORDER — MAGNESIUM HYDROXIDE 1200 MG/15ML
LIQUID ORAL CONTINUOUS PRN
Status: COMPLETED | OUTPATIENT
Start: 2020-07-10 | End: 2020-07-10

## 2020-07-10 RX ORDER — FENTANYL CITRATE 50 UG/ML
INJECTION, SOLUTION INTRAMUSCULAR; INTRAVENOUS PRN
Status: DISCONTINUED | OUTPATIENT
Start: 2020-07-10 | End: 2020-07-10 | Stop reason: SDUPTHER

## 2020-07-10 RX ORDER — DEXAMETHASONE SODIUM PHOSPHATE 10 MG/ML
INJECTION, SOLUTION INTRAMUSCULAR; INTRAVENOUS PRN
Status: DISCONTINUED | OUTPATIENT
Start: 2020-07-10 | End: 2020-07-10 | Stop reason: SDUPTHER

## 2020-07-10 RX ORDER — SODIUM CHLORIDE, SODIUM LACTATE, POTASSIUM CHLORIDE, CALCIUM CHLORIDE 600; 310; 30; 20 MG/100ML; MG/100ML; MG/100ML; MG/100ML
INJECTION, SOLUTION INTRAVENOUS CONTINUOUS
Status: DISCONTINUED | OUTPATIENT
Start: 2020-07-10 | End: 2020-07-10 | Stop reason: HOSPADM

## 2020-07-10 RX ORDER — LIDOCAINE HYDROCHLORIDE 20 MG/ML
INJECTION, SOLUTION INFILTRATION; PERINEURAL PRN
Status: DISCONTINUED | OUTPATIENT
Start: 2020-07-10 | End: 2020-07-10 | Stop reason: SDUPTHER

## 2020-07-10 RX ORDER — ONDANSETRON 2 MG/ML
4 INJECTION INTRAMUSCULAR; INTRAVENOUS
Status: DISCONTINUED | OUTPATIENT
Start: 2020-07-10 | End: 2020-07-10 | Stop reason: HOSPADM

## 2020-07-10 RX ADMIN — MIDAZOLAM 2 MG: 1 INJECTION INTRAMUSCULAR; INTRAVENOUS at 09:49

## 2020-07-10 RX ADMIN — ONDANSETRON 4 MG: 2 INJECTION INTRAMUSCULAR; INTRAVENOUS at 10:15

## 2020-07-10 RX ADMIN — PROPOFOL 200 MG: 10 INJECTION, EMULSION INTRAVENOUS at 09:50

## 2020-07-10 RX ADMIN — SODIUM CHLORIDE, POTASSIUM CHLORIDE, SODIUM LACTATE AND CALCIUM CHLORIDE: 600; 310; 30; 20 INJECTION, SOLUTION INTRAVENOUS at 08:32

## 2020-07-10 RX ADMIN — Medication 0.2 MG: at 09:50

## 2020-07-10 RX ADMIN — DEXAMETHASONE SODIUM PHOSPHATE 10 MG: 10 INJECTION, SOLUTION INTRAMUSCULAR; INTRAVENOUS at 09:53

## 2020-07-10 RX ADMIN — FENTANYL CITRATE 50 MCG: 50 INJECTION, SOLUTION INTRAMUSCULAR; INTRAVENOUS at 10:27

## 2020-07-10 RX ADMIN — FENTANYL CITRATE 150 MCG: 50 INJECTION, SOLUTION INTRAMUSCULAR; INTRAVENOUS at 09:50

## 2020-07-10 RX ADMIN — DIPHENHYDRAMINE HYDROCHLORIDE 12.5 MG: 50 INJECTION, SOLUTION INTRAMUSCULAR; INTRAVENOUS at 09:59

## 2020-07-10 RX ADMIN — OXYCODONE HYDROCHLORIDE AND ACETAMINOPHEN 2 TABLET: 5; 325 TABLET ORAL at 11:19

## 2020-07-10 RX ADMIN — CLINDAMYCIN PHOSPHATE 900 MG: 18 INJECTION, SOLUTION INTRAVENOUS at 09:46

## 2020-07-10 RX ADMIN — FENTANYL CITRATE 50 MCG: 50 INJECTION, SOLUTION INTRAMUSCULAR; INTRAVENOUS at 10:00

## 2020-07-10 RX ADMIN — LIDOCAINE HYDROCHLORIDE 50 MG: 20 INJECTION, SOLUTION INFILTRATION; PERINEURAL at 09:50

## 2020-07-10 ASSESSMENT — PULMONARY FUNCTION TESTS
PIF_VALUE: 4
PIF_VALUE: 22
PIF_VALUE: 1
PIF_VALUE: 5
PIF_VALUE: 5
PIF_VALUE: 17
PIF_VALUE: 14
PIF_VALUE: 5
PIF_VALUE: 4
PIF_VALUE: 5
PIF_VALUE: 4
PIF_VALUE: 5
PIF_VALUE: 5
PIF_VALUE: 4
PIF_VALUE: 14
PIF_VALUE: 5
PIF_VALUE: 4
PIF_VALUE: 1
PIF_VALUE: 4
PIF_VALUE: 3
PIF_VALUE: 5
PIF_VALUE: 3
PIF_VALUE: 5
PIF_VALUE: 14
PIF_VALUE: 4
PIF_VALUE: 14
PIF_VALUE: 4
PIF_VALUE: 14
PIF_VALUE: 14
PIF_VALUE: 15
PIF_VALUE: 5

## 2020-07-10 ASSESSMENT — PAIN DESCRIPTION - ORIENTATION
ORIENTATION: RIGHT

## 2020-07-10 ASSESSMENT — PAIN DESCRIPTION - PAIN TYPE
TYPE: SURGICAL PAIN
TYPE: SURGICAL PAIN;CHRONIC PAIN
TYPE: SURGICAL PAIN
TYPE: SURGICAL PAIN

## 2020-07-10 ASSESSMENT — PAIN SCALES - GENERAL
PAINLEVEL_OUTOF10: 2
PAINLEVEL_OUTOF10: 5
PAINLEVEL_OUTOF10: 6
PAINLEVEL_OUTOF10: 6
PAINLEVEL_OUTOF10: 0

## 2020-07-10 ASSESSMENT — PAIN DESCRIPTION - LOCATION
LOCATION: KNEE

## 2020-07-10 ASSESSMENT — PAIN DESCRIPTION - DESCRIPTORS
DESCRIPTORS: ACHING

## 2020-07-10 ASSESSMENT — PAIN - FUNCTIONAL ASSESSMENT: PAIN_FUNCTIONAL_ASSESSMENT: 0-10

## 2020-07-10 NOTE — H&P
Updated H&P    Chief Complaint   Patient presents with    Knee Pain       Right knee pain follow up. Complains of cracking and popping in the knee.         Lindsay  returns today for follow-up of her right knee pain. she reports this is worse than when I saw her last.  The patient's pain level is a 8/10.  She states she has popping in her knee everyday.     Past Medical History        Past Medical History:   Diagnosis Date    Arthritis      Asthma      Back pain      Cervical disc disease      Depression      Diabetes mellitus (HCC)      Ectopic pregnancy      Fibromyalgia      GERD (gastroesophageal reflux disease)      Heart murmur       benign    Herpes genitalia      History of blood transfusion     Hyperlipidemia      Hypertension      Migraines      Ovarian cyst      PONV (postoperative nausea and vomiting)       with anesthesia    Renal insufficiency       very slight see's Dr Mc Prophet    Thyroid disease           Past Surgical History         Past Surgical History:   Procedure Laterality Date    CERVICAL FUSION   2014     anterior cervical discetomy fusion C6-C7    CERVICAL SPINE SURGERY         has a plate    DILATION AND CURETTAGE OF UTERUS        ECHO COMPL W DOP COLOR FLOW   2013          KNEE ARTHROSCOPY Right 2014    KNEE ARTHROSCOPY Right 2016     Arthroscopy right knee chondroplasty synovectomy medial and lateral menisectomy    KNEE ARTHROSCOPY Left 2016     left knee arthoscopy medial meniscectomy chodroplasty synovectomy    NERVE BLOCK Bilateral 2016     lumbar facet #1    OTHER SURGICAL HISTORY         partial rt  fallopian tube removed    [place] SHOULDER SCOPE BONE SHAVING Left 2018     LEFT SHOULDER ARTHROSCOPY, SUBACROMIAL DECOMPRESSION AND DEBRIDEMENT performed by Echo Frost DO at [address] ARTHROSCOPY Right 2020    SHOULDER ARTHROSCOPY Right 1/3/2020     RIGHT SHOULDER ARTHROSCOPY, SUBACROMIAL DECOMPRESSION AND DEBRIDEMENT LABRIUM performed by Alisa Goodwin DO at [address]  Left 04/12/2018    TONSILLECTOMY        TUBAL LIGATION               Current Medication      Current Outpatient Medications:     ibuprofen (ADVIL;MOTRIN) 800 MG tablet, take 1 tablet by oral route 2 times every day with food as needed, Disp: , Rfl:     FLUoxetine (PROZAC) 10 MG tablet, Take 10 mg by mouth daily, Disp: , Rfl:     Cholecalciferol (VITAMIN D3) 50 MCG (2000 UT) CAPS, Take by mouth daily, Disp: , Rfl:     oxyCODONE-acetaminophen (PERCOCET) 5-325 MG per tablet, Take 1 tablet by mouth every 8 hours as needed for Pain., Disp: , Rfl:     albuterol sulfate  (90 Base) MCG/ACT inhaler, , Disp: , Rfl: 5    dicyclomine (BENTYL) 20 MG tablet, 1 TAB BY MOUTH TWICE A DAY 30 MIN BEFORE MEALS, Disp: , Rfl: 3    levothyroxine (SYNTHROID) 125 MCG tablet, Take 125 mcg by mouth Daily , Disp: , Rfl: 5    Milnacipran HCl (SAVELLA PO), Take 50 mg by mouth 2 times daily , Disp: , Rfl:     simvastatin (ZOCOR) 10 MG tablet, Take 5 mg by mouth nightly , Disp: , Rfl:     diclofenac sodium 1 % GEL, Apply 2 g topically 4 times daily as needed for Pain Using 4 percent, Disp: , Rfl:     tiZANidine (ZANAFLEX) 4 MG tablet, Take 4 mg by mouth every 8 hours as needed , Disp: , Rfl:     amitriptyline (ELAVIL) 25 MG tablet, Take 25 mg by mouth nightly, Disp: , Rfl:     Elastic Bandages & Supports (KNEE BRACE) MISC, Hinged knee brace to left knee, Disp: 1 each, Rfl: 0    omeprazole (PRILOSEC) 40 MG delayed release capsule, Take 20 mg by mouth daily , Disp: , Rfl:     vitamin D (ERGOCALCIFEROL) 02402 UNITS CAPS capsule, Take 50,000 Units by mouth once a week, Disp: , Rfl:     topiramate (TOPAMAX) 50 MG tablet, Take 100 mg by mouth daily , Disp: , Rfl:     metFORMIN (GLUCOPHAGE) 500 MG tablet, Take 500 mg by mouth daily (with breakfast) , Disp: , Rfl:     atenolol (TENORMIN) 25 MG tablet, Take 25 mg by mouth daily 1/2 tab daily, Disp: , Rfl:     montelukast (SINGULAIR) 10 MG tablet, Take 10 mg by mouth nightly., Disp: , Rfl:     valACYclovir (VALTREX) 500 MG tablet, Take 500 mg by mouth as needed , Disp: , Rfl:            Allergies   Allergen Reactions    Acetaminophen Other (See Comments)    Aspirin Nausea Only and Other (See Comments)       Heart rate goes up    Folic Acid Other (See Comments)    Gabapentin         Dizziness    Mobic [Meloxicam] Other (See Comments)       \"Almost a stroke, couldn't talk, right side numbness\"    Propoxyphene         hives    Tape [Adhesive Tape] Other (See Comments)       \"skin spots\"    Theophylline Other (See Comments)    Cortisone Hives, Nausea And Vomiting and Other (See Comments)       All steroids; heart rate goes up, massive migraines        Darvocet A500 [Propoxyphene N-Acetaminophen] Hives, Nausea And Vomiting and Other (See Comments)       Massive migraines      Social History               Socioeconomic History    Marital status:        Spouse name: Not on file    Number of children: Not on file    Years of education: Not on file    Highest education level: Not on file   Occupational History    Not on file   Social Needs    Financial resource strain: Not on file    Food insecurity       Worry: Not on file       Inability: Not on file    Transportation needs       Medical: Not on file       Non-medical: Not on file   Tobacco Use    Smoking status: Former Smoker    Smokeless tobacco: Never Used   Substance and Sexual Activity    Alcohol use: No    Drug use: No    Sexual activity: Not on file   Lifestyle    Physical activity       Days per week: Not on file       Minutes per session: Not on file    Stress: Not on file   Relationships    Social connections       Talks on phone: Not on file       Gets together: Not on file       Attends Buddhism service: Not on file       Active member of club or organization: Not on file       Attends meetings of clubs or organizations: Not on file       Relationship status: Not on file    Intimate partner violence       Fear of current or ex partner: Not on file       Emotionally abused: Not on file       Physically abused: Not on file       Forced sexual activity: Not on file   Other Topics Concern    Not on file   Social History Narrative    Not on file         Family History         Family History   Problem Relation Age of Onset    Diabetes Mother      Migraines Mother      Hypertension Mother      Ovarian Cancer Sister      Mental Illness Other      Arthritis Other      Hypertension Other      No Known Problems Father              Review of Systems:      Skin: (-) rash,(-) psoriasis,(-) eczema, (-)skin cancer. Musculoskeletal: (-) fractures,  (-) dislocations,(-) collagen vascular disease, (-) fibromyalgia, (-) multiple sclerosis, (-) muscular dystrophy, (-) RSD,(-) joint pain (-)swelling, (-) joint pain,swelling. Neurologic: (-) epilepsy, (-)seizures,(-) brain tumor,(-) TIA, (-)stroke, (-)headaches, (-)Parkinson disease,(-) memory loss, (-) LOC. Cardiovascular: (-) Chest pain, (-) swelling in legs/feet, (-) SOB, (-) cramping in legs/feet with walking.       Vital signs are stable.  In general, patient is awake, alert and oriented X3, in no apparent distress.  Examination of HENT reveals normocephalic, atraumatic.  PERRLA/EOMI sclera are white.  Conjunctivae are clear.  TM's are intact.  Pharynx is pink and moist.  Uvula and tongue are midline.  Heart: Positive S1 and positive S2 with regular rate and rhythm.  Lungs: Clear to auscultation bilaterally without rales, rhonchi or wheezes.  Abdomen: soft, nontender.  Positive bowel sounds.  No organomegaly.  No guarding or rigidity.        Constitutional:  The patient is alert and oriented x 3, appears to be stated age and in no distress.  /86   Pulse 78   Temp 97.5 °F (36.4 °C) (Skin)   Resp 16   Ht 5' 3\" (1.6 m)   Wt 241 lb some mild tenderness laxity is not noted with stress.       R. Knee:  Lachman's negative, Anterior Drawer negative, Posterior Drawer negative  Miranda's positive, Thallasy  positive,   PF grind test negative, Apprehension test negative, Patellar J sign  negative     L. Knee:  Lachman's negative, Anterior Drawer negative, Posterior Drawer negative  Miranda's negative, Thallasy  negative,   PF grind test negative, Apprehension test negative,  Patellar J sign  negative     Xrays:   Mild tricompartmental osteophyte formation within the right knee indicative   of mild osteoarthritis.          MRI:   Not performed today. Radiographic findings reviewed with patient     Impression:       Encounter Diagnoses   Name Primary?  Primary osteoarthritis of right knee Yes    Acute medial meniscus tear, right, initial encounter           Plan:   Natural history and expected course discussed. Questions answered. Educational materials distributed. Rest, ice, compression, and elevation (RICE) therapy. I had a lengthy discussion with the patient regarding their diagnosis. I explained treatment options including surgical vs non surgical treatment. I reviewed in detail the risks and benefits and outlined the procedure in detail with expected outcomes and possible complications. I also discussed non surgical treatment such as injections (CSI and visco supplementation), physical therapy, topical creams and NSAID's. They have elected for surgical management at this time. The risks and benefits of a knee arthroscopy were discussed with the patient. The risks are including but not limited to: infection, injuries to blood vessels and nerves, non relief of symptoms, arthrofibrosis of knee, need for further operative intervention, blood loss, PE/DVT, MI and death.   The risks are understood by the patient and they wish to proceed with a Right knee arthroscopy, medial menisectomy and debridement 07/10/2020.       The patient was counseled at length about the risks of lorraine Covid-19 during their perioperative period and any recovery window from their procedure.  The patient was made aware that lorraine Covid-19  may worsen their prognosis for recovering from their procedure  and lend to a higher morbidity and/or mortality risk.  All material risks, benefits, and reasonable alternatives including postponing the procedure were discussed.  The patient does wish to proceed with the procedure at this time.

## 2020-07-10 NOTE — ANESTHESIA POSTPROCEDURE EVALUATION
Department of Anesthesiology  Postprocedure Note    Patient: Jaylen Gomez  MRN: [id number]  [place]: 1980  Date of evaluation: 7/10/2020  Time:  12:14 PM     Procedure Summary     Date:  07/10/20 Room / Location:  [address] [place] [address] 01 / [address] [facility], [place], operated by [facility]    Anesthesia Start:  7794 Anesthesia Stop:  5686    Procedure:  RIGHT KNEE ARTHROSCOPY, MEDIAL MENISCECTOMY AND DEBRIDEMENT (Right ) Diagnosis:  (RIGHT KNEE DJD, RIGHT MEDIAL MENISCUS TEAR)    Surgeon:  José Miguel Hutchins DO Responsible Provider:  Rico Dodd MD    Anesthesia Type:  general ASA Status:  3          Anesthesia Type: general    Peter Phase I: Peter Score: 10    Peter Phase II: Peter Score: 10    Last vitals: Reviewed and per EMR flowsheets.        Anesthesia Post Evaluation    Patient location during evaluation: PACU  Patient participation: complete - patient participated  Level of consciousness: awake and alert  Airway patency: patent  Nausea & Vomiting: no vomiting and no nausea  Complications: no  Cardiovascular status: hemodynamically stable  Respiratory status: acceptable  Hydration status: stable

## 2020-07-10 NOTE — OP NOTE
Operative Note      Patient: Lindsay   YOB: 1980  MRN: [id number]    Date of Procedure: 7/10/2020    Pre-Op Diagnosis: RIGHT KNEE DJD, RIGHT MEDIAL MENISCUS TEAR    Post-Op Diagnosis: right knee djd       Procedure(s):  RIGHT KNEE ARTHROSCOPY, chondroplasty AND DEBRIDEMENT    Surgeon(s): Echo Frost DO    Assistant:   Resident: Kyle Del Toro DO; Machelle Mayer DO    Anesthesia: General    Estimated Blood Loss (mL): Minimal    Complications: None    Specimens:   * No specimens in log *    Implants:  * No implants in log *      Drains: * No LDAs found *    Findings: below    Detailed Description of Procedure:   below    DATE OF PROCEDURE: 7/10/20  SURGEON: Marlan Bernheim, D.O.   ASSISTANT:as above  PREOPERATIVE DIAGNOSES: (1) Right knee medial meniscus tear. (2)Tricompartmental synovitis. (3) DJD knee   POSTOPERATIVE DIAGNOSIS:as above  PROCEDURE: (1) Right knee arthroscopy Tricompartmental synovectomy. (2) Chondroplasty of patella/trochlea/mfc  ANESTHESIA: general  ESTIMATED BLOOD LOSS: Minimal.   COMPLICATIONS: None. OPERATIVE PROCEDURE: The patient was taken to the operative suite and was   given general anesthesia. The Right knee was identified with preoperative time-out. I applied a tourniquet to the  thigh, placed the  leg in a legholder, prepped and draped the leg in sterile fashion. I outlined an   incision along the anteromedial and anterolateral aspects of the knee. An incision was then made in the anterior medial and lateral aspects of the knee with an 11 blade. A blunt trocar was then inserted within the knee and I carried out a diagnostic arthroscopy. The patient had evidence of synovitis within the suprapatellar pouch, medial and lateral aspects of the knee. There was a large suprapatellar, medial and infrapatellar plica. The patellar surface was grade III  and trochlear surface was grade III-IV 2c2cm.   I then advanced the scope into the intracondylar notch.  The patients ACL/ PCL were intact. I then advanced the scope into the medial compartment. The medial femoral condyle had grade III-IV defects measuring 2x2cm. The medial tibial plateau had grade III-Iv defects measuring 1x1cm. There was not tear involving the body and posterior horn of the medial meniscus which was unstable to probing. I then advanced the scope into the lateral compartment compartment. The lateral femoral condyle had stable defects measuring 0. The lateral tibial plateau had stable defects measuring 0. There was not tear involving the body and posterior horn of the lateral meniscus which was unstable to probing. I then established a medial working portal and carried out a tricompartmental   synovectomy removing angry synovium from the suprapatellar pouch, medial and   lateral compartments. The suprapatellar/medial and infrapatellar plica were removed using the 4-0 shaver. The articular surface defects involving the mfc/patella/trochlea  were performed using the 4.0 Aggressive Plus shaver in a forward manner, smoothed all unstable articular cartilage. The incisions were then closed with a 4-0 Prolene in single interrupted fashion. A   sterile dressing was placed on the wound. Patient recovered in the recovery   room without difficulty.        Electronically signed by Eleonora Medina DO on 7/10/2020 at 10:27 AM

## 2020-07-10 NOTE — LETTER
[facility]  [place]  Phone: [phone]        July 10, 2020     Patient: Nicolette Bailey   YOB: 1980   Date of Visit: 7/1/2020       To Whom it May Concern:    Nicolette Bailey was seen at the [facility] today 07/10/2020. Please excuse Gabby Vlad from work today, he was needed for her surgery. If you have any questions or concerns, please don't hesitate to call.     Sincerely,         Buddy Wang RN

## 2020-07-24 ENCOUNTER — OFFICE VISIT (OUTPATIENT)
Dept: ORTHOPEDIC SURGERY | Age: 40
End: 2020-07-24

## 2020-07-24 VITALS — WEIGHT: 240 LBS | BODY MASS INDEX: 42.52 KG/M2 | HEIGHT: 63 IN

## 2020-07-24 PROCEDURE — 99024 POSTOP FOLLOW-UP VISIT: CPT | Performed by: NURSE PRACTITIONER

## 2020-07-24 NOTE — PATIENT INSTRUCTIONS
Patient Education        Knee Arthritis: Exercises  Introduction  Here are some examples of exercises for you to try. The exercises may be suggested for a condition or for rehabilitation. Start each exercise slowly. Ease off the exercises if you start to have pain. You will be told when to start these exercises and which ones will work best for you. How to do the exercises  Knee flexion with heel slide   1. Lie on your back with your knees bent. 2. Slide your heel back by bending your affected knee as far as you can. Then hook your other foot around your ankle to help pull your heel even farther back. 3. Hold for about 6 seconds, then rest for up to 10 seconds. 4. Repeat 8 to 12 times. 5. Switch legs and repeat steps 1 through 4, even if only one knee is sore. Quad sets   1. Sit with your affected leg straight and supported on the floor or a firm bed. Place a small, rolled-up towel under your knee. Your other leg should be bent, with that foot flat on the floor. 2. Tighten the thigh muscles of your affected leg by pressing the back of your knee down into the towel. 3. Hold for about 6 seconds, then rest for up to 10 seconds. 4. Repeat 8 to 12 times. 5. Switch legs and repeat steps 1 through 4, even if only one knee is sore. Straight-leg raises to the front   1. Lie on your back with your good knee bent so that your foot rests flat on the floor. Your affected leg should be straight. Make sure that your low back has a normal curve. You should be able to slip your hand in between the floor and the small of your back, with your palm touching the floor and your back touching the back of your hand. 2. Tighten the thigh muscles in your affected leg by pressing the back of your knee flat down to the floor. Hold your knee straight. 3. Keeping the thigh muscles tight and your leg straight, lift your affected leg up so that your heel is about 12 inches off the floor.  Hold for about 6 seconds, then lower slowly. 4. Relax for up to 10 seconds between repetitions. 5. Repeat 8 to 12 times. 6. Switch legs and repeat steps 1 through 5, even if only one knee is sore. Active knee flexion   1. Lie on your stomach with your knees straight. If your kneecap is uncomfortable, roll up a washcloth and put it under your leg just above your kneecap. 2. Lift the foot of your affected leg by bending the knee so that you bring the foot up toward your buttock. If this motion hurts, try it without bending your knee quite as far. This may help you avoid any painful motion. 3. Slowly move your leg up and down. 4. Repeat 8 to 12 times. 5. Switch legs and repeat steps 1 through 4, even if only one knee is sore. Quadriceps stretch (facedown)   1. Lie flat on your stomach, and rest your face on the floor. 2. Wrap a towel or belt strap around the lower part of your affected leg. Then use the towel or belt strap to slowly pull your heel toward your buttock until you feel a stretch. 3. Hold for about 15 to 30 seconds, then relax your leg against the towel or belt strap. 4. Repeat 2 to 4 times. 5. Switch legs and repeat steps 1 through 4, even if only one knee is sore. Stationary exercise bike   1. If you do not have a stationary exercise bike at home, you can find one to ride at your local health club or community center. 2. Adjust the height of the bike seat so that your knee is slightly bent when your leg is extended downward. If your knee hurts when the pedal reaches the top, you can raise the seat so that your knee does not bend as much. 3. Start slowly. At first, try to do 5 to 10 minutes of cycling with little to no resistance. Then increase your time and the resistance bit by bit until you can do 20 to 30 minutes without pain. 4. If you start to have pain, rest your knee until your pain gets back to the level that is normal for you. Or cycle for less time or with less effort.     Follow-up care is a key part of your treatment and safety. Be sure to make and go to all appointments, and call your doctor if you are having problems. It's also a good idea to know your test results and keep a list of the medicines you take. Where can you learn more? Go to https://rhea.[employer]. org and sign in to your [employer] account. Enter C159 in the [employer] box to learn more about \"Knee Arthritis: Exercises. \"     If you do not have an account, please click on the \"Sign Up Now\" link. Current as of: March 2, 2020               Content Version: 12.5  © 2006-2020 [employer]. Care instructions adapted under license by [facility]. If you have questions about a medical condition or this instruction, always ask your healthcare professional. [address] any warranty or liability for your use of this information. Patient Education        Knee Arthritis: Exercises  Introduction  Here are some examples of exercises for you to try. The exercises may be suggested for a condition or for rehabilitation. Start each exercise slowly. Ease off the exercises if you start to have pain. You will be told when to start these exercises and which ones will work best for you. How to do the exercises  Knee flexion with heel slide   6. Lie on your back with your knees bent. 7. Slide your heel back by bending your affected knee as far as you can. Then hook your other foot around your ankle to help pull your heel even farther back. 8. Hold for about 6 seconds, then rest for up to 10 seconds. 9. Repeat 8 to 12 times. 10. Switch legs and repeat steps 1 through 4, even if only one knee is sore. Quad sets   6. Sit with your affected leg straight and supported on the floor or a firm bed. Place a small, rolled-up towel under your knee. Your other leg should be bent, with that foot flat on the floor.   7. Tighten the thigh muscles of your affected leg by pressing the back of your knee down into the towel. 8. Hold for about 6 seconds, then rest for up to 10 seconds. 9. Repeat 8 to 12 times. 10. Switch legs and repeat steps 1 through 4, even if only one knee is sore. Straight-leg raises to the front   7. Lie on your back with your good knee bent so that your foot rests flat on the floor. Your affected leg should be straight. Make sure that your low back has a normal curve. You should be able to slip your hand in between the floor and the small of your back, with your palm touching the floor and your back touching the back of your hand. 8. Tighten the thigh muscles in your affected leg by pressing the back of your knee flat down to the floor. Hold your knee straight. 9. Keeping the thigh muscles tight and your leg straight, lift your affected leg up so that your heel is about 12 inches off the floor. Hold for about 6 seconds, then lower slowly. 10. Relax for up to 10 seconds between repetitions. 11. Repeat 8 to 12 times. 12. Switch legs and repeat steps 1 through 5, even if only one knee is sore. Active knee flexion   6. Lie on your stomach with your knees straight. If your kneecap is uncomfortable, roll up a washcloth and put it under your leg just above your kneecap. 7. Lift the foot of your affected leg by bending the knee so that you bring the foot up toward your buttock. If this motion hurts, try it without bending your knee quite as far. This may help you avoid any painful motion. 8. Slowly move your leg up and down. 9. Repeat 8 to 12 times. 10. Switch legs and repeat steps 1 through 4, even if only one knee is sore. Quadriceps stretch (facedown)   6. Lie flat on your stomach, and rest your face on the floor. 7. Wrap a towel or belt strap around the lower part of your affected leg. Then use the towel or belt strap to slowly pull your heel toward your buttock until you feel a stretch.   8. Hold for about 15 to 30 seconds, then relax your leg against the towel or belt strap.  9. Repeat 2 to 4 times. 10. Switch legs and repeat steps 1 through 4, even if only one knee is sore. Stationary exercise bike   5. If you do not have a stationary exercise bike at home, you can find one to ride at your local health club or community center. 6. Adjust the height of the bike seat so that your knee is slightly bent when your leg is extended downward. If your knee hurts when the pedal reaches the top, you can raise the seat so that your knee does not bend as much. 7. Start slowly. At first, try to do 5 to 10 minutes of cycling with little to no resistance. Then increase your time and the resistance bit by bit until you can do 20 to 30 minutes without pain. 8. If you start to have pain, rest your knee until your pain gets back to the level that is normal for you. Or cycle for less time or with less effort. Follow-up care is a key part of your treatment and safety. Be sure to make and go to all appointments, and call your doctor if you are having problems. It's also a good idea to know your test results and keep a list of the medicines you take. Where can you learn more? Go to https://[employer]ness.[employer]. org and sign in to your [employer] account. Enter C159 in the [employer] [place] box to learn more about \"Knee Arthritis: Exercises. \"     If you do not have an account, please click on the \"Sign Up Now\" link. Current as of: March 2, 2020               Content Version: 12.5  © 2006-2020 [employer]. Care instructions adapted under license by [facility] ([facility]). If you have questions about a medical condition or this instruction, always ask your healthcare professional. [place] [address] any warranty or liability for your use of this information.

## 2020-07-24 NOTE — PROGRESS NOTES
Subjective:        Jaycee Hernandez is here for follow-up after right knee arthroscopy. Findings at surgery: djd. Pain is controlled with current analgesics. Medication(s) being used: percocet. . She denies fever, wound drainage, increasing redness, pus, increasing pain, increasing swelling. Post op problems reported: none. She is ambulating antalgic. Objective:           General :    alert, appears stated age and cooperative   Gait:  Antalgic. Sutures:   Sutures in place and will be removed today. Incision:  healing well, no significant drainage, no dehiscence, no significant erythema   Tenderness:  mild   Flexion ROM:  full range of motion   Extension ROM:  full range of motion   Effusion:  mild   DVT Evaluation:  No evidence of DVT seen on physical exam.           Assessment:     Encounter Diagnosis   Name Primary?  Primary osteoarthritis of right knee Yes         Plan:      Surgical pictures from the surgery were reveiwed with the patient  HEP  Sutures removed today.   Follow up: prn

## 2020-07-28 ENCOUNTER — HOSPITAL ENCOUNTER (OUTPATIENT)
Age: 40
Discharge: HOME | End: 2020-07-28
Payer: COMMERCIAL

## 2020-07-28 LAB
ANION GAP SERPL CALCULATED.3IONS-SCNC: 13 MMOL/L (ref 7–16)
BACTERIA: ABNORMAL /HPF
BILIRUBIN URINE: NEGATIVE
BLOOD, URINE: NEGATIVE
BUN BLDV-MCNC: 16 MG/DL (ref 6–20)
CALCIUM SERPL-MCNC: 9.5 MG/DL (ref 8.6–10.2)
CHLORIDE BLD-SCNC: 106 MMOL/L (ref 98–107)
CLARITY: CLEAR
CO2: 22 MMOL/L (ref 22–29)
COLOR: YELLOW
CREAT SERPL-MCNC: 1.1 MG/DL (ref 0.5–1)
CREATININE URINE: 33 MG/DL (ref 29–226)
EPITHELIAL CELLS, UA: ABNORMAL /HPF
GFR AFRICAN AMERICAN: >60
GFR NON-AFRICAN AMERICAN: 55 ML/MIN/1.73
GLUCOSE BLD-MCNC: 96 MG/DL (ref 74–99)
GLUCOSE URINE: NEGATIVE MG/DL
HCT VFR BLD CALC: 42.8 % (ref 34–48)
HEMOGLOBIN: 13.2 G/DL (ref 11.5–15.5)
KETONES, URINE: NEGATIVE MG/DL
LEUKOCYTE ESTERASE, URINE: NEGATIVE
MAGNESIUM: 2.1 MG/DL (ref 1.6–2.6)
MCH RBC QN AUTO: 27.8 PG (ref 26–35)
MCHC RBC AUTO-ENTMCNC: 30.8 % (ref 32–34.5)
MCV RBC AUTO: 90.3 FL (ref 80–99.9)
MICROALBUMIN UR-MCNC: <12 MG/L
MICROALBUMIN/CREAT UR-RTO: ABNORMAL (ref 0–30)
NITRITE, URINE: NEGATIVE
PARATHYROID HORMONE INTACT: 24 PG/ML (ref 15–65)
PDW BLD-RTO: 14.5 FL (ref 11.5–15)
PH UA: 7.5 (ref 5–9)
PHOSPHORUS: 1.8 MG/DL (ref 2.5–4.5)
PLATELET # BLD: 237 E9/L (ref 130–450)
PMV BLD AUTO: 12.7 FL (ref 7–12)
POTASSIUM SERPL-SCNC: 4.4 MMOL/L (ref 3.5–5)
PROTEIN UA: NEGATIVE MG/DL
RBC # BLD: 4.74 E12/L (ref 3.5–5.5)
RBC UA: ABNORMAL /HPF (ref 0–2)
SODIUM BLD-SCNC: 141 MMOL/L (ref 132–146)
SPECIFIC GRAVITY UA: 1.02 (ref 1–1.03)
URIC ACID, SERUM: 4.3 MG/DL (ref 2.4–5.7)
UROBILINOGEN, URINE: 0.2 E.U./DL
VITAMIN D 25-HYDROXY: 47 NG/ML (ref 30–100)
WBC # BLD: 4.8 E9/L (ref 4.5–11.5)
WBC UA: ABNORMAL /HPF (ref 0–5)

## 2020-07-28 PROCEDURE — 80048 BASIC METABOLIC PNL TOTAL CA: CPT

## 2020-07-28 PROCEDURE — 83970 ASSAY OF PARATHORMONE: CPT

## 2020-07-28 PROCEDURE — 84550 ASSAY OF BLOOD/URIC ACID: CPT

## 2020-07-28 PROCEDURE — 36415 COLL VENOUS BLD VENIPUNCTURE: CPT

## 2020-07-28 PROCEDURE — 84100 ASSAY OF PHOSPHORUS: CPT

## 2020-07-28 PROCEDURE — 82044 UR ALBUMIN SEMIQUANTITATIVE: CPT

## 2020-07-28 PROCEDURE — 83735 ASSAY OF MAGNESIUM: CPT

## 2020-07-28 PROCEDURE — 81001 URINALYSIS AUTO W/SCOPE: CPT

## 2020-07-28 PROCEDURE — 82306 VITAMIN D 25 HYDROXY: CPT

## 2020-07-28 PROCEDURE — 85027 COMPLETE CBC AUTOMATED: CPT

## 2020-07-28 PROCEDURE — 82570 ASSAY OF URINE CREATININE: CPT

## 2020-08-03 ENCOUNTER — HOSPITAL ENCOUNTER (OUTPATIENT)
Age: 40
Discharge: HOME | End: 2020-08-03
Payer: COMMERCIAL

## 2020-09-01 ENCOUNTER — TELEPHONE (OUTPATIENT)
Dept: ORTHOPEDIC SURGERY | Age: 40
End: 2020-09-01

## 2020-09-12 ENCOUNTER — HOSPITAL ENCOUNTER (OUTPATIENT)
Age: 40
Discharge: HOME | End: 2020-09-12
Payer: COMMERCIAL

## 2020-09-12 LAB
PHOSPHORUS: 3 MG/DL (ref 2.5–4.5)
T4 FREE: 1.26 NG/DL (ref 0.93–1.7)
TSH SERPL DL<=0.05 MIU/L-ACNC: 1.02 UIU/ML (ref 0.27–4.2)

## 2020-09-12 PROCEDURE — 84439 ASSAY OF FREE THYROXINE: CPT

## 2020-09-12 PROCEDURE — 84443 ASSAY THYROID STIM HORMONE: CPT

## 2020-09-12 PROCEDURE — 84100 ASSAY OF PHOSPHORUS: CPT

## 2020-09-12 PROCEDURE — 36415 COLL VENOUS BLD VENIPUNCTURE: CPT

## 2020-09-16 ENCOUNTER — OFFICE VISIT (OUTPATIENT)
Dept: ORTHOPEDIC SURGERY | Age: 40
End: 2020-09-16

## 2020-09-16 VITALS — BODY MASS INDEX: 42.52 KG/M2 | HEIGHT: 63 IN | WEIGHT: 240 LBS

## 2020-09-16 PROCEDURE — 99024 POSTOP FOLLOW-UP VISIT: CPT | Performed by: NURSE PRACTITIONER

## 2020-09-16 RX ORDER — SPIRONOLACTONE 25 MG/1
12.5 TABLET ORAL 2 TIMES DAILY
COMMUNITY
End: 2022-01-27

## 2020-09-16 NOTE — PROGRESS NOTES
Subjective:        Margarita Toro is here for follow-up after right knee arthroscopy. Findings at surgery: djd. Patient reports increased pain over the last 2 weeks, no injury. Objective:           General :    alert, appears stated age and cooperative   Gait:  Antalgic. Sutures:   Sutures out   Incision:  healing well, no significant drainage, no dehiscence, no significant erythema   Tenderness:  mild   Flexion ROM:  full range of motion   Extension ROM:  full range of motion   Effusion:  mild   DVT Evaluation:  No evidence of DVT seen on physical exam.           Assessment:     Encounter Diagnosis   Name Primary?  Primary osteoarthritis of right knee Yes         Plan:     I had a lengthy discussion with the patient regarding their diagnosis. I explained treatment options including surgical vs non surgical treatment. I reviewed in detail the risks and benefits and outlined the procedure in detail with expected outcomes and possible complications. I also discussed non surgical treatment such as injections (CSI and visco supplementation), physical therapy, topical creams and NSAID's. They have elected for conservative management at this time. Brace   The patient has failed conservative measures such as NSAIDS, HEP, and cortisone injections. She is an excellent candidate for viscous supplementation injections including supartz in the Right knee.    We will contact the patient's insurance company and see them back in the office once we have received approval.

## 2020-11-07 ENCOUNTER — HOSPITAL ENCOUNTER (EMERGENCY)
Age: 40
Discharge: HOME | End: 2020-11-07
Payer: COMMERCIAL

## 2020-11-07 ENCOUNTER — APPOINTMENT (OUTPATIENT)
Dept: CT IMAGING | Age: 40
End: 2020-11-07
Payer: COMMERCIAL

## 2020-11-07 VITALS
RESPIRATION RATE: 16 BRPM | OXYGEN SATURATION: 100 % | DIASTOLIC BLOOD PRESSURE: 76 MMHG | HEART RATE: 75 BPM | SYSTOLIC BLOOD PRESSURE: 118 MMHG | HEIGHT: 63 IN | WEIGHT: 235 LBS | TEMPERATURE: 97.9 F | BODY MASS INDEX: 41.64 KG/M2

## 2020-11-07 LAB
ALBUMIN SERPL-MCNC: 3.8 G/DL (ref 3.5–5.2)
ALP BLD-CCNC: 87 U/L (ref 35–104)
ALT SERPL-CCNC: 17 U/L (ref 0–32)
ANION GAP SERPL CALCULATED.3IONS-SCNC: 7 MMOL/L (ref 7–16)
AST SERPL-CCNC: 20 U/L (ref 0–31)
BACTERIA: NORMAL /HPF
BASOPHILS ABSOLUTE: 0.04 E9/L (ref 0–0.2)
BASOPHILS RELATIVE PERCENT: 0.5 % (ref 0–2)
BILIRUB SERPL-MCNC: <0.2 MG/DL (ref 0–1.2)
BILIRUBIN URINE: NEGATIVE
BLOOD, URINE: NEGATIVE
BUN BLDV-MCNC: 19 MG/DL (ref 6–20)
CALCIUM SERPL-MCNC: 9.3 MG/DL (ref 8.6–10.2)
CHLORIDE BLD-SCNC: 103 MMOL/L (ref 98–107)
CLARITY: ABNORMAL
CO2: 27 MMOL/L (ref 22–29)
COLOR: YELLOW
CREAT SERPL-MCNC: 1.2 MG/DL (ref 0.5–1)
EOSINOPHILS ABSOLUTE: 0.2 E9/L (ref 0.05–0.5)
EOSINOPHILS RELATIVE PERCENT: 2.6 % (ref 0–6)
GFR AFRICAN AMERICAN: >60
GFR NON-AFRICAN AMERICAN: 50 ML/MIN/1.73
GLUCOSE BLD-MCNC: 121 MG/DL (ref 74–99)
GLUCOSE URINE: NEGATIVE MG/DL
HCG, URINE, POC: NEGATIVE
HCT VFR BLD CALC: 41.1 % (ref 34–48)
HEMOGLOBIN: 12.5 G/DL (ref 11.5–15.5)
IMMATURE GRANULOCYTES #: 0.03 E9/L
IMMATURE GRANULOCYTES %: 0.4 % (ref 0–5)
KETONES, URINE: ABNORMAL MG/DL
LACTIC ACID: 0.8 MMOL/L (ref 0.5–2.2)
LEUKOCYTE ESTERASE, URINE: NEGATIVE
LIPASE: 48 U/L (ref 13–60)
LYMPHOCYTES ABSOLUTE: 2.14 E9/L (ref 1.5–4)
LYMPHOCYTES RELATIVE PERCENT: 28 % (ref 20–42)
Lab: NORMAL
MCH RBC QN AUTO: 28.4 PG (ref 26–35)
MCHC RBC AUTO-ENTMCNC: 30.4 % (ref 32–34.5)
MCV RBC AUTO: 93.4 FL (ref 80–99.9)
MONOCYTES ABSOLUTE: 0.45 E9/L (ref 0.1–0.95)
MONOCYTES RELATIVE PERCENT: 5.9 % (ref 2–12)
NEGATIVE QC PASS/FAIL: NORMAL
NEUTROPHILS ABSOLUTE: 4.78 E9/L (ref 1.8–7.3)
NEUTROPHILS RELATIVE PERCENT: 62.6 % (ref 43–80)
NITRITE, URINE: NEGATIVE
PDW BLD-RTO: 14.2 FL (ref 11.5–15)
PH UA: 5 (ref 5–9)
PLATELET # BLD: 246 E9/L (ref 130–450)
PMV BLD AUTO: 11.7 FL (ref 7–12)
POSITIVE QC PASS/FAIL: NORMAL
POTASSIUM SERPL-SCNC: 4.3 MMOL/L (ref 3.5–5)
PROTEIN UA: NEGATIVE MG/DL
RBC # BLD: 4.4 E12/L (ref 3.5–5.5)
RBC UA: NORMAL /HPF (ref 0–2)
SODIUM BLD-SCNC: 137 MMOL/L (ref 132–146)
SPECIFIC GRAVITY UA: >=1.03 (ref 1–1.03)
TOTAL PROTEIN: 6.9 G/DL (ref 6.4–8.3)
UROBILINOGEN, URINE: 0.2 E.U./DL
WBC # BLD: 7.6 E9/L (ref 4.5–11.5)
WBC UA: NORMAL /HPF (ref 0–5)

## 2020-11-07 PROCEDURE — 96374 THER/PROPH/DIAG INJ IV PUSH: CPT

## 2020-11-07 PROCEDURE — 6360000004 HC RX CONTRAST MEDICATION: Performed by: RADIOLOGY

## 2020-11-07 PROCEDURE — 96375 TX/PRO/DX INJ NEW DRUG ADDON: CPT

## 2020-11-07 PROCEDURE — 83690 ASSAY OF LIPASE: CPT

## 2020-11-07 PROCEDURE — 85025 COMPLETE CBC W/AUTO DIFF WBC: CPT

## 2020-11-07 PROCEDURE — 80053 COMPREHEN METABOLIC PANEL: CPT

## 2020-11-07 PROCEDURE — 99284 EMERGENCY DEPT VISIT MOD MDM: CPT

## 2020-11-07 PROCEDURE — 83605 ASSAY OF LACTIC ACID: CPT

## 2020-11-07 PROCEDURE — 74177 CT ABD & PELVIS W/CONTRAST: CPT

## 2020-11-07 PROCEDURE — 6360000002 HC RX W HCPCS: Performed by: PHYSICIAN ASSISTANT

## 2020-11-07 PROCEDURE — 81001 URINALYSIS AUTO W/SCOPE: CPT

## 2020-11-07 RX ORDER — POLYETHYLENE GLYCOL 3350 17 G/17G
17 POWDER, FOR SOLUTION ORAL DAILY PRN
Qty: 238 G | Refills: 0 | Status: SHIPPED | OUTPATIENT
Start: 2020-11-07 | End: 2020-11-21

## 2020-11-07 RX ORDER — ONDANSETRON 2 MG/ML
4 INJECTION INTRAMUSCULAR; INTRAVENOUS ONCE
Status: COMPLETED | OUTPATIENT
Start: 2020-11-07 | End: 2020-11-07

## 2020-11-07 RX ORDER — FENTANYL CITRATE 50 UG/ML
50 INJECTION, SOLUTION INTRAMUSCULAR; INTRAVENOUS ONCE
Status: COMPLETED | OUTPATIENT
Start: 2020-11-07 | End: 2020-11-07

## 2020-11-07 RX ADMIN — FENTANYL CITRATE 50 MCG: 50 INJECTION, SOLUTION INTRAMUSCULAR; INTRAVENOUS at 21:24

## 2020-11-07 RX ADMIN — ONDANSETRON 4 MG: 2 INJECTION INTRAMUSCULAR; INTRAVENOUS at 21:22

## 2020-11-07 RX ADMIN — IOPAMIDOL 80 ML: 755 INJECTION, SOLUTION INTRAVENOUS at 22:32

## 2020-11-07 ASSESSMENT — PAIN SCALES - GENERAL
PAINLEVEL_OUTOF10: 4
PAINLEVEL_OUTOF10: 7

## 2020-11-08 NOTE — ED PROVIDER NOTES
[facility]    Department of Emergency Medicine   ED  Provider Note  Admit Date/RoomTime: 11/7/2020  8:42 PM  ED Room: 24/24  MRN: [id number]  Chief Complaint       Abdominal Pain (Pt presents today for abd pain that started last night. Pt states that she feels like her insides are coming out. Pt is concerned for ovarian cyst. )    History of Present Illness   Source of history provided by:  patient. History/Exam Limitations: none. Tom Gamez is a 36 y.o. old female who presents to the emergency department by private vehicle, for complaints of gradual onset cramping pain in the lower abdomen without radiation which began 1 day(s) prior to arrival.   There has been no similar episodes in the past.  Since onset the symptoms have been persistent. Patient states that she has had some nausea but no vomiting. No diarrhea. She states she had a bowel movement this morning that was normal for her. She denies any black tarry stools or bright red blood in the stool. Patient states that on 10/22/2020 she had a very light menstrual cycle and it only lasted 2 days which is abnormal for her. She denies any abnormal vaginal discharge. No burning with urination, frequency urgency. Nothing seemed to make her symptoms better or worse. ROS   Pertinent positives and negatives are stated within HPI, all other systems reviewed and are negative. has a past medical history of Arthritis, Asthma, Back pain, Cervical disc disease, Depression, Diabetes mellitus ([address] Ectopic pregnancy, Fibromyalgia, GERD (gastroesophageal reflux disease), Heart murmur, Herpes genitalia, History of blood transfusion, Hyperlipidemia, Hypertension, Migraines, Ovarian cyst, PONV (postoperative nausea and vomiting), Renal insufficiency, and Thyroid disease. has a past surgical history that includes Dilation and curettage of uterus; Tubal ligation;  Tonsillectomy; ECHO Compl W Dop Color Flow (8/24/2013); other surgical history; cervical fusion (07/30/2014); Knee arthroscopy (Right, 2014); Knee arthroscopy (Right, 04 01 2016); Nerve Block (Bilateral, 12/06/2016); Knee arthroscopy (Left, 12/16/2016); shoulder surgery (Left, 04/12/2018); pr shoulder scope bone shaving (Left, 4/12/2018); Cervical spine surgery; Shoulder arthroscopy (Right, 01/03/2020); Shoulder arthroscopy (Right, 1/3/2020); Knee arthroscopy (Right, 07/10/2020); and Knee arthroscopy (Right, 7/10/2020). Social History:  reports that she has never smoked. She has never used smokeless tobacco. She reports that she does not drink alcohol or use drugs. Family History: family history includes Arthritis in an other family member; Diabetes in her mother; Hypertension in her mother and another family member; Mental Illness in an other family member; Migraines in her mother; No Known Problems in her father; Ovarian Cancer in her sister. Allergies: Acetaminophen; Aspirin; Folic acid; Gabapentin; Mobic [meloxicam]; Propoxyphene; Tape [adhesive tape]; Theophylline; Cortisone; and Darvocet a500 [propoxyphene n-acetaminophen]    Physical Exam           ED Triage Vitals [11/07/20 1813]   BP Temp Temp Source Pulse Resp SpO2 Height Weight   (!) 137/90 97.3 °F (36.3 °C) Infrared 96 18 99 % 5' 3\" (1.6 m) 235 lb (106.6 kg)      Oxygen Saturation Interpretation: Normal.    · General Appearance/Constitutional:  Alert, development consistent with age. · HEENT:  NC/NT. PERRLA. Airway patent. · Neck:  Supple. No lymphadenopathy. · Respiratory:  No retractions. Lungs Clear to auscultation and breath sounds equal.  · CV:  Regular rate and rhythm. · GI:  General Appearance: normal.         Bowel sounds: normal bowel sounds. Distension:  None. Tenderness: moderate tenderness is present in the RLQ, no rebound tenderness, no guarding. Liver: non-tender. Spleen:  non-tender. Pulsatile Mass: absent.            Hernia:  no inguinal or femoral hernias noted. · Back: CVA Tenderness: None b/l. · Integument:  Normal turgor. Warm, dry, without visible rash, unless noted elsewhere. · Lymphatics: No edema, cap.refill <3sec. · Neurological:  Orientation age-appropriate. Motor functions intact.     Lab / Imaging Results   (All laboratory and radiology results have been personally reviewed by myself)  Labs:  Results for orders placed or performed during the hospital encounter of 11/07/20   Urinalysis   Result Value Ref Range    Color, UA Yellow Straw/Yellow    Clarity, UA SLCLOUDY Clear    Glucose, Ur Negative Negative mg/dL    Bilirubin Urine Negative Negative    Ketones, Urine TRACE (A) Negative mg/dL    Specific Gravity, UA >=1.030 1.005 - 1.030    Blood, Urine Negative Negative    pH, UA 5.0 5.0 - 9.0    Protein, UA Negative Negative mg/dL    Urobilinogen, Urine 0.2 <2.0 E.U./dL    Nitrite, Urine Negative Negative    Leukocyte Esterase, Urine Negative Negative   CBC Auto Differential   Result Value Ref Range    WBC 7.6 4.5 - 11.5 E9/L    RBC 4.40 3.50 - 5.50 E12/L    Hemoglobin 12.5 11.5 - 15.5 g/dL    Hematocrit 41.1 34.0 - 48.0 %    MCV 93.4 80.0 - 99.9 fL    MCH 28.4 26.0 - 35.0 pg    MCHC 30.4 (L) 32.0 - 34.5 %    RDW 14.2 11.5 - 15.0 fL    Platelets 588 866 - 089 E9/L    MPV 11.7 7.0 - 12.0 fL    Neutrophils % 62.6 43.0 - 80.0 %    Immature Granulocytes % 0.4 0.0 - 5.0 %    Lymphocytes % 28.0 20.0 - 42.0 %    Monocytes % 5.9 2.0 - 12.0 %    Eosinophils % 2.6 0.0 - 6.0 %    Basophils % 0.5 0.0 - 2.0 %    Neutrophils Absolute 4.78 1.80 - 7.30 E9/L    Immature Granulocytes # 0.03 E9/L    Lymphocytes Absolute 2.14 1.50 - 4.00 E9/L    Monocytes Absolute 0.45 0.10 - 0.95 E9/L    Eosinophils Absolute 0.20 0.05 - 0.50 E9/L    Basophils Absolute 0.04 0.00 - 0.20 E9/L   Comprehensive Metabolic Panel   Result Value Ref Range    Sodium 137 132 - 146 mmol/L    Potassium 4.3 3.5 - 5.0 mmol/L    Chloride 103 98 - 107 mmol/L    CO2 27 22 - 29 mmol/L Anion Gap 7 7 - 16 mmol/L    Glucose 121 (H) 74 - 99 mg/dL    BUN 19 6 - 20 mg/dL    CREATININE 1.2 (H) 0.5 - 1.0 mg/dL    GFR Non-African American 50 >=60 mL/min/1.73    GFR African American >60     Calcium 9.3 8.6 - 10.2 mg/dL    Total Protein 6.9 6.4 - 8.3 g/dL    Alb 3.8 3.5 - 5.2 g/dL    Total Bilirubin <0.2 0.0 - 1.2 mg/dL    Alkaline Phosphatase 87 35 - 104 U/L    ALT 17 0 - 32 U/L    AST 20 0 - 31 U/L   Lactic Acid, Plasma   Result Value Ref Range    Lactic Acid 0.8 0.5 - 2.2 mmol/L   Lipase   Result Value Ref Range    Lipase 48 13 - 60 U/L   Microscopic Urinalysis   Result Value Ref Range    WBC, UA 1-3 0 - 5 /HPF    RBC, UA NONE 0 - 2 /HPF    Bacteria, UA NONE SEEN None Seen /HPF   POC Pregnancy Urine Qual   Result Value Ref Range    HCG, Urine, POC Negative Negative    Lot Number [id number]     Positive QC Pass/Fail Pass     Negative QC Pass/Fail Pass      Imaging: All Radiology results interpreted by Radiologist unless otherwise noted. CT ABDOMEN PELVIS W IV CONTRAST Additional Contrast? None   Final Result   1. No CT evidence of an acute intra-abdominal or intrapelvic process. 2.  No findings to suggest acute appendicitis; no ureter calculus or   hydronephrosis. 3. Indeterminate hyperdense hepatic dome lesion very likely to reflect benign   process such as FNH or atypical hemangioma. This could be followed with MRI   abdomen attention liver without and with gadolinium in 6 months for   additional characterization and to establish stability.       RECOMMENDATIONS:   MRI abdomen attention liver without and with gadolinium in 6 months           ED Course / Medical Decision Making     Medications   fentaNYL (SUBLIMAZE) injection 50 mcg (50 mcg Intravenous Given 11/7/20 2124)   ondansetron (ZOFRAN) injection 4 mg (4 mg Intravenous Given 11/7/20 2122)   iopamidol (ISOVUE-370) 76 % injection 80 mL (80 mLs Intravenous Given 11/7/20 2232)        Re-examination:  11/7/20       Time: 2255    Patients symptoms are improving. Patient is resting comfortably, in no acute distress. Updated on results. Consults:   None    Procedures:   none    MDM:   Patient's labs, urinalysis, CT scan are sent to unremarkable. Patient is updated on likely hemangioma of the liver, advised to follow-up with her PCP for further evaluation and management and evaluation of this. Patient has no rigidity or surgical abdomen. Encouraged return to the ER for any worsening symptoms. Otherwise to follow-up with PCP    Counseling: The emergency provider has spoken with the patient and discussed todays results, in addition to providing specific details for the plan of care and counseling regarding the diagnosis and prognosis. Questions are answered at this time and they are agreeable with the plan . Assessment      1. Abdominal pain, unspecified abdominal location    2. Liver cyst      Plan   Discharge to home  Patient condition is good    New Medications     New Prescriptions    POLYETHYLENE GLYCOL (MIRALAX) 17 G PACKET    Take 17 g by mouth daily as needed for Constipation     Electronically signed by NALLELY Barron   DD: 11/7/20  **This report was transcribed using voice recognition software. Every effort was made to ensure accuracy; however, inadvertent computerized transcription errors may be present.   END OF ED PROVIDER NOTE       Peng Barron  11/07/20 [id number]

## 2020-12-22 ENCOUNTER — TELEPHONE (OUTPATIENT)
Dept: ORTHOPEDIC SURGERY | Age: 40
End: 2020-12-22

## 2021-02-11 ENCOUNTER — OFFICE VISIT (OUTPATIENT)
Dept: NEUROLOGY | Age: 41
End: 2021-02-11
Payer: COMMERCIAL

## 2021-02-11 VITALS
TEMPERATURE: 97.4 F | BODY MASS INDEX: 43.41 KG/M2 | SYSTOLIC BLOOD PRESSURE: 137 MMHG | WEIGHT: 245 LBS | HEIGHT: 63 IN | OXYGEN SATURATION: 94 % | HEART RATE: 72 BPM | DIASTOLIC BLOOD PRESSURE: 88 MMHG | RESPIRATION RATE: 20 BRPM

## 2021-02-11 DIAGNOSIS — G43.909 MIGRAINE WITHOUT STATUS MIGRAINOSUS, NOT INTRACTABLE, UNSPECIFIED MIGRAINE TYPE: Primary | ICD-10-CM

## 2021-02-11 PROCEDURE — 99204 OFFICE O/P NEW MOD 45 MIN: CPT | Performed by: CLINICAL NURSE SPECIALIST

## 2021-02-11 PROCEDURE — G8484 FLU IMMUNIZE NO ADMIN: HCPCS | Performed by: CLINICAL NURSE SPECIALIST

## 2021-02-11 PROCEDURE — 1036F TOBACCO NON-USER: CPT | Performed by: CLINICAL NURSE SPECIALIST

## 2021-02-11 PROCEDURE — G8427 DOCREV CUR MEDS BY ELIG CLIN: HCPCS | Performed by: CLINICAL NURSE SPECIALIST

## 2021-02-11 PROCEDURE — G8417 CALC BMI ABV UP PARAM F/U: HCPCS | Performed by: CLINICAL NURSE SPECIALIST

## 2021-02-11 RX ORDER — TOPIRAMATE 100 MG/1
TABLET, FILM COATED ORAL
COMMUNITY
Start: 2021-01-18 | End: 2021-05-21 | Stop reason: ALTCHOICE

## 2021-02-11 RX ORDER — SIMVASTATIN 5 MG
5 TABLET ORAL NIGHTLY
COMMUNITY
Start: 2021-01-28

## 2021-02-11 RX ORDER — RIZATRIPTAN BENZOATE 10 MG/1
TABLET, ORALLY DISINTEGRATING ORAL
COMMUNITY
Start: 2021-01-27 | End: 2021-02-19 | Stop reason: ALTCHOICE

## 2021-02-11 RX ORDER — ERGOCALCIFEROL 1.25 MG/1
CAPSULE ORAL WEEKLY
COMMUNITY
Start: 2021-01-28

## 2021-02-11 RX ORDER — SUCRALFATE 1 G/1
TABLET ORAL
COMMUNITY
Start: 2021-01-28 | End: 2022-01-27

## 2021-02-11 RX ORDER — LEVOTHYROXINE SODIUM 0.15 MG/1
150 TABLET ORAL DAILY
COMMUNITY
Start: 2021-01-15

## 2021-02-11 NOTE — PROGRESS NOTES
Lorna Woods is a 36 y.o. right handed woman    Past Medical History:     Past Medical History:   Diagnosis Date    Arthritis     Asthma     Back pain     Cervical disc disease     Depression     Diabetes mellitus ([address]     diet controlled    Ectopic pregnancy     Fibromyalgia     GERD (gastroesophageal reflux disease)     Heart murmur     benign    Herpes genitalia     History of blood transfusion 2003    Hyperlipidemia     Hypertension     Migraines     Ovarian cyst     PONV (postoperative nausea and vomiting)     with anesthesia    Renal insufficiency     very slight see's Dr Linh Rome    Thyroid disease        Past Surgical History:     Past Surgical History:   Procedure Laterality Date    CERVICAL FUSION  07/30/2014    anterior cervical discetomy fusion C6-C7    CERVICAL SPINE SURGERY      has a plate    DILATION AND CURETTAGE OF UTERUS      ECHO COMPL W DOP COLOR FLOW  8/24/2013         KNEE ARTHROSCOPY Right 2014    KNEE ARTHROSCOPY Right 04 01 2016    Arthroscopy right knee chondroplasty synovectomy medial and lateral menisectomy    KNEE ARTHROSCOPY Left 12/16/2016    left knee arthoscopy medial meniscectomy chodroplasty synovectomy    KNEE ARTHROSCOPY Right 07/10/2020    KNEE ARTHROSCOPY Right 7/10/2020    RIGHT KNEE ARTHROSCOPY, MEDIAL MENISCECTOMY AND DEBRIDEMENT performed by Staci Garcia DO at [facility] 12/06/2016    lumbar facet #1    OTHER SURGICAL HISTORY      partial rt  fallopian tube removed    HI SHOULDER SCOPE BONE SHAVING Left 4/12/2018    LEFT SHOULDER ARTHROSCOPY, SUBACROMIAL DECOMPRESSION AND DEBRIDEMENT performed by Staci Garcia DO at [address] ARTHROSCOPY Right 01/03/2020    SHOULDER ARTHROSCOPY Right 1/3/2020    RIGHT SHOULDER ARTHROSCOPY, SUBACROMIAL DECOMPRESSION AND DEBRIDEMENT LABRIUM performed by Staci Garcia DO at [address] Left 04/12/2018    TONSILLECTOMY  TUBAL LIGATION         Allergies:     Acetaminophen, Aspirin, Folic acid, Gabapentin, Mobic [meloxicam], Propoxyphene, Tape [adhesive tape], Theophylline, Cortisone, and Darvocet a500 [propoxyphene n-acetaminophen]    Medications:     Prior to Admission medications    Medication Sig Start Date End Date Taking? Authorizing Provider   rizatriptan (MAXALT-MLT) 10 MG disintegrating tablet  1/27/21  Yes Historical Provider, MD   metFORMIN (GLUCOPHAGE) 500 MG tablet  1/27/21  Yes Historical Provider, MD   levothyroxine (SYNTHROID) 150 MCG tablet  1/15/21  Yes Historical Provider, MD   vitamin D (ERGOCALCIFEROL) 1.25 MG (16073 UT) CAPS capsule  1/28/21  Yes Historical Provider, MD   simvastatin (ZOCOR) 5 MG tablet  1/28/21  Yes Historical Provider, MD   sucralfate (CARAFATE) 1 GM tablet  1/28/21  Yes Historical Provider, MD   topiramate (TOPAMAX) 100 MG tablet  1/18/21  Yes Historical Provider, MD   spironolactone (ALDACTONE) 25 MG tablet Take 25 mg by mouth daily   Yes Historical Provider, MD   Plecanatide (TRULANCE) 3 MG TABS Take by mouth daily   Yes Historical Provider, MD   atenolol (TENORMIN) 25 MG tablet Take 1 tablet by mouth daily  Patient taking differently: Take 25 mg by mouth daily Takes 1 1/2 tabs 6/17/20  Yes Asheesh A Kirstie Rubinstein, MD   FLUoxetine (PROZAC) 10 MG tablet Take 25 mg by mouth 2 times daily    Yes Historical Provider, MD   Cholecalciferol (VITAMIN D3) 50 MCG (2000 UT) CAPS Take by mouth daily   Yes Historical Provider, MD   oxyCODONE-acetaminophen (PERCOCET) 5-325 MG per tablet Take 1 tablet by mouth every 8 hours as needed for Pain.    Yes Historical Provider, MD   albuterol sulfate  (90 Base) MCG/ACT inhaler Inhale 1 puff into the lungs every 4 hours as needed  11/7/19  Yes Historical Provider, MD   Milnacipran HCl (SAVELLA PO) Take 50 mg by mouth 2 times daily    Yes Historical Provider, MD   diclofenac sodium 1 % GEL Apply 2 g topically 4 times daily as needed for Pain Using 4 followed with neurology since then and is here asking for more Botox    I explained that I do not do Botox but can get her to the provider who does    She has never been tried on any CGRP blocker   No abortive therapy - no maintenance therapy    Now with daily headaches which turn into migraines    Uses Maxalt MLT for abortive with little success     Here alone and a good historian    Marked polypharmacy -- now with \"pre\" diabetes and CKD    States she has multiple orthopaedic issues and has undergone multiple orthopaedic surgeries         Objective:   /88 (Site: Right Upper Arm, Position: Sitting, Cuff Size: Large Adult)   Pulse 72   Temp 97.4 °F (36.3 °C)   Resp 20   Ht 5' 3\" (1.6 m)   Wt 245 lb (111.1 kg)   SpO2 94%   BMI 43.40 kg/m²      General appearance: alert, appears stated age and cooperative  Head: Normocephalic, without obvious abnormality, atraumatic  Extremities: no cyanosis or edema  Pulses: 2+ and symmetric  Skin: no rashes or lesions     Mental Status: Alert, oriented to person place and year     Speech: clear  Language: appropriate     Cranial Nerves:  I: smell    II: visual acuity     II: visual fields Full    II: pupils GONZALO   III,VII: ptosis None   III,IV,VI: extraocular muscles  EOMI without nystagmus    V: mastication Normal   V: facial light touch sensation  Normal   V,VII: corneal reflex  Present   VII: facial muscle function - upper     VII: facial muscle function - lower Normal   VIII: hearing Normal   IX: soft palate elevation  Normal   IX,X: gag reflex    XI: trapezius strength  5/5   XI: sternocleidomastoid strength 5/5   XI: neck extension strength  5/5   XII: tongue strength  Normal     Motor:  5/5 throughout  Normal bulk and tone     Sensory:  LT normal  Vibration normal     Coordination:   FN, FFM and MIGUELANGEL normal    Gait:  Normal     DTR:   No reflexes    No Love's     Laboratory/Radiology:     CBC with Differential:    Lab Results   Component Value Date    WBC 7.6

## 2021-02-19 ENCOUNTER — OFFICE VISIT (OUTPATIENT)
Dept: NEUROLOGY | Age: 41
End: 2021-02-19
Payer: COMMERCIAL

## 2021-02-19 VITALS
TEMPERATURE: 97.3 F | BODY MASS INDEX: 43.41 KG/M2 | SYSTOLIC BLOOD PRESSURE: 134 MMHG | WEIGHT: 245 LBS | HEIGHT: 63 IN | DIASTOLIC BLOOD PRESSURE: 68 MMHG

## 2021-02-19 DIAGNOSIS — G43.709 CHRONIC MIGRAINE WITHOUT AURA WITHOUT STATUS MIGRAINOSUS, NOT INTRACTABLE: Primary | ICD-10-CM

## 2021-02-19 PROBLEM — M75.41 IMPINGEMENT SYNDROME OF RIGHT SHOULDER: Status: RESOLVED | Noted: 2020-01-03 | Resolved: 2021-02-19

## 2021-02-19 PROBLEM — R07.89 ATYPICAL CHEST PAIN: Status: RESOLVED | Noted: 2018-08-30 | Resolved: 2021-02-19

## 2021-02-19 PROBLEM — S43.431A TEAR OF RIGHT GLENOID LABRUM: Status: RESOLVED | Noted: 2020-01-03 | Resolved: 2021-02-19

## 2021-02-19 PROBLEM — J45.909 REACTIVE AIRWAY DISEASE WITHOUT COMPLICATION: Status: RESOLVED | Noted: 2018-08-30 | Resolved: 2021-02-19

## 2021-02-19 PROBLEM — M75.42 IMPINGEMENT SYNDROME OF LEFT SHOULDER: Status: RESOLVED | Noted: 2018-04-12 | Resolved: 2021-02-19

## 2021-02-19 PROCEDURE — 1036F TOBACCO NON-USER: CPT | Performed by: NURSE PRACTITIONER

## 2021-02-19 PROCEDURE — 99214 OFFICE O/P EST MOD 30 MIN: CPT | Performed by: NURSE PRACTITIONER

## 2021-02-19 PROCEDURE — G8427 DOCREV CUR MEDS BY ELIG CLIN: HCPCS | Performed by: NURSE PRACTITIONER

## 2021-02-19 PROCEDURE — G8417 CALC BMI ABV UP PARAM F/U: HCPCS | Performed by: NURSE PRACTITIONER

## 2021-02-19 PROCEDURE — G8484 FLU IMMUNIZE NO ADMIN: HCPCS | Performed by: NURSE PRACTITIONER

## 2021-02-19 RX ORDER — FREMANEZUMAB-VFRM 225 MG/1.5ML
225 INJECTION SUBCUTANEOUS
Qty: 2 PEN | Refills: 1 | Status: SHIPPED
Start: 2021-02-19 | End: 2021-05-21 | Stop reason: ALTCHOICE

## 2021-02-19 RX ORDER — UBROGEPANT 100 MG/1
100 TABLET ORAL DAILY PRN
Qty: 4 TABLET | Refills: 0 | COMMUNITY
Start: 2021-02-19 | End: 2021-02-23

## 2021-02-19 RX ORDER — FREMANEZUMAB-VFRM 225 MG/1.5ML
225 INJECTION SUBCUTANEOUS
Qty: 1 PEN | Refills: 0 | COMMUNITY
Start: 2021-02-19 | End: 2021-04-02

## 2021-02-19 RX ORDER — UBROGEPANT 100 MG/1
100 TABLET ORAL DAILY PRN
Qty: 6 TABLET | Refills: 3 | Status: SHIPPED
Start: 2021-02-19 | End: 2021-02-23

## 2021-02-19 NOTE — PATIENT INSTRUCTIONS
Patient Education        fremanezumab  Pronunciation:  FREE ma TONO ue mab  Brand:  Papa  What is the most important information I should know about fremanezumab? Follow all directions on your medicine label and package. Tell each of your healthcare providers about all your medical conditions, allergies, and all medicines you use. What is fremanezumab? Inell Eastern is used to prevent migraine headaches in adults. Inell Eastern may also be used for purposes not listed in this medication guide. What should I discuss with my healthcare provider before using fremanezumab? You should not use fremanezumab if you are allergic to it. Inell Eastern is not approved for use by anyone younger than 25years old. Tell your doctor if you are pregnant. It is not known whether this medicine will harm an unborn baby. However, having migraines during pregnancy may cause complications such as preeclampsia (which can lead to medical problems in both mother and baby). The benefit of preventing migraines may outweigh any risks to the baby. It may not be safe to breast-feed while using this medicine. Ask your doctor about any risk. How should I use fremanezumab? Follow all directions on your prescription label and read all medication guides or instruction sheets. Use the medicine exactly as directed. Inell Eastern is injected under the skin. A healthcare provider may teach you how to properly use the medication by yourself. Inell Eastern is usually given as 1 injection once a month, or as 3 injections (in separate syringes) once every 3 months. Read and carefully follow any Instructions for Use provided with your medicine. Do not use fremanezumab if you don't understand all instructions for proper use. Ask your doctor or pharmacist if you have questions. Prepare your injection only when you are ready to give it. Do not use if the medicine looks cloudy, has changed colors, or has particles in it.  Call your pharmacist for new medicine. Store fremanezumab in the original carton in a refrigerator, protected from light. Do not freeze or shake this medicine. Take the medicine out of the refrigerator and let it reach room temperature for 30 minutes before injecting your dose. Do not warm the medicine with hot water, sunlight, or a microwave. You may store fremanezumab for up to 24 hours at room temperature. Throw the medicine away if it has been at room temperature for 24 hours or longer. Each single-use prefilled syringe is for one use only. Throw it away after one use, even if there is still medicine left inside. Use a needle and syringe only once and then place them in a puncture-proof \"sharps\" container. Follow state or local laws about how to dispose of this container. Keep it out of the reach of children and pets. What happens if I miss a dose? Use the medicine as soon as you remember, and then restart your regular injection schedule 1 month or 3 months later. What happens if I overdose? Seek emergency medical attention or call the Poison Help line at 1-579.942.3847. What should I avoid while using fremanezumab? Follow your doctor's instructions about any restrictions on food, beverages, or activity. What are the possible side effects of fremanezumab? Get emergency medical help if you have signs of an allergic reaction: hives; difficult breathing; swelling of your face, lips, tongue, or throat. An allergic reaction to fremanezumab can occur up to 1 month after an injection. Common side effects may include:  · pain, redness, or a hard lump where the medicine was injected. This is not a complete list of side effects and others may occur. Call your doctor for medical advice about side effects. You may report side effects to FDA at 0-267-OSH-3194. What other drugs will affect fremanezumab? Other drugs may affect fremanezumab, including prescription and over-the-counter medicines, vitamins, and herbal products.  Tell your doctor about all your current medicines and any medicine you start or stop using. Where can I get more information? Your pharmacist can provide more information about fremanezumab. Remember, keep this and all other medicines out of the reach of children, never share your medicines with others, and use this medication only for the indication prescribed. Every effort has been made to ensure that the information provided by [address] is accurate, up-to-date, and complete, but no guarantee is made to that effect. Drug information contained herein may be time sensitive. [facility] information has been compiled for use by healthcare practitioners and consumers in the [place] and therefore [employer] does not warrant that uses outside of the [place] are appropriate, unless specifically indicated otherwise. [facility]'s drug information does not endorse drugs, diagnose patients or recommend therapy. [facility]'s drug information is an informational resource designed to assist licensed healthcare practitioners in caring for their patients and/or to serve consumers viewing this service as a supplement to, and not a substitute for, the expertise, skill, knowledge and judgment of healthcare practitioners. The absence of a warning for a given drug or drug combination in no way should be construed to indicate that the drug or drug combination is safe, effective or appropriate for any given patient. [facility] does not assume any responsibility for any aspect of healthcare administered with the aid of information [facility] provides. The information contained herein is not intended to cover all possible uses, directions, precautions, warnings, drug interactions, allergic reactions, or adverse effects. If you have questions about the drugs you are taking, check with your doctor, nurse or pharmacist.  Copyright 4959-6252 [address]. Version: 1.01. Revision date: 10/3/2018.   Care instructions adapted under license by [facility] ([facility]). If you have questions about a medical condition or this instruction, always ask your healthcare professional. [address] any warranty or liability for your use of this information. Patient Education        ubrogepant  Pronunciation:  ue BROHERACLIO foster mely  Brand:  [employer]  What is the most important information I should know about ubrogepant? Tell your doctor about all your current medicines and any you start or stop using. Many drugs can interact, and some drugs should not be used together. What is ubrogepant? Ann Peyer is used in adults to treat migraine headaches with or without aura. Ann Peyer will not prevent a migraine headache. Ann Peyer may also be used for purposes not listed in this medication guide. What should I discuss with my healthcare provider before taking ubrogepant? Many drugs can interact and cause dangerous effects. Some drugs should not be used together with ubrogepant. Your doctor may change your treatment plan if you also use:  · nefazodone;  · an antibiotic --clarithromycin, telithromycin;  · antifungal medicine --itraconazole, ketoconazole; or  · antiviral medicine to treat HIV/AIDS --indinavir, nelfinavir, ritonavir, saquinavir. Tell your doctor if you have ever had:  · liver disease; or  · kidney disease. Tell your doctor if you are pregnant or plan to become pregnant. It is not known whether ubrogepant will harm an unborn baby. However, having migraine headaches during pregnancy may cause complications such as diabetes or eclampsia (dangerously high blood pressure that can lead to medical problems in both mother and baby). The benefit of treating migraines may outweigh any risks to the baby. It may not be safe to breastfeed while using this medicine. Ask your doctor about any risk. Ann Peyer is not approved for use by anyone younger than 25years old. How should I take ubrogepant?   Follow all directions on your prescription label and read all medication guides or instruction sheets. Use the medicine exactly as directed. You may take ubrogepant with or without food. Avoid grapefruit or grapefruit juice. After taking ubrogepant:  If your headache does not completely go away, or goes away and comes back, you may take a second tablet if it has been at least 2 hours since your first dose. If your symptoms have not improved, contact your doctor before taking any more tablets. You should not take a second tablet within 24 hours if you have consumed a grapefruit product, or if you also take any of the following medications:   · ciprofloxacin;  · cyclosporine;  · fluconazole;  · fluvoxamine; or  · verapamil. Call your doctor if you have more than 8 headaches in one month (30 days). Tell your doctor if this medicine seems to stop working as well in treating your migraine attacks. Store at room temperature away from moisture and heat. What happens if I miss a dose? Since ubrogepant is used when needed, it does not have a daily dosing schedule. Do not take more than 200 milligrams in a 24-hour period. Do not use ubrogepant to treat more than 8 headaches per month. What happens if I overdose? Seek emergency medical attention or call the Poison Help line at 1-703.760.9559. What should I avoid while taking ubrogepant? Grapefruit may interact with ubrogepant and lead to unwanted side effects. You should not take a second ubrogepant tablet within 24 hours after consuming grapefruit or grapefruit juice. What are the possible side effects of ubrogepant? Get emergency medical help if you have signs of an allergic reaction: hives; difficult breathing; swelling of your face, lips, tongue, or throat. Common side effects may include:  · nausea; or  · drowsiness. This is not a complete list of side effects and others may occur. Call your doctor for medical advice about side effects. You may report side effects to FDA at 1-435-FDA-8846.   What other drugs will affect ubrogepant? Sometimes it is not safe to use certain medications at the same time. Some drugs can affect your blood levels of other drugs you take, which may increase side effects or make the medications less effective. Tell your doctor about all your current medicines. Many drugs can affect ubrogepant, especially:  · curcumin (also called turmeric);  · cyclosporine;  · eltrombopag;  · fluconazole;  · fluvoxamine;  · phenytoin;  · [place]'s wort;  · an antibiotic --ciprofloxacin, rifampin;  · a barbiturate --butabarbital, phenobarbital, secobarbital; or  · heart or blood pressure medicine --carvedilol, quinidine, verapamil. This list is not complete and many other drugs may affect ubrogepant. This includes prescription and over-the-counter medicines, vitamins, and herbal products. Not all possible drug interactions are listed here. Where can I get more information? Your pharmacist can provide more information about ubrogepant. Remember, keep this and all other medicines out of the reach of children, never share your medicines with others, and use this medication only for the indication prescribed. Every effort has been made to ensure that the information provided by [address] is accurate, up-to-date, and complete, but no guarantee is made to that effect. Drug information contained herein may be time sensitive. [facility] information has been compiled for use by healthcare practitioners and consumers in the [place] and therefore [employer] does not warrant that uses outside of the [place] are appropriate, unless specifically indicated otherwise. [facility] drug information does not endorse drugs, diagnose patients or recommend therapy.  [facility] drug information is an informational resource designed to assist licensed healthcare practitioners in caring for their patients and/or to serve consumers viewing this service as a supplement to, and not a substitute for, the expertise, skill, knowledge and judgment of healthcare practitioners. The absence of a warning for a given drug or drug combination in no way should be construed to indicate that the drug or drug combination is safe, effective or appropriate for any given patient. [facility] does not assume any responsibility for any aspect of healthcare administered with the aid of information [facility] provides. The information contained herein is not intended to cover all possible uses, directions, precautions, warnings, drug interactions, allergic reactions, or adverse effects. If you have questions about the drugs you are taking, check with your doctor, nurse or pharmacist.  Copyright 2944-4268 [address]. Version: 1.01. Revision date: 2/18/2020. Care instructions adapted under license by [facility] ([facility]). If you have questions about a medical condition or this instruction, always ask your healthcare professional. [place] [address] any warranty or liability for your use of this information.

## 2021-02-19 NOTE — PROGRESS NOTES
Anthony. Steven Springer M.D., F.A.C.P. Scot Lopez, DNP, APRN, CNS  Danae Giovanna. Mike Mejia, MSN, APRN-FNP-C  Macarthur Ganser MSN, APRN, FNP-C  Maribel LOPEZ, PA-C  [address] MSN, APRN, FNP-C     [address] [phone]                                  Shannan Iqbal is a 36 y.o. right handed woman    She was referred by Liz Nash DNP-APRN to discuss Botox    She presents alone and is good historian. Description of Headaches:  Location of pain: bilateral, frontal  Radiation of pain?:temporal, parietal; holocephalic  Character of pain:pulsating and like a bomb going off; jackhammer  Severity of pain: 7-10  Accompanying symptoms: nausea, sonophobia, photophobia; vertigo  Aura: none  Prodromal sx?: none  Rapidity of onset: gradual  Typical duration of individual headache: 24 hours  Are most headaches similar in presentation? yes  Aggravating factors: activity  Typical precipitants: stress    Temporal Pattern of Headaches:  Started having HA's at age 15  Worst time of day: varies  Awaken from sleep?: yes   Seasonal pattern?: no  Clustering of HA's over time? no  Overall pattern since problem began: gradually worsening    Degree of Functional Impairment: moderate and severe    Current Use of Meds to Treat HA:  Abortive meds? Maxalt, Vicky Delonte  Daily use? no  Prophylactic meds? antidepressants (amitriptyline, fluoxetine); topiramate, atenolol    Additional Relevant History:  History of head/neck trauma? no  History of head/neck surgery? Yes--cervical fusion  Family h/o headache problems? yes - mother  Use of meds that might worsen HA's (nitrates, exogenous estrogens,    Nifedipine)? no  Exposure to carbon monoxide? no  Substance use: none    She suffers from about 4 migraine HA days per week which are not aborted by rizatriptan.  She also failed Imitrex in the past. Mallie Mater was given by Mariluz Andres, and was effective, but she ran out of the samples. She has been on topiramate 100 mg for years, with no effect on her headaches. She is on amitriptyline for sleep and Prozac for depression, but they are not helpful for her migraines. Already on a BB. Also failed gabapentin. She had Botox treatments over a year ago and they were helpful, so she is interested in possibly restarting. However, she has never tried injectable CGRP receptor antagonists. She drinks mostly water, has 1/2 cup coffee daily, does not exercise, and eats regularly. Sleep varies but she gets about 6 hours per night. She suffers from chronic pain related to \"severe fibromyalgia\" as well as multiple orthopedic issues and numerous surgeries. On narcotics as well.     No chest pain or palpitations  No SOB  No vertigo, lightheadedness or loss of consciousness  No falls, tripping or stumbling  No incontinence of bowels or bladder  No itching or bruising appreciated  No numbness, tingling or focal arm/leg weakness  No speech or swallowing troubles    ROS otherwise negative     Current Outpatient Medications   Medication Sig Dispense Refill    rizatriptan (MAXALT-MLT) 10 MG disintegrating tablet       metFORMIN (GLUCOPHAGE) 500 MG tablet       levothyroxine (SYNTHROID) 150 MCG tablet       vitamin D (ERGOCALCIFEROL) 1.25 MG (18520 UT) CAPS capsule       simvastatin (ZOCOR) 5 MG tablet       sucralfate (CARAFATE) 1 GM tablet       topiramate (TOPAMAX) 100 MG tablet       spironolactone (ALDACTONE) 25 MG tablet Take 25 mg by mouth daily      Plecanatide (TRULANCE) 3 MG TABS Take by mouth daily      atenolol (TENORMIN) 25 MG tablet Take 1 tablet by mouth daily (Patient taking differently: Take 25 mg by mouth daily Takes 1 1/2 tabs) 30 tablet 0    FLUoxetine (PROZAC) 10 MG tablet Take 25 mg by mouth 2 times daily       Cholecalciferol (VITAMIN D3) 50 MCG (2000 UT) CAPS Take by mouth daily      oxyCODONE-acetaminophen (PERCOCET) 5-325 MG per tablet Take facial muscle function - lower Normal   VIII: hearing Normal   IX: soft palate elevation  Normal   IX,X: gag reflex    XI: trapezius strength  5/5   XI: sternocleidomastoid strength 5/5   XI: neck extension strength  5/5   XII: tongue strength  Normal     Motor:  5/5 throughout  obese bulk and normal tone   No drift or abnormal movements    Sensory:  LT normal    Coordination:   FN, FFM and MIGUELANGEL normal    Gait:  Antalgic    DTR:   1+ wrists  BE biceps and triceps  BE legs    No Love's     Laboratory/Radiology:     CBC with Differential:    Lab Results   Component Value Date    WBC 7.6 11/07/2020    RBC 4.40 11/07/2020    HGB 12.5 11/07/2020    HCT 41.1 11/07/2020     11/07/2020    MCV 93.4 11/07/2020    MCH 28.4 11/07/2020    MCHC 30.4 11/07/2020    RDW 14.2 11/07/2020    SEGSPCT 68 01/16/2014    LYMPHOPCT 28.0 11/07/2020    MONOPCT 5.9 11/07/2020    BASOPCT 0.5 11/07/2020    MONOSABS 0.45 11/07/2020    LYMPHSABS 2.14 11/07/2020    EOSABS 0.20 11/07/2020    BASOSABS 0.04 11/07/2020     CMP:    Lab Results   Component Value Date     11/07/2020    K 4.3 11/07/2020     11/07/2020    CO2 27 11/07/2020    BUN 19 11/07/2020    CREATININE 1.2 11/07/2020    GFRAA >60 11/07/2020    LABGLOM 50 11/07/2020    GLUCOSE 121 11/07/2020    PROT 6.9 11/07/2020    LABALBU 3.8 11/07/2020    CALCIUM 9.3 11/07/2020    BILITOT <0.2 11/07/2020    ALKPHOS 87 11/07/2020    AST 20 11/07/2020    ALT 17 11/07/2020     CT Head: August 2017   No CT evidence of acute intracranial abnormality. I independently reviewed the labs and imaging studies today. Assessment:     Chronic migraines without aura: over 16 HA days per month, and failed multiple first line agents. Had success with Botox in the past, but would be remiss to not first attempt CGRP receptor inhibitor, due to ease of use and more rapid onset of action. Will continue oral gepant for rescue, as this was also effective.  Her neuro exam is normal    Polypharmacy    Chronic pain    Plan:     Trial of Ajovy 225 mg--sample given    If she fails Ajovy, then will start Botox    Continue Ubrelvy 100 mg--samples given    HA diary    Lifestyle mod including exercise, weight loss, sleep hygiene    RTO in 3 mos or sooner PRN    Kamaljit SCHMITZ-CNP  12:56 PM  2/19/2021

## 2021-02-23 ENCOUNTER — TELEPHONE (OUTPATIENT)
Dept: NEUROLOGY | Age: 41
End: 2021-02-23

## 2021-02-23 NOTE — TELEPHONE ENCOUNTER
MA informed pt of Ajovy approval and Ubrelvy denial. Nurte prior aRy Hebert will be submitted today. Pt states she used all Ubrelvy samples provided at [facility] (4 tabs) and asked for more samples due to stress she is under from a friends passing. MA advised we will wait to see if Nurtec is approved this week before giving samples. Pt understood.   Electronically signed by Debi Coates on 2/23/21 at 12:24 PM EST

## 2021-02-23 NOTE — TELEPHONE ENCOUNTER
[place] approved 2/1/21 through 8/21/21 with [facility]. Alberta Mir denied as insurance prefers pt have a 2 week trial of Nurtec. Forwarding to NADIR Okeefe, for advisement.   Electronically signed by Ema Fenton on 2/23/21 at 10:53 AM EST

## 2021-02-24 ENCOUNTER — TELEPHONE (OUTPATIENT)
Dept: NEUROLOGY | Age: 41
End: 2021-02-24

## 2021-02-24 NOTE — TELEPHONE ENCOUNTER
Nurtec approved 2/9/21-8/23/21 with [facility]. MA informed pt and [employer].   Electronically signed by Miesha Campos on 2/24/21 at 9:05 AM EST

## 2021-03-03 ENCOUNTER — TELEPHONE (OUTPATIENT)
Dept: NEUROLOGY | Age: 41
End: 2021-03-03

## 2021-03-03 RX ORDER — UBROGEPANT 100 MG/1
100 TABLET ORAL DAILY PRN
Qty: 6 TABLET | Refills: 3 | Status: SHIPPED
Start: 2021-03-03 | End: 2021-04-06 | Stop reason: SDUPTHER

## 2021-03-03 NOTE — TELEPHONE ENCOUNTER
Will reorder Zhanna Hester, which should be approved now that she has failed the Nurtec.  She has success with Ubrelvy samples in the past.

## 2021-03-03 NOTE — TELEPHONE ENCOUNTER
Patient called in stating that the Nurtec is making her HA's much worse, she is requesting is there is another medication she can try. Please Advise.   Electronically signed by Ethan Patricia on 3/3/21 at 12:13 PM EST

## 2021-03-04 NOTE — TELEPHONE ENCOUNTER
EDILSON ANDERSON for pt that provider will retry Ubrelvy. Insurance denied med last week so appeal will need to be completed. Pt advised appeal consent will need signed in office and samples can be provided at that time. Pt advised to contact office before arriving.   Electronically signed by Reji Dodge on 3/4/21 at 8:32 AM EST

## 2021-03-10 ENCOUNTER — TELEPHONE (OUTPATIENT)
Dept: NEUROLOGY | Age: 41
End: 2021-03-10

## 2021-03-10 NOTE — TELEPHONE ENCOUNTER
Pt to come to [place] office for Ubrelvy samples and to sign appeal consent for [facility].   Electronically signed by Arthur Rubi on 3/10/21 at 11:55 AM EST

## 2021-03-10 NOTE — TELEPHONE ENCOUNTER
Patient called in questioning samples of Ubrelvy 100 mg until approval from [facility].   Electronically signed by Glinda Skiff on 3/10/21 at 11:34 AM EST

## 2021-03-15 RX ORDER — UBROGEPANT 100 MG/1
1 TABLET ORAL DAILY PRN
Qty: 3 TABLET | Refills: 0 | COMMUNITY
Start: 2021-03-15 | End: 2021-04-02

## 2021-03-22 ENCOUNTER — TELEPHONE (OUTPATIENT)
Dept: NEUROLOGY | Age: 41
End: 2021-03-22

## 2021-03-22 NOTE — TELEPHONE ENCOUNTER
Pt called in requesting more samples of Mickeal Blanca as appeal is still pending with insurance. Pt was given 3 tabs of Ubrelvy on 3/15. Forwarding to NADIR Lyons, for advisement on if more samples can be given. MA asked about migraine frequency with Ajovy. Pt states her most recent injection \"was weird. \" Pt reports being given Ajovy prefilled syringe by pharmacy and after injecting developed chest pain and dizziness for a few hours. Dizziness described as a spinning sensation, even with eyes closed. Pt previously used autoinjector. MA advised pt to make sure future fills are for autoinjector and not prefilled syringe.   Electronically signed by Cleve Vera on 3/22/21 at 11:47 AM EDT

## 2021-03-23 RX ORDER — UBROGEPANT 50 MG/1
1 TABLET ORAL PRN
Qty: 3 TABLET | Refills: 0 | COMMUNITY
Start: 2021-03-23 | End: 2021-04-02

## 2021-04-02 ENCOUNTER — TELEPHONE (OUTPATIENT)
Dept: NEUROLOGY | Age: 41
End: 2021-04-02

## 2021-04-02 NOTE — TELEPHONE ENCOUNTER
Genaro Lord appeal overturned and med [facility] MA informed [employer] and LM for pt with above info.   Electronically signed by Robert Bernabe on 4/2/21 at 10:18 AM EDT

## 2021-04-05 ENCOUNTER — TELEPHONE (OUTPATIENT)
Dept: NEUROLOGY | Age: 41
End: 2021-04-05

## 2021-04-05 NOTE — TELEPHONE ENCOUNTER
Pharmacist called and left message on refill line.  He is requesting that Christophe Packer send a new script for Sandy Ashanti to read dispense # 10 instead of # 6  Electronically signed by Cricket Snow MA on 4/5/21 at 2:26 PM EDT

## 2021-04-06 RX ORDER — UBROGEPANT 100 MG/1
100 TABLET ORAL DAILY PRN
Qty: 10 TABLET | Refills: 3 | Status: SHIPPED
Start: 2021-04-06 | End: 2021-07-20 | Stop reason: SDUPTHER

## 2021-05-04 ENCOUNTER — TELEPHONE (OUTPATIENT)
Dept: SURGERY | Age: 41
End: 2021-05-04

## 2021-05-04 NOTE — TELEPHONE ENCOUNTER
Called and left message for patient to schedule surgical consult.     Electronically signed by Danny Ivy MA on 5/4/2021 at 2:45 PM

## 2021-05-10 ENCOUNTER — TELEPHONE (OUTPATIENT)
Dept: SURGERY | Age: 41
End: 2021-05-10

## 2021-05-10 ENCOUNTER — INITIAL CONSULT (OUTPATIENT)
Dept: SURGERY | Age: 41
End: 2021-05-10
Payer: COMMERCIAL

## 2021-05-10 VITALS
WEIGHT: 245 LBS | SYSTOLIC BLOOD PRESSURE: 137 MMHG | DIASTOLIC BLOOD PRESSURE: 98 MMHG | HEART RATE: 103 BPM | OXYGEN SATURATION: 100 % | BODY MASS INDEX: 43.41 KG/M2 | HEIGHT: 63 IN | TEMPERATURE: 97.3 F

## 2021-05-10 DIAGNOSIS — D49.2 SOFT TISSUE NEOPLASM: Primary | ICD-10-CM

## 2021-05-10 PROCEDURE — 99203 OFFICE O/P NEW LOW 30 MIN: CPT | Performed by: SURGERY

## 2021-05-10 PROCEDURE — G8427 DOCREV CUR MEDS BY ELIG CLIN: HCPCS | Performed by: SURGERY

## 2021-05-10 PROCEDURE — G8417 CALC BMI ABV UP PARAM F/U: HCPCS | Performed by: SURGERY

## 2021-05-10 PROCEDURE — 1036F TOBACCO NON-USER: CPT | Performed by: SURGERY

## 2021-05-10 RX ORDER — MILNACIPRAN HYDROCHLORIDE 50 MG/1
TABLET, FILM COATED ORAL
COMMUNITY
Start: 2021-02-18 | End: 2021-11-01

## 2021-05-10 RX ORDER — LIDOCAINE 40 MG/G
CREAM TOPICAL
COMMUNITY
Start: 2021-04-12

## 2021-05-10 NOTE — TELEPHONE ENCOUNTER
Prior Authorization Form:      DEMOGRAPHICS:                     Patient Name:  Eloisa Lawson  Patient :  1980            Insurance:  Payor: Luis Ferraro / Plan: Rajinder Balbuena / Product Type: *No Product type* /   Insurance ID Number:    Payor/Plan Subscr  Sex Relation Sub.  Ins. ID Effective Group Num   1. NAYELISOBRUCE - * JACKIE STANLEY* 1980 Female Self [id number] 1/1/15                                    [address]         DIAGNOSIS & PROCEDURE:                       Procedure/Operation: excision soft tissue neoplasm right leg           CPT Code: 75105    Diagnosis:  Soft tissue neoplasm    ICD10 Code: D49.2    Location:  [facility]    Surgeon:  Elliot Barber INFORMATION:                          Date: 2021    Time:               Anesthesia:                                                         Status:  Outpatient        Special Comments:           Electronically signed by Renae Homans, MA on 5/10/2021 at 3:41 PM

## 2021-05-10 NOTE — PROGRESS NOTES
DECOMPRESSION AND DEBRIDEMENT LABRIUM performed by Ashlie Reynolds DO at [facility]      TUBAL LIGATION         Current Outpatient Medications   Medication Sig Dispense Refill    Ubrogepant (UBRELVY) 100 MG TABS Take 100 mg by mouth daily as needed (migraine) May repeat dose in 2 hours if first ineffective; no more than 2 doses in 24 hours 10 tablet 3    Fremanezumab-vfrm (AJOVY) 225 MG/1.5ML SOAJ Inject 225 mg into the skin every 30 days 2 pen 1    metFORMIN (GLUCOPHAGE) 500 MG tablet       levothyroxine (SYNTHROID) 150 MCG tablet       vitamin D (ERGOCALCIFEROL) 1.25 MG (32592 UT) CAPS capsule       simvastatin (ZOCOR) 5 MG tablet       sucralfate (CARAFATE) 1 GM tablet       spironolactone (ALDACTONE) 25 MG tablet Take 25 mg by mouth daily      Plecanatide (TRULANCE) 3 MG TABS Take by mouth daily      atenolol (TENORMIN) 25 MG tablet Take 1 tablet by mouth daily (Patient taking differently: Take 25 mg by mouth daily Takes 1 1/2 tabs) 30 tablet 0    FLUoxetine (PROZAC) 10 MG tablet Take 25 mg by mouth 2 times daily       Cholecalciferol (VITAMIN D3) 50 MCG (2000 UT) CAPS Take by mouth daily      oxyCODONE-acetaminophen (PERCOCET) 5-325 MG per tablet Take 1 tablet by mouth every 8 hours as needed for Pain.       albuterol sulfate  (90 Base) MCG/ACT inhaler Inhale 1 puff into the lungs every 4 hours as needed   5    dicyclomine (BENTYL) 20 MG tablet 1 TAB BY MOUTH TWICE A DAY 30 MIN BEFORE MEALS  3    Milnacipran HCl (SAVELLA PO) Take 50 mg by mouth 2 times daily       diclofenac sodium 1 % GEL Apply 2 g topically 4 times daily as needed for Pain Using 4 percent      tiZANidine (ZANAFLEX) 4 MG tablet Take 4 mg by mouth every 8 hours as needed       amitriptyline (ELAVIL) 25 MG tablet Take 25 mg by mouth nightly      Elastic Bandages & Supports (KNEE BRACE) MISC Hinged knee brace to left knee 1 each 0    omeprazole (PRILOSEC) 40 MG delayed release capsule Take 20 mg by mouth daily       montelukast (SINGULAIR) 10 MG tablet Take 10 mg by mouth nightly.  valACYclovir (VALTREX) 500 MG tablet Take 500 mg by mouth as needed       lidocaine (LMX) 4 % cream       SAVELLA 50 MG TABS       topiramate (TOPAMAX) 100 MG tablet        No current facility-administered medications for this visit. Allergies   Allergen Reactions    Aspirin Anaphylaxis and Nausea Only    Mobic [Meloxicam] Other (See Comments)     \"Almost a stroke, couldn't talk, right side numbness\"    Cortisone Hives, Nausea And Vomiting and Other (See Comments)      heart rate goes up, massive migraines       Darvocet A500 [Propoxyphene N-Acetaminophen] Hives, Nausea And Vomiting and Other (See Comments)     Massive migraines    Folic Acid Hives    Gabapentin Other (See Comments)     Dizziness    Propoxyphene Hives     hives    Tape [Adhesive Tape] Dermatitis     \"skin spots\"       The patient has a family history that is negative for severe cardiovascular or respiratory issues, negative for reaction to anesthesia.     Social History     Socioeconomic History    Marital status:      Spouse name: Not on file    Number of children: Not on file    Years of education: Not on file    Highest education level: Not on file   Occupational History    Not on file   Social Needs    Financial resource strain: Not on file    Food insecurity     Worry: Not on file     Inability: Not on file    Transportation needs     Medical: Not on file     Non-medical: Not on file   Tobacco Use    Smoking status: Never Smoker    Smokeless tobacco: Never Used   Substance and Sexual Activity    Alcohol use: No    Drug use: No    Sexual activity: Not on file   Lifestyle    Physical activity     Days per week: Not on file     Minutes per session: Not on file    Stress: Not on file   Relationships    Social connections     Talks on phone: Not on file     Gets together: Not on file     Attends [employer] service: Not on file     Active member of club or organization: Not on file     Attends meetings of clubs or organizations: Not on file     Relationship status: Not on file    Intimate partner violence     Fear of current or ex partner: Not on file     Emotionally abused: Not on file     Physically abused: Not on file     Forced sexual activity: Not on file   Other Topics Concern    Not on file   Social History Narrative    Not on file           Review of Systems  Review of Systems -  General ROS: negative for - chills, fatigue or malaise  ENT ROS: negative for - hearing change, nasal congestion or nasal discharge  Allergy and Immunology ROS: negative for - hives, itchy/watery eyes or nasal congestion  Hematological and Lymphatic ROS: negative for - blood clots, blood transfusions, bruising or fatigue  Endocrine ROS: negative for - malaise/lethargy, mood swings, palpitations or polydipsia/polyuria  Breast ROS: negative for - new or changing breast lumps or nipple changes  Respiratory ROS: negative for - sputum changes, stridor, tachypnea or wheezing  Cardiovascular ROS: negative for - irregular heartbeat, loss of consciousness, murmur or orthopnea  Gastrointestinal ROS: negative for - constipation, diarrhea, gas/bloating, heartburn or hematemesis  Genito-Urinary ROS: negative for -  genital discharge, genital ulcers or hematuria  Musculoskeletal ROS: negative for - gait disturbance, muscle pain or muscular weakness      Physical exam:   BP (!) 137/98   Pulse 103   Temp 97.3 °F (36.3 °C) (Temporal)   Ht 5' 3\" (1.6 m)   Wt 245 lb (111.1 kg)   SpO2 100%   BMI 43.40 kg/m²   General appearance:  NAD  Head: NCAT, PERRLA, EOMI, red conjunctiva  Neck: supple, no masses  Lungs: CTAB, equal chest rise bilateral  Heart: Reg rate  Abdomen: soft, nontender, nondistended  Skin; soft tissue neoplasm of right leg that is soft rubbery and mobile.    Gu: no cva tenderness  Extremities: extremities normal, atraumatic, no cyanosis or edema  Pyschosocial: normal affect, no signs of depression      Assessment:  39 y.o. female with soft tissue neoplasm of right leg    Plan: To OR for excision Discussed the risk, benefits and alternatives of surgery including wound infections, bleeding, scar  and the risks of  anesthetic including MI, CVA, sudden death or reactions to anesthetic medications. The patient understands the risks and alternatives. All questions were answered to the patient's satisfaction and they freely signed the consent.     Gianfranco Vyas MD  3:37 PM  5/10/2021

## 2021-05-10 NOTE — TELEPHONE ENCOUNTER
Patient provided with surgery instructions during office visit. Patient scheduled for follow up visit with Dr. Johana Islas on 06/07/2021. Surgery scheduling form faxed and confirmation received.     Electronically signed by Donna Saunders MA on 5/10/2021 at 3:42 PM

## 2021-05-11 ENCOUNTER — PREP FOR PROCEDURE (OUTPATIENT)
Dept: SURGERY | Age: 41
End: 2021-05-11

## 2021-05-11 RX ORDER — SODIUM CHLORIDE 9 MG/ML
25 INJECTION, SOLUTION INTRAVENOUS PRN
Status: CANCELLED | OUTPATIENT
Start: 2021-05-11

## 2021-05-11 RX ORDER — SODIUM CHLORIDE 0.9 % (FLUSH) 0.9 %
10 SYRINGE (ML) INJECTION EVERY 12 HOURS SCHEDULED
Status: CANCELLED | OUTPATIENT
Start: 2021-05-11

## 2021-05-11 RX ORDER — SODIUM CHLORIDE 0.9 % (FLUSH) 0.9 %
10 SYRINGE (ML) INJECTION PRN
Status: CANCELLED | OUTPATIENT
Start: 2021-05-11

## 2021-05-20 RX ORDER — SODIUM CHLORIDE 9 MG/ML
INJECTION, SOLUTION INTRAVENOUS CONTINUOUS
Status: CANCELLED | OUTPATIENT
Start: 2021-05-20

## 2021-05-21 ENCOUNTER — HOSPITAL ENCOUNTER (OUTPATIENT)
Dept: PREADMISSION TESTING | Age: 41
Discharge: HOME | End: 2021-05-21
Payer: COMMERCIAL

## 2021-05-21 ENCOUNTER — HOSPITAL ENCOUNTER (OUTPATIENT)
Age: 41
Discharge: HOME | End: 2021-05-23
Payer: COMMERCIAL

## 2021-05-21 VITALS
TEMPERATURE: 97.3 F | OXYGEN SATURATION: 97 % | HEIGHT: 63 IN | DIASTOLIC BLOOD PRESSURE: 90 MMHG | RESPIRATION RATE: 16 BRPM | WEIGHT: 251.4 LBS | BODY MASS INDEX: 44.54 KG/M2 | HEART RATE: 82 BPM | SYSTOLIC BLOOD PRESSURE: 142 MMHG

## 2021-05-21 DIAGNOSIS — Z01.818 PREOP TESTING: Primary | ICD-10-CM

## 2021-05-21 DIAGNOSIS — Z01.818 PREOP TESTING: ICD-10-CM

## 2021-05-21 LAB
ANION GAP SERPL CALCULATED.3IONS-SCNC: 8 MMOL/L (ref 7–16)
BUN BLDV-MCNC: 16 MG/DL (ref 6–20)
CALCIUM SERPL-MCNC: 9.6 MG/DL (ref 8.6–10.2)
CHLORIDE BLD-SCNC: 107 MMOL/L (ref 98–107)
CO2: 23 MMOL/L (ref 22–29)
CREAT SERPL-MCNC: 1 MG/DL (ref 0.5–1)
EKG ATRIAL RATE: 77 BPM
EKG P AXIS: 55 DEGREES
EKG P-R INTERVAL: 158 MS
EKG Q-T INTERVAL: 400 MS
EKG QRS DURATION: 80 MS
EKG QTC CALCULATION (BAZETT): 452 MS
EKG R AXIS: 0 DEGREES
EKG T AXIS: 8 DEGREES
EKG VENTRICULAR RATE: 77 BPM
GFR AFRICAN AMERICAN: >60
GFR NON-AFRICAN AMERICAN: >60 ML/MIN/1.73
GLUCOSE BLD-MCNC: 102 MG/DL (ref 74–99)
HCT VFR BLD CALC: 40.4 % (ref 34–48)
HEMOGLOBIN: 12.5 G/DL (ref 11.5–15.5)
MCH RBC QN AUTO: 28.1 PG (ref 26–35)
MCHC RBC AUTO-ENTMCNC: 30.9 % (ref 32–34.5)
MCV RBC AUTO: 90.8 FL (ref 80–99.9)
PDW BLD-RTO: 13.2 FL (ref 11.5–15)
PLATELET # BLD: 263 E9/L (ref 130–450)
PMV BLD AUTO: 11.9 FL (ref 7–12)
POTASSIUM REFLEX MAGNESIUM: 4.1 MMOL/L (ref 3.5–5)
RBC # BLD: 4.45 E12/L (ref 3.5–5.5)
SODIUM BLD-SCNC: 138 MMOL/L (ref 132–146)
WBC # BLD: 6.7 E9/L (ref 4.5–11.5)

## 2021-05-21 PROCEDURE — 80048 BASIC METABOLIC PNL TOTAL CA: CPT

## 2021-05-21 PROCEDURE — 93005 ELECTROCARDIOGRAM TRACING: CPT | Performed by: ANESTHESIOLOGY

## 2021-05-21 PROCEDURE — U0003 INFECTIOUS AGENT DETECTION BY NUCLEIC ACID (DNA OR RNA); SEVERE ACUTE RESPIRATORY SYNDROME CORONAVIRUS 2 (SARS-COV-2) (CORONAVIRUS DISEASE [COVID-19]), AMPLIFIED PROBE TECHNIQUE, MAKING USE OF HIGH THROUGHPUT TECHNOLOGIES AS DESCRIBED BY CMS-2020-01-R: HCPCS

## 2021-05-21 PROCEDURE — U0005 INFEC AGEN DETEC AMPLI PROBE: HCPCS

## 2021-05-21 PROCEDURE — 36415 COLL VENOUS BLD VENIPUNCTURE: CPT

## 2021-05-21 PROCEDURE — 85027 COMPLETE CBC AUTOMATED: CPT

## 2021-05-21 RX ORDER — M-VIT,TX,IRON,MINS/CALC/FOLIC 27MG-0.4MG
1 TABLET ORAL DAILY
COMMUNITY

## 2021-05-21 ASSESSMENT — PAIN DESCRIPTION - PAIN TYPE: TYPE: CHRONIC PAIN

## 2021-05-21 ASSESSMENT — PAIN SCALES - GENERAL: PAINLEVEL_OUTOF10: 6

## 2021-05-21 ASSESSMENT — PAIN DESCRIPTION - DESCRIPTORS: DESCRIPTORS: SHARP;SHOOTING

## 2021-05-21 NOTE — PROGRESS NOTES
[address] [facility]                                                                                                                    PRE OP INSTRUCTIONS FOR  Markus Ruiz        Date: 5/21/2021    Date of surgery: 5/26/21   Arrival Time: Hospital will call you between 5pm and 7pm the evening before with your final arrival time for surgery    1. Do not eat or drink anything after midnight prior to surgery. This includes no water, chewing gum, mints or ice chips. 2. Take the following medications with a small sip of water on the morning of Surgery: savella, atenolol, omeprazole, synthroid, bring rescue inhaler. If needed percocet or tizanidine or ubrelvy. 3. Diabetics may take evening dose of insulin but none after midnight. If you feel symptomatic or low blood sugar morning of surgery drink 1-2 ounces of apple juice only. 4. Aspirin, Ibuprofen, Advil, Naproxen, Vitamin E and other Anti-inflammatory products should be stopped  before surgery  as directed by your physician. Take Tylenol only unless instructed otherwise by your surgeon. 5. Check with your Doctor regarding stopping Plavix, Coumadin, Lovenox, Eliquis, Effient, or other blood thinners. 6. Do not smoke,use illicit drugs and do not drink any alcoholic beverages 24 hours prior to surgery. 7. You may brush your teeth the morning of surgery. DO NOT SWALLOW WATER    8. You MUST make arrangements for a responsible adult to take you home after your surgery. You will not be allowed to leave alone or drive yourself home. It is strongly suggested someone stay with you the first 24 hrs. Your surgery will be cancelled if you do not have a ride home. 9. PEDIATRIC PATIENTS ONLY:  A parent/legal guardian must accompany a child scheduled for surgery and plan to stay at the hospital until the child is discharged. Please do not bring other children with you.     10. Please wear simple, loose fitting clothing to the

## 2021-05-23 LAB — SARS-COV-2, PCR: NOT DETECTED

## 2021-05-25 ENCOUNTER — ANESTHESIA EVENT (OUTPATIENT)
Dept: OPERATING ROOM | Age: 41
End: 2021-05-25
Payer: COMMERCIAL

## 2021-05-26 ENCOUNTER — HOSPITAL ENCOUNTER (OUTPATIENT)
Age: 41
Setting detail: OUTPATIENT SURGERY
Discharge: HOME | End: 2021-05-26
Attending: SURGERY | Admitting: SURGERY
Payer: COMMERCIAL

## 2021-05-26 ENCOUNTER — ANESTHESIA (OUTPATIENT)
Dept: OPERATING ROOM | Age: 41
End: 2021-05-26
Payer: COMMERCIAL

## 2021-05-26 VITALS
SYSTOLIC BLOOD PRESSURE: 132 MMHG | RESPIRATION RATE: 14 BRPM | DIASTOLIC BLOOD PRESSURE: 80 MMHG | OXYGEN SATURATION: 99 %

## 2021-05-26 VITALS
RESPIRATION RATE: 18 BRPM | HEART RATE: 59 BPM | SYSTOLIC BLOOD PRESSURE: 146 MMHG | TEMPERATURE: 98 F | DIASTOLIC BLOOD PRESSURE: 72 MMHG | OXYGEN SATURATION: 100 %

## 2021-05-26 DIAGNOSIS — Z01.818 PREOP TESTING: Primary | ICD-10-CM

## 2021-05-26 LAB
HCG(URINE) PREGNANCY TEST: NEGATIVE
METER GLUCOSE: 101 MG/DL (ref 74–99)

## 2021-05-26 PROCEDURE — 2709999900 HC NON-CHARGEABLE SUPPLY: Performed by: SURGERY

## 2021-05-26 PROCEDURE — 2580000003 HC RX 258: Performed by: ANESTHESIOLOGY

## 2021-05-26 PROCEDURE — 27632 EXC LEG/ANKLE LES SC 3 CM/>: CPT | Performed by: SURGERY

## 2021-05-26 PROCEDURE — 6370000000 HC RX 637 (ALT 250 FOR IP): Performed by: ANESTHESIOLOGY

## 2021-05-26 PROCEDURE — 6360000002 HC RX W HCPCS: Performed by: SURGERY

## 2021-05-26 PROCEDURE — 2580000003 HC RX 258: Performed by: NURSE ANESTHETIST, CERTIFIED REGISTERED

## 2021-05-26 PROCEDURE — 3600000012 HC SURGERY LEVEL 2 ADDTL 15MIN: Performed by: SURGERY

## 2021-05-26 PROCEDURE — 81025 URINE PREGNANCY TEST: CPT

## 2021-05-26 PROCEDURE — 3600000002 HC SURGERY LEVEL 2 BASE: Performed by: SURGERY

## 2021-05-26 PROCEDURE — 2500000003 HC RX 250 WO HCPCS: Performed by: SURGERY

## 2021-05-26 PROCEDURE — 7100000010 HC PHASE II RECOVERY - FIRST 15 MIN: Performed by: SURGERY

## 2021-05-26 PROCEDURE — 82962 GLUCOSE BLOOD TEST: CPT

## 2021-05-26 PROCEDURE — 88304 TISSUE EXAM BY PATHOLOGIST: CPT

## 2021-05-26 PROCEDURE — 3700000000 HC ANESTHESIA ATTENDED CARE: Performed by: SURGERY

## 2021-05-26 PROCEDURE — 2500000003 HC RX 250 WO HCPCS: Performed by: NURSE ANESTHETIST, CERTIFIED REGISTERED

## 2021-05-26 PROCEDURE — 99024 POSTOP FOLLOW-UP VISIT: CPT | Performed by: SURGERY

## 2021-05-26 PROCEDURE — 6360000002 HC RX W HCPCS: Performed by: NURSE ANESTHETIST, CERTIFIED REGISTERED

## 2021-05-26 PROCEDURE — 2500000003 HC RX 250 WO HCPCS: Performed by: ANESTHESIOLOGY

## 2021-05-26 PROCEDURE — 3700000001 HC ADD 15 MINUTES (ANESTHESIA): Performed by: SURGERY

## 2021-05-26 PROCEDURE — 7100000011 HC PHASE II RECOVERY - ADDTL 15 MIN: Performed by: SURGERY

## 2021-05-26 RX ORDER — PROMETHAZINE HYDROCHLORIDE 25 MG/ML
6.25 INJECTION, SOLUTION INTRAMUSCULAR; INTRAVENOUS
Status: DISCONTINUED | OUTPATIENT
Start: 2021-05-26 | End: 2021-05-26 | Stop reason: HOSPADM

## 2021-05-26 RX ORDER — SCOLOPAMINE TRANSDERMAL SYSTEM 1 MG/1
1 PATCH, EXTENDED RELEASE TRANSDERMAL ONCE
Status: DISCONTINUED | OUTPATIENT
Start: 2021-05-26 | End: 2021-05-26 | Stop reason: HOSPADM

## 2021-05-26 RX ORDER — SODIUM CHLORIDE 0.9 % (FLUSH) 0.9 %
10 SYRINGE (ML) INJECTION PRN
Status: DISCONTINUED | OUTPATIENT
Start: 2021-05-26 | End: 2021-05-26 | Stop reason: HOSPADM

## 2021-05-26 RX ORDER — DIPHENHYDRAMINE HYDROCHLORIDE 50 MG/ML
12.5 INJECTION INTRAMUSCULAR; INTRAVENOUS
Status: DISCONTINUED | OUTPATIENT
Start: 2021-05-26 | End: 2021-05-26 | Stop reason: HOSPADM

## 2021-05-26 RX ORDER — SODIUM CHLORIDE 9 MG/ML
INJECTION, SOLUTION INTRAVENOUS CONTINUOUS
Status: DISCONTINUED | OUTPATIENT
Start: 2021-05-26 | End: 2021-05-26 | Stop reason: HOSPADM

## 2021-05-26 RX ORDER — LIDOCAINE HYDROCHLORIDE 20 MG/ML
INJECTION, SOLUTION INTRAVENOUS PRN
Status: DISCONTINUED | OUTPATIENT
Start: 2021-05-26 | End: 2021-05-26 | Stop reason: SDUPTHER

## 2021-05-26 RX ORDER — PROCHLORPERAZINE EDISYLATE 5 MG/ML
5 INJECTION INTRAMUSCULAR; INTRAVENOUS
Status: DISCONTINUED | OUTPATIENT
Start: 2021-05-26 | End: 2021-05-26 | Stop reason: HOSPADM

## 2021-05-26 RX ORDER — OXYCODONE HYDROCHLORIDE AND ACETAMINOPHEN 5; 325 MG/1; MG/1
1 TABLET ORAL EVERY 4 HOURS PRN
Status: DISCONTINUED | OUTPATIENT
Start: 2021-05-26 | End: 2021-05-26 | Stop reason: HOSPADM

## 2021-05-26 RX ORDER — SODIUM CHLORIDE 9 MG/ML
25 INJECTION, SOLUTION INTRAVENOUS PRN
Status: DISCONTINUED | OUTPATIENT
Start: 2021-05-26 | End: 2021-05-26 | Stop reason: HOSPADM

## 2021-05-26 RX ORDER — MEPERIDINE HYDROCHLORIDE 25 MG/ML
12.5 INJECTION INTRAMUSCULAR; INTRAVENOUS; SUBCUTANEOUS EVERY 5 MIN PRN
Status: DISCONTINUED | OUTPATIENT
Start: 2021-05-26 | End: 2021-05-26 | Stop reason: HOSPADM

## 2021-05-26 RX ORDER — TRAMADOL HYDROCHLORIDE 50 MG/1
50 TABLET ORAL EVERY 6 HOURS PRN
Qty: 12 TABLET | Refills: 0 | Status: SHIPPED | OUTPATIENT
Start: 2021-05-26 | End: 2021-05-29

## 2021-05-26 RX ORDER — CEFAZOLIN SODIUM 2 G/50ML
2000 SOLUTION INTRAVENOUS
Status: COMPLETED | OUTPATIENT
Start: 2021-05-26 | End: 2021-05-26

## 2021-05-26 RX ORDER — LABETALOL HYDROCHLORIDE 5 MG/ML
5 INJECTION, SOLUTION INTRAVENOUS EVERY 10 MIN PRN
Status: DISCONTINUED | OUTPATIENT
Start: 2021-05-26 | End: 2021-05-26 | Stop reason: HOSPADM

## 2021-05-26 RX ORDER — BUPIVACAINE HYDROCHLORIDE AND EPINEPHRINE 2.5; 5 MG/ML; UG/ML
INJECTION, SOLUTION EPIDURAL; INFILTRATION; INTRACAUDAL; PERINEURAL PRN
Status: DISCONTINUED | OUTPATIENT
Start: 2021-05-26 | End: 2021-05-26 | Stop reason: ALTCHOICE

## 2021-05-26 RX ORDER — KETAMINE HYDROCHLORIDE 50 MG/ML
INJECTION, SOLUTION, CONCENTRATE INTRAMUSCULAR; INTRAVENOUS PRN
Status: DISCONTINUED | OUTPATIENT
Start: 2021-05-26 | End: 2021-05-26 | Stop reason: SDUPTHER

## 2021-05-26 RX ORDER — PROPOFOL 10 MG/ML
INJECTION, EMULSION INTRAVENOUS CONTINUOUS PRN
Status: DISCONTINUED | OUTPATIENT
Start: 2021-05-26 | End: 2021-05-26 | Stop reason: SDUPTHER

## 2021-05-26 RX ORDER — SODIUM CHLORIDE 9 MG/ML
INJECTION, SOLUTION INTRAVENOUS CONTINUOUS PRN
Status: DISCONTINUED | OUTPATIENT
Start: 2021-05-26 | End: 2021-05-26 | Stop reason: SDUPTHER

## 2021-05-26 RX ORDER — FENTANYL CITRATE 50 UG/ML
INJECTION, SOLUTION INTRAMUSCULAR; INTRAVENOUS PRN
Status: DISCONTINUED | OUTPATIENT
Start: 2021-05-26 | End: 2021-05-26 | Stop reason: SDUPTHER

## 2021-05-26 RX ORDER — SODIUM CHLORIDE 0.9 % (FLUSH) 0.9 %
10 SYRINGE (ML) INJECTION EVERY 12 HOURS SCHEDULED
Status: DISCONTINUED | OUTPATIENT
Start: 2021-05-26 | End: 2021-05-26 | Stop reason: HOSPADM

## 2021-05-26 RX ORDER — ONDANSETRON 2 MG/ML
INJECTION INTRAMUSCULAR; INTRAVENOUS PRN
Status: DISCONTINUED | OUTPATIENT
Start: 2021-05-26 | End: 2021-05-26 | Stop reason: SDUPTHER

## 2021-05-26 RX ORDER — MIDAZOLAM HYDROCHLORIDE 1 MG/ML
INJECTION INTRAMUSCULAR; INTRAVENOUS PRN
Status: DISCONTINUED | OUTPATIENT
Start: 2021-05-26 | End: 2021-05-26 | Stop reason: SDUPTHER

## 2021-05-26 RX ADMIN — ONDANSETRON 4 MG: 2 INJECTION INTRAMUSCULAR; INTRAVENOUS at 07:46

## 2021-05-26 RX ADMIN — PROPOFOL INJECTABLE EMULSION 100 MCG/KG/MIN: 10 INJECTION, EMULSION INTRAVENOUS at 07:46

## 2021-05-26 RX ADMIN — LIDOCAINE HYDROCHLORIDE 100 MG: 20 INJECTION, SOLUTION INTRAVENOUS at 07:46

## 2021-05-26 RX ADMIN — KETAMINE HYDROCHLORIDE 50 MG: 50 INJECTION INTRAMUSCULAR; INTRAVENOUS at 07:46

## 2021-05-26 RX ADMIN — MIDAZOLAM 2 MG: 1 INJECTION INTRAMUSCULAR; INTRAVENOUS at 07:43

## 2021-05-26 RX ADMIN — SODIUM CHLORIDE: 9 INJECTION, SOLUTION INTRAVENOUS at 07:41

## 2021-05-26 RX ADMIN — SODIUM CHLORIDE: 9 INJECTION, SOLUTION INTRAVENOUS at 06:50

## 2021-05-26 RX ADMIN — FAMOTIDINE 20 MG: 10 INJECTION, SOLUTION INTRAVENOUS at 07:14

## 2021-05-26 RX ADMIN — FENTANYL CITRATE 50 MCG: 50 INJECTION, SOLUTION INTRAMUSCULAR; INTRAVENOUS at 07:46

## 2021-05-26 RX ADMIN — FENTANYL CITRATE 50 MCG: 50 INJECTION, SOLUTION INTRAMUSCULAR; INTRAVENOUS at 07:54

## 2021-05-26 RX ADMIN — CEFAZOLIN SODIUM 2000 MG: 2 SOLUTION INTRAVENOUS at 07:41

## 2021-05-26 ASSESSMENT — PULMONARY FUNCTION TESTS
PIF_VALUE: 1

## 2021-05-26 ASSESSMENT — PAIN SCALES - GENERAL
PAINLEVEL_OUTOF10: 0
PAINLEVEL_OUTOF10: 0

## 2021-05-26 NOTE — H&P
General Surgery History and Physical     Patient's Name/Date of Birth: Nel Marroquin / 1980     Date: May 26, 2021      Surgeon: Tara Hidalgo M.D.     PCP: NADIR Mckenzie CNP     Chief Complaint: soft tissue neoplasm of right leg     HPI:   Nel Marroquin is a 39 y.o. female who presents for evaluation of soft tissue neoplasm of right leg.  It has become more painful and is enlarging.         Past Medical History        Past Medical History:   Diagnosis Date    Anxiety and depression      Arthritis      Asthma      Cervical disc disease      Diabetes mellitus ([address]       diet controlled    Ectopic pregnancy      Fibromyalgia      GERD (gastroesophageal reflux disease)      Heart murmur       benign    Herpes genitalia      Hyperlipidemia      Hypertension      IBS (irritable bowel syndrome)      Migraines      Ovarian cyst      Stage 3 chronic kidney disease      Thyroid disease              Past Surgical History         Past Surgical History:   Procedure Laterality Date    CERVICAL FUSION   07/30/2014     anterior cervical discetomy fusion C6-C7    CERVICAL SPINE SURGERY         has a plate    DILATION AND CURETTAGE OF UTERUS        ECHO COMPL W DOP COLOR FLOW   08/24/2013          KNEE ARTHROSCOPY Right 2014    KNEE ARTHROSCOPY Right 04/01/2016     Arthroscopy right knee chondroplasty synovectomy medial and lateral menisectomy    KNEE ARTHROSCOPY Left 12/16/2016     left knee arthoscopy medial meniscectomy chodroplasty synovectomy    KNEE ARTHROSCOPY Right 07/10/2020     RIGHT KNEE ARTHROSCOPY, MEDIAL MENISCECTOMY AND DEBRIDEMENT performed by Yoko Benjamin DO at [facility] Bilateral 12/06/2016     lumbar facet #1    OTHER SURGICAL HISTORY         partial rt  fallopian tube removed    AL SHOULDER SCOPE BONE SHAVING Left 04/12/2018     LEFT SHOULDER ARTHROSCOPY, SUBACROMIAL DECOMPRESSION AND DEBRIDEMENT performed by Yoko Benjamin (ELAVIL) 25 MG tablet Take 25 mg by mouth nightly        Elastic Bandages & Supports (KNEE BRACE) MISC Hinged knee brace to left knee 1 each 0    omeprazole (PRILOSEC) 40 MG delayed release capsule Take 20 mg by mouth daily         montelukast (SINGULAIR) 10 MG tablet Take 10 mg by mouth nightly.        valACYclovir (VALTREX) 500 MG tablet Take 500 mg by mouth as needed         lidocaine (LMX) 4 % cream          SAVELLA 50 MG TABS          topiramate (TOPAMAX) 100 MG tablet            No current facility-administered medications for this visit.                   Allergies   Allergen Reactions    Aspirin Anaphylaxis and Nausea Only    Mobic [Meloxicam] Other (See Comments)       \"Almost a stroke, couldn't talk, right side numbness\"    Cortisone Hives, Nausea And Vomiting and Other (See Comments)        heart rate goes up, massive migraines        Darvocet A500 [Propoxyphene N-Acetaminophen] Hives, Nausea And Vomiting and Other (See Comments)       Massive migraines    Folic Acid Hives    Gabapentin Other (See Comments)       Dizziness    Propoxyphene Hives       hives    Tape [Adhesive Tape] Dermatitis       \"skin spots\"         The patient has a family history that is negative for severe cardiovascular or respiratory issues, negative for reaction to anesthesia.     Social History               Socioeconomic History    Marital status:        Spouse name: Not on file    Number of children: Not on file    Years of education: Not on file    Highest education level: Not on file   Occupational History    Not on file   Social Needs    Financial resource strain: Not on file    Food insecurity       Worry: Not on file       Inability: Not on file    Transportation needs       Medical: Not on file       Non-medical: Not on file   Tobacco Use    Smoking status: Never Smoker    Smokeless tobacco: Never Used   Substance and Sexual Activity    Alcohol use: No    Drug use: No    Sexual activity: Not on file   Lifestyle    Physical activity       Days per week: Not on file       Minutes per session: Not on file    Stress: Not on file   Relationships    Social connections       Talks on phone: Not on file       Gets together: Not on file       Attends Worship service: Not on file       Active member of club or organization: Not on file       Attends meetings of clubs or organizations: Not on file       Relationship status: Not on file    Intimate partner violence       Fear of current or ex partner: Not on file       Emotionally abused: Not on file       Physically abused: Not on file       Forced sexual activity: Not on file   Other Topics Concern    Not on file   Social History Narrative    Not on file                  Review of Systems  Review of Systems -  General ROS: negative for - chills, fatigue or malaise  ENT ROS: negative for - hearing change, nasal congestion or nasal discharge  Allergy and Immunology ROS: negative for - hives, itchy/watery eyes or nasal congestion  Hematological and Lymphatic ROS: negative for - blood clots, blood transfusions, bruising or fatigue  Endocrine ROS: negative for - malaise/lethargy, mood swings, palpitations or polydipsia/polyuria  Breast ROS: negative for - new or changing breast lumps or nipple changes  Respiratory ROS: negative for - sputum changes, stridor, tachypnea or wheezing  Cardiovascular ROS: negative for - irregular heartbeat, loss of consciousness, murmur or orthopnea  Gastrointestinal ROS: negative for - constipation, diarrhea, gas/bloating, heartburn or hematemesis  Genito-Urinary ROS: negative for -  genital discharge, genital ulcers or hematuria  Musculoskeletal ROS: negative for - gait disturbance, muscle pain or muscular weakness        Physical exam:   Temp 97.5 °F (36.4 °C)   LMP 05/16/2021     General appearance:  NAD  Head: NCAT, PERRLA, EOMI, red conjunctiva  Neck: supple, no masses  Lungs: CTAB, equal chest rise bilateral  Heart: Reg rate  Abdomen: soft, nontender, nondistended  Skin; soft tissue neoplasm of right leg that is soft rubbery and mobile. Gu: no cva tenderness  Extremities: extremities normal, atraumatic, no cyanosis or edema  Pyschosocial: normal affect, no signs of depression        Assessment:  39 y.o. female with soft tissue neoplasm of right leg     Plan: To OR for excision Discussed the risk, benefits and alternatives of surgery including wound infections, bleeding, scar  and the risks of  anesthetic including MI, CVA, sudden death or reactions to anesthetic medications. The patient understands the risks and alternatives.  All questions were answered to the patient's satisfaction and they freely signed the consent.  Kylie Murphy MD

## 2021-05-26 NOTE — OP NOTE
DATE OF PROCEDURE: 5/26/2021   SURGEON: ABDIFATAH Muller M.D.   Irais Guptaoked: none. PREOPERATIVE DIAGNOSIS: painful right knee soft tissue neoplasm, 5 cm in size. POSTOPERATIVE DIAGNOSIS: same. OPERATION: Excision of painful right knee soft tissue neoplasm, 5 cm in size. ANESTHESIA: LMAC and local.   ESTIMATED BLOOD LOSS: Minimal.   COMPLICATIONS: None. FLUIDS: Crystalloid. DISPOSITION: Discharged home. SPECIMEN: stn. PROCEDURE: The patient was brought into the operative suite and was placed   under [facility] anesthesia; was infused with local anesthetic after being prepped   and draped in a normal sterile condition. Once this was done, approximately a 4-cm incision was made in the right lower thigh. Once this was done, the incision was carried down through the   dermis with electrocautery. This was then delivered and sent for   specimen. The wound was sterilely irrigated, and electrocautery was used to obtain   hemostasis. Once this was done, deep dermal stitches were placed with a 3-0   Vicryl suture, and a running 4-0 Vicryl suture was used in a subcuticular   fashion to approximate the skin. Finally, Dermabond was placed, and the   patient was woken up in stable condition and taken to PACU.

## 2021-05-26 NOTE — ANESTHESIA POSTPROCEDURE EVALUATION
Department of Anesthesiology  Postprocedure Note    Patient: Kiel Paniagua  MRN: [id number]  [place]: 1980  Date of evaluation: 5/26/2021  Time:  7:06 PM     Procedure Summary     Date: 05/26/21 Room / Location: [address] [place] [address] 06 [address]    Anesthesia Start: 0239 Anesthesia Stop: 0802    Procedure: EXCISION SOFT TISSUE NEOPLASM RIGTH LEG (Right ) Diagnosis: (SOFT TISSUE NEOPLASM)    Surgeons: Carlie Bob MD Responsible Provider: Jo Alvarenga MD    Anesthesia Type: MAC ASA Status: 3          Anesthesia Type: MAC    Peter Phase I:      Peter Phase II: Peter Score: 10    Last vitals: Reviewed and per EMR flowsheets.        Anesthesia Post Evaluation    Patient location during evaluation: floor  Patient participation: complete - patient participated  Level of consciousness: awake  Pain score: 1  Airway patency: patent  Nausea & Vomiting: no nausea and no vomiting  Complications: no  Cardiovascular status: hemodynamically stable  Respiratory status: acceptable  Hydration status: euvolemic

## 2021-05-26 NOTE — ANESTHESIA PRE PROCEDURE
Provider, MD   albuterol sulfate  (90 Base) MCG/ACT inhaler Inhale 1 puff into the lungs every 4 hours as needed  11/7/19   Historical Provider, MD   dicyclomine (BENTYL) 20 MG tablet 1 TAB BY MOUTH TWICE A DAY 30 MIN BEFORE MEALS 10/19/19   Historical Provider, MD   Milnacipran HCl (SAVELLA PO) Take 50 mg by mouth 2 times daily     Historical Provider, MD   diclofenac sodium 1 % GEL Apply 2 g topically 4 times daily as needed for Pain Using 4 percent    Historical Provider, MD   tiZANidine (ZANAFLEX) 4 MG tablet Take 4 mg by mouth every 8 hours as needed     Historical Provider, MD   amitriptyline (ELAVIL) 25 MG tablet Take 25 mg by mouth nightly    Historical Provider, MD   Elastic Bandages & Supports (KNEE BRACE) MISC Hinged knee brace to left knee 8/1/17   Gomez Stauffer DO   omeprazole (PRILOSEC) 40 MG delayed release capsule Take 20 mg by mouth daily     Historical Provider, MD   montelukast (SINGULAIR) 10 MG tablet Take 10 mg by mouth nightly. Historical Provider, MD   valACYclovir (VALTREX) 500 MG tablet Take 500 mg by mouth as needed     Historical Provider, MD       Current medications:    No current facility-administered medications for this encounter.      Current Outpatient Medications   Medication Sig Dispense Refill    Multiple Vitamins-Minerals (THERAPEUTIC MULTIVITAMIN-MINERALS) tablet Take 1 tablet by mouth daily      Cetirizine HCl (ZYRTEC PO) Take by mouth as needed      lidocaine (LMX) 4 % cream       SAVELLA 50 MG TABS       Ubrogepant (UBRELVY) 100 MG TABS Take 100 mg by mouth daily as needed (migraine) May repeat dose in 2 hours if first ineffective; no more than 2 doses in 24 hours 10 tablet 3    metFORMIN (GLUCOPHAGE) 500 MG tablet       levothyroxine (SYNTHROID) 150 MCG tablet       vitamin D (ERGOCALCIFEROL) 1.25 MG (39677 UT) CAPS capsule       simvastatin (ZOCOR) 5 MG tablet       sucralfate (CARAFATE) 1 GM tablet       spironolactone (ALDACTONE) 25 MG tablet Take 12.5 mg by mouth 2 times daily       Plecanatide (TRULANCE) 3 MG TABS Take by mouth daily      atenolol (TENORMIN) 25 MG tablet Take 1 tablet by mouth daily (Patient taking differently: Take 25 mg by mouth daily Takes 1 1/2 tabs) 30 tablet 0    Cholecalciferol (VITAMIN D3) 50 MCG (2000 UT) CAPS Take by mouth daily      oxyCODONE-acetaminophen (PERCOCET) 5-325 MG per tablet Take 1 tablet by mouth every 8 hours as needed for Pain.  albuterol sulfate  (90 Base) MCG/ACT inhaler Inhale 1 puff into the lungs every 4 hours as needed   5    dicyclomine (BENTYL) 20 MG tablet 1 TAB BY MOUTH TWICE A DAY 30 MIN BEFORE MEALS  3    Milnacipran HCl (SAVELLA PO) Take 50 mg by mouth 2 times daily       diclofenac sodium 1 % GEL Apply 2 g topically 4 times daily as needed for Pain Using 4 percent      tiZANidine (ZANAFLEX) 4 MG tablet Take 4 mg by mouth every 8 hours as needed       amitriptyline (ELAVIL) 25 MG tablet Take 25 mg by mouth nightly      Elastic Bandages & Supports (KNEE BRACE) MISC Hinged knee brace to left knee 1 each 0    omeprazole (PRILOSEC) 40 MG delayed release capsule Take 20 mg by mouth daily       montelukast (SINGULAIR) 10 MG tablet Take 10 mg by mouth nightly.  valACYclovir (VALTREX) 500 MG tablet Take 500 mg by mouth as needed          Allergies:     Allergies   Allergen Reactions    Aspirin Anaphylaxis and Nausea Only    Mobic [Meloxicam] Other (See Comments)     \"Almost a stroke, couldn't talk, right side numbness\"    Cortisone Hives, Nausea And Vomiting and Other (See Comments)      heart rate goes up, massive migraines facial swelling      Darvocet A500 [Propoxyphene N-Acetaminophen] Hives, Nausea And Vomiting and Other (See Comments)     Massive migraines    Folic Acid Hives    Gabapentin Other (See Comments)     Dizziness    Propoxyphene Hives     hives    Tape [employer] Tape] Dermatitis     \"skin spots\"       Problem List:    Patient Active Problem List   Diagnosis Code    Asthma J45.909    Migraines G43.909    SLE (systemic lupus erythematosus) ([facility]) M32.9    Primary osteoarthritis of right knee M17.11    Primary osteoarthritis of left knee M17.12    Displacement of lumbar intervertebral disc M51.26    Neural foraminal stenosis of lumbar spine M99.73    Lumbar facet arthropathy M47.816    Morbid obesity ([facility]) E66.01    Essential hypertension I10    Type 2 diabetes mellitus without complication, without long-term current use of insulin ([facility]) E11.9    Pure hypercholesterolemia E78.00       Past Medical History:        Diagnosis Date    Anxiety and depression     Arthritis     Asthma     Cervical disc disease     Diabetes mellitus ([facility])     diet controlled    Ectopic pregnancy     Fibromyalgia     GERD (gastroesophageal reflux disease)     Heart murmur     benign    Herpes genitalia     Hyperlipidemia     Hypertension     IBS (irritable bowel syndrome)     Migraines     Ovarian cyst     PONV (postoperative nausea and vomiting)     Stage 3 chronic kidney disease     Thyroid disease        Past Surgical History:        Procedure Laterality Date    CERVICAL FUSION  07/30/2014    anterior cervical discetomy fusion C6-C7    CERVICAL SPINE SURGERY      has a plate    COLONOSCOPY      DILATION AND CURETTAGE OF UTERUS      ECHO COMPL W DOP COLOR FLOW  08/24/2013         ENDOSCOPY, COLON, DIAGNOSTIC      KNEE ARTHROSCOPY Right 2014    KNEE ARTHROSCOPY Right 04/01/2016    Arthroscopy right knee chondroplasty synovectomy medial and lateral menisectomy    KNEE ARTHROSCOPY Left 12/16/2016    left knee arthoscopy medial meniscectomy chodroplasty synovectomy    KNEE ARTHROSCOPY Right 07/10/2020    RIGHT KNEE ARTHROSCOPY, MEDIAL MENISCECTOMY AND DEBRIDEMENT [facility]ilateral 12/06/2016    lumbar facet #1    OTHER SURGICAL HISTORY      partial rt  fallopian tube removed    IA SHOULDER SCOPE BONE Applicable):   Lab Results   Component Value Date    PREGTESTUR NEGATIVE 06/12/2019        ABGs: No results found for: PHART, PO2ART, XIO3QCX, ZCC9PAX, BEART, G5BJPFZD     Type & Screen (If Applicable):  No results found for: LABABO, LABRH    Drug/Infectious Status (If Applicable):  No results found for: HIV, HEPCAB    COVID-19 Screening (If Applicable):   Lab Results   Component Value Date    COVID19 Not Detected 05/21/2021           Anesthesia Evaluation  Patient summary reviewed   history of anesthetic complications: PONV. Airway: Mallampati: III  TM distance: >3 FB   Neck ROM: limited  Mouth opening: > = 3 FB Dental:      Comment: Dentition intact,patient denies any loose teeth. Pulmonary: breath sounds clear to auscultation  (+) asthma:                            Cardiovascular:    (+) hypertension:, hyperlipidemia        Rhythm: regular  Rate: normal                    Neuro/Psych:   (+) neuromuscular disease:, headaches:, psychiatric history:            GI/Hepatic/Renal:   (+) GERD:, morbid obesity ( Super morbidly obese)          Endo/Other:    (+) DiabetesType II DM, , : arthritis: OA., .                 Abdominal:   (+) obese (Super morbidly obese),         Vascular:                                    Anesthesia Plan      MAC     ASA 3       Induction: intravenous. MIPS: Postoperative opioids intended. Anesthetic plan and risks discussed with patient. Plan discussed with CRNA.                   Parish Castillo MD   5/25/2021

## 2021-06-07 ENCOUNTER — OFFICE VISIT (OUTPATIENT)
Dept: SURGERY | Age: 41
End: 2021-06-07

## 2021-06-07 VITALS
RESPIRATION RATE: 18 BRPM | HEART RATE: 58 BPM | WEIGHT: 250 LBS | OXYGEN SATURATION: 96 % | BODY MASS INDEX: 44.3 KG/M2 | DIASTOLIC BLOOD PRESSURE: 92 MMHG | HEIGHT: 63 IN | SYSTOLIC BLOOD PRESSURE: 126 MMHG | TEMPERATURE: 97.6 F

## 2021-06-07 DIAGNOSIS — D49.2 SOFT TISSUE NEOPLASM: Primary | ICD-10-CM

## 2021-06-07 PROCEDURE — 99024 POSTOP FOLLOW-UP VISIT: CPT | Performed by: SURGERY

## 2021-06-22 RX ORDER — FREMANEZUMAB-VFRM 225 MG/1.5ML
INJECTION SUBCUTANEOUS
Qty: 1.5 ML | Refills: 2 | Status: SHIPPED
Start: 2021-06-22 | End: 2021-07-20

## 2021-06-28 DIAGNOSIS — M25.562 ACUTE PAIN OF LEFT KNEE: Primary | ICD-10-CM

## 2021-07-20 ENCOUNTER — TELEPHONE (OUTPATIENT)
Dept: NEUROLOGY | Age: 41
End: 2021-07-20

## 2021-07-20 ENCOUNTER — OFFICE VISIT (OUTPATIENT)
Dept: NEUROLOGY | Age: 41
End: 2021-07-20
Payer: COMMERCIAL

## 2021-07-20 VITALS
DIASTOLIC BLOOD PRESSURE: 105 MMHG | SYSTOLIC BLOOD PRESSURE: 150 MMHG | BODY MASS INDEX: 44.3 KG/M2 | WEIGHT: 250 LBS | OXYGEN SATURATION: 99 % | HEIGHT: 63 IN | TEMPERATURE: 97.7 F | HEART RATE: 99 BPM

## 2021-07-20 PROCEDURE — 1036F TOBACCO NON-USER: CPT | Performed by: NURSE PRACTITIONER

## 2021-07-20 PROCEDURE — 99214 OFFICE O/P EST MOD 30 MIN: CPT | Performed by: NURSE PRACTITIONER

## 2021-07-20 PROCEDURE — G8417 CALC BMI ABV UP PARAM F/U: HCPCS | Performed by: NURSE PRACTITIONER

## 2021-07-20 PROCEDURE — G8427 DOCREV CUR MEDS BY ELIG CLIN: HCPCS | Performed by: NURSE PRACTITIONER

## 2021-07-20 RX ORDER — UBROGEPANT 100 MG/1
100 TABLET ORAL DAILY PRN
Qty: 10 TABLET | Refills: 3 | Status: SHIPPED
Start: 2021-07-20 | End: 2022-04-25

## 2021-07-20 NOTE — PROGRESS NOTES
Anthony. Saloni Alvarenga M.D., F.A.C.P. Vitaly Serrano, DNP, APRN, CNS  Skinny Chiu. Sohail Garcia, MSN, APRN-FNP-C  Alo Barker MSN, APRN, FNP-C  Leonor LOPEZ PA-C  [address] MSN, APRN, FNP-C     [address], [address]              [place], [address] [place] [address]      [phone]                                  Flower Pruett is a 39 y.o. right handed woman    We are following her for migraines    She presents alone and remains a good historian    Unfortunately, her headaches have increased on Ajovy, and Katerin Sousa is not working as well as it did before. Another provider took her off her topiramate completely (she told me at last visit that 100 mg daily had no effect on the frequency of her migraines), and she notes worsening issues with sleep--only sleeping a few hours nightly. She is on amitriptyline 25 mg nightly. BP is also elevated today, despite taking her antihypertensives, and she notes significant stress related to family issues and her own health issues/pain. She is having persistent low back and knee pains.     Meds failed: Nurtec, Prozac, gabapentin, rizatriptan, sumatriptan    Current Outpatient Medications   Medication Sig Dispense Refill    AJOVY 225 MG/1.5ML SOAJ INJECT 225 MG INTO THE SKIN EVERY 30 DAYS 1.5 mL 2    Multiple Vitamins-Minerals (THERAPEUTIC MULTIVITAMIN-MINERALS) tablet Take 1 tablet by mouth daily      Cetirizine HCl (ZYRTEC PO) Take by mouth as needed      lidocaine (LMX) 4 % cream       SAVELLA 50 MG TABS       Ubrogepant (UBRELVY) 100 MG TABS Take 100 mg by mouth daily as needed (migraine) May repeat dose in 2 hours if first ineffective; no more than 2 doses in 24 hours 10 tablet 3    metFORMIN (GLUCOPHAGE) 500 MG tablet       levothyroxine (SYNTHROID) 150 MCG tablet       vitamin D (ERGOCALCIFEROL) 1.25 MG (28325 UT) CAPS capsule       simvastatin (ZOCOR) 5 MG tablet       sucralfate (CARAFATE) 1 GM tablet       spironolactone (ALDACTONE) 25 MG tablet Take 12.5 mg by mouth 2 times daily       Plecanatide (TRULANCE) 3 MG TABS Take by mouth daily      atenolol (TENORMIN) 25 MG tablet Take 1 tablet by mouth daily (Patient taking differently: Take 25 mg by mouth daily Takes 1 1/2 tabs) 30 tablet 0    Cholecalciferol (VITAMIN D3) 50 MCG (2000 UT) CAPS Take by mouth daily      oxyCODONE-acetaminophen (PERCOCET) 5-325 MG per tablet Take 1 tablet by mouth every 8 hours as needed for Pain.  albuterol sulfate  (90 Base) MCG/ACT inhaler Inhale 1 puff into the lungs every 4 hours as needed   5    dicyclomine (BENTYL) 20 MG tablet 1 TAB BY MOUTH TWICE A DAY 30 MIN BEFORE MEALS  3    Milnacipran HCl (SAVELLA PO) Take 50 mg by mouth 2 times daily       diclofenac sodium 1 % GEL Apply 2 g topically 4 times daily as needed for Pain Using 4 percent      tiZANidine (ZANAFLEX) 4 MG tablet Take 4 mg by mouth every 8 hours as needed       amitriptyline (ELAVIL) 25 MG tablet Take 25 mg by mouth nightly      Elastic Bandages & Supports (KNEE BRACE) MISC Hinged knee brace to left knee 1 each 0    omeprazole (PRILOSEC) 40 MG delayed release capsule Take 20 mg by mouth daily       montelukast (SINGULAIR) 10 MG tablet Take 10 mg by mouth nightly.  valACYclovir (VALTREX) 500 MG tablet Take 500 mg by mouth as needed        No current facility-administered medications for this visit.      Objective:     Initial /120    BP (!) 150/105 (Site: Right Lower Arm, Position: Sitting, Cuff Size: Medium Adult)   Pulse 99   Temp 97.7 °F (36.5 °C) (Infrared)   Ht 5' 3\" (1.6 m)   Wt 250 lb (113.4 kg)   SpO2 99%   BMI 44.29 kg/m²      General appearance: alert, appears stated age and cooperative  Head: hyperalgesia with palpation of temples, TMJ, and b/l occipital grooves; normocephalic  Eyes: sclerae/conjunctivae clear--fundi not well visualized  Neck: hyperalgesia with gentle passive ROM  Heart: RRR; no murmur  Lungs: lungs clear throughout  Extremities: no cyanosis or edema; tenderness to R knee  Pulses: 2+ and symmetric  Skin: no rashes or lesions     Mental Status: Alert, orientedx4--pleasant    Speech: clear  Language: appropriate--pressured speech at times    Cranial Nerves:  I: smell    II: visual acuity     II: visual fields Full    II: pupils GONZALO   III,VII: ptosis None   III,IV,VI: extraocular muscles  EOMI without nystagmus    V: mastication Normal   V: facial light touch sensation  Normal   V,VII: corneal reflex     VII: facial muscle function - upper     VII: facial muscle function - lower Normal   VIII: hearing Normal   IX: soft palate elevation  Normal   IX,X: gag reflex    XI: trapezius strength  5/5   XI: sternocleidomastoid strength 5/5   XI: neck extension strength  5/5   XII: tongue strength  Normal     Motor:  5/5 throughout  Oese bulk and normal tone   No drift or abnormal movements    Sensory:  LT normal    Coordination:   FN, FFM and MIGUELANGEL normal    Gait:  Antalgic    DTR:   2+ throughout    No Love's     Laboratory/Radiology:     CBC with Differential:    Lab Results   Component Value Date    WBC 6.7 05/21/2021    RBC 4.45 05/21/2021    HGB 12.5 05/21/2021    HCT 40.4 05/21/2021     05/21/2021    MCV 90.8 05/21/2021    MCH 28.1 05/21/2021    MCHC 30.9 05/21/2021    RDW 13.2 05/21/2021    SEGSPCT 68 01/16/2014    LYMPHOPCT 28.0 11/07/2020    MONOPCT 5.9 11/07/2020    BASOPCT 0.5 11/07/2020    MONOSABS 0.45 11/07/2020    LYMPHSABS 2.14 11/07/2020    EOSABS 0.20 11/07/2020    BASOSABS 0.04 11/07/2020     CMP:    Lab Results   Component Value Date     05/21/2021    K 4.1 05/21/2021     05/21/2021    CO2 23 05/21/2021    BUN 16 05/21/2021    CREATININE 1.0 05/21/2021    GFRAA >60 05/21/2021    LABGLOM >60 05/21/2021    GLUCOSE 102 05/21/2021    PROT 6.9 11/07/2020    LABALBU 3.8 11/07/2020    CALCIUM 9.6 05/21/2021    BILITOT <0.2 11/07/2020    ALKPHOS 87 11/07/2020    AST 20 11/07/2020    ALT 17 11/07/2020     I independently reviewed the labs and imaging studies today. Assessment:     Chronic migraines without aura: Increasing to over 20 HA days per month, and failing CGRP receptor antagonism and multiple first line agents as explained above. Topiramate may have been providing some HA prevention, but minimal.. She would be a good candidate for Botox therapy. Can continue Ubrelvy for now, as it has modest efficacy with her headaches--may work better when her overall frequency has reduced.  Exam is unchanged    Uncontrolled HTN    Polypharmacy    Chronic pain and opiate use    Plan:     Begin auth for Botox    Stop Ajovy; continue Ubrelvy 100 mg PRN for now    Consider restarting topiramate or Trokendi in future     She will call PCP today regarding HTN    HA diary    Lifestyle mod including exercise, weight loss, sleep hygiene    Return for first Botox treatment once approved    NADIR Zaidi CNP   8:20 AM  7/20/2021

## 2021-07-20 NOTE — TELEPHONE ENCOUNTER
[facility] Approval of Ubrelvy 100 mg - [id number], Valid 4/2/2021 - 9/29/2021  Electronically signed by Sherron Oropeza on 7/20/21 at 3:57 PM EDT

## 2021-07-20 NOTE — PATIENT INSTRUCTIONS
Patient Education        Migraine Headache: Care Instructions  Overview     Migraines are painful, throbbing headaches that often start on one side of the head. They may cause nausea and vomiting and make you sensitive to light, sound, or smell. Without treatment, migraines can last from 4 hours to a few days. Medicines can help prevent migraines or stop them after they have started. Your doctor can help you find which ones work best for you. Follow-up care is a key part of your treatment and safety. Be sure to make and go to all appointments, and call your doctor if you are having problems. It's also a good idea to know your test results and keep a list of the medicines you take. How can you care for yourself at home? · Do not drive if you have taken a prescription pain medicine. · Rest in a quiet, dark room until your headache is gone. Close your eyes, and try to relax or go to sleep. Don't watch TV or read. · Put a cold, moist cloth or cold pack on the painful area for 10 to 20 minutes at a time. Put a thin cloth between the cold pack and your skin. · Use a warm, moist towel or a heating pad set on low to relax tight shoulder and neck muscles. · Have someone gently massage your neck and shoulders. · Take your medicines exactly as prescribed. Call your doctor if you think you are having a problem with your medicine. You will get more details on the specific medicines your doctor prescribes. · Don't take medicine for headache pain too often. Talk to your doctor if you are taking medicine more than 2 days a week to stop a headache. Taking too much pain medicine can lead to more headaches. These are called medicine-overuse headaches. To prevent migraines  · Keep a headache diary so you can figure out what triggers your headaches. Avoiding triggers may help you prevent headaches. Record when each headache began, how long it lasted, and what the pain was like.  Write down any other symptoms you had with the headache, such as nausea, flashing lights or dark spots, or sensitivity to bright light or loud noise. Note if the headache occurred near your period. List anything that might have triggered the headache. Triggers may include certain foods (chocolate, cheese, wine) or odors, smoke, bright light, stress, or lack of sleep. · If your doctor has prescribed medicine for your migraines, take it as directed. You may have medicine that you take only when you get a migraine and medicine that you take all the time to help prevent migraines. ? If your doctor has prescribed medicine for when you get a headache, take it at the first sign of a migraine, unless your doctor has given you other instructions. ? If your doctor has prescribed medicine to prevent migraines, take it exactly as prescribed. Call your doctor if you think you are having a problem with your medicine. · Find healthy ways to deal with stress. Migraines are most common during or right after stressful times. Try finding ways to reduce stress like practicing mindfulness or deep breathing exercises. · Get plenty of sleep and exercise. But be careful to not push yourself too hard during exercise. It may trigger a headache. · Eat meals on a regular schedule. Avoid foods and drinks that often trigger migraines. These include chocolate, alcohol (especially red wine and port), aspartame, monosodium glutamate (MSG), and some additives found in foods (such as hot dogs, lakhani, cold cuts, aged cheeses, and pickled foods). · Limit caffeine. Don't drink too much coffee, tea, or soda. But don't quit caffeine suddenly. That can also give you migraines. · Do not smoke or allow others to smoke around you. If you need help quitting, talk to your doctor about stop-smoking programs and medicines. These can increase your chances of quitting for good.   · If you are taking birth control pills or hormone therapy, talk to your doctor about whether they are triggering your

## 2021-08-05 NOTE — PATIENT INSTRUCTIONS
Patient Education        onabotulinumtoxinA (Botox)  Pronunciation:  ON a BOT ue CHELLY num TOX in A  Brand:  Botox, Botox Cosmetic  What is the most important information I should know about Botox? The botulinum toxin contained in this medicine can spread to other body areas beyond where it was injected. This can cause serious life-threatening side effects. Call your doctor at once if you have a hoarse voice, drooping eyelids, vision problems, severe eye irritation, severe muscle weakness, loss of bladder control, or trouble breathing, talking, or swallowing. What is onabotulinumtoxinA (Botox)? Botox is used in adults to treat cervical dystonia (abnormal head position and neck pain). Botox is also used to treat muscle spasms and stiffness (spasticity) of the arms, hands, legs, and feet in adults and children at least 3years old. This medicine will not treat spasticity caused by cerebral palsy. Botox is also used to treat certain eye muscle conditions caused by nerve disorders in adults and children who are at least 15years old. This includes uncontrolled blinking or spasm of the eyelids, and a condition in which the eyes do not point in the same direction. Botox is also used in adults to treat overactive bladder and incontinence (urine leakage) that has not been helped by other medication. Botox may be used to treat incontinence caused by nerve disorders such as spinal cord injury or multiple sclerosis. Botox is also used to treat overactive bladder caused by a neurologic disorder (such as multiple sclerosis or spinal cord injury) in children at least 11years old when other medicines cannot be used or have not worked. Botox is also used in adults to prevent chronic migraine headaches that occur more than 15 days per month and last 4 hours or longer. Botox should not be used to treat a common tension headache. Botox is also used to treat severe underarm sweating (hyperhidrosis) in adults.   Botox Cosmetic is used to temporarily lessen the appearance of facial wrinkles in adults. Botox may also be used for purposes not listed in this medication guide. What should I discuss with my healthcare provider before I receive Botox? You should not be treated with Botox if you are allergic to botulinum toxin, or if you have:  · an infection in the area where the medicine will be injected; or  · (for overactive bladder and incontinence) if you have a current bladder infection or if you are unable to urinate and you do not routinely use a catheter. Tell your doctor if you have ever had:  · other botulinum toxin injections such as Dysport or Myobloc (especially in the last 4 months);  · amyotrophic lateral sclerosis (ALS, or \"Terri Gehrig's disease\");  · myasthenia gravis;  · Lambert-Eaton syndrome;  · a side effect after prior use of botulinum toxin;  · a breathing disorder such as asthma or emphysema;  · problems with swallowing;  · facial muscle weakness (droopy eyelids, weak forehead, trouble raising your eyebrows);  · a change in the normal appearance of your face;  · bleeding problems; or  · surgery (especially on your face). Botox is made from donated human plasma and may contain viruses or other infectious agents. Donated plasma is tested and treated to reduce the risk of contamination, but there is still a small possibility it could transmit disease. Ask your doctor about any possible risk. Tell your doctor if you are pregnant or breastfeeding. How is Botox given? Botox injections should be given only by a trained medical professional, even when used for cosmetic purposes. This medicine is injected into a muscle by a healthcare provider. Botox injections should be spaced at least 3 months apart. Botox injections may be given into more than one area at a time, depending on the condition being treated.   While receiving injections for eye muscle conditions, you may need to use eye drops, ointment, a special contact lens or other device to protect the surface of your eye. Follow your doctor's instructions. If you are being treated for excessive sweating, shave your underarms about 24 hours before your injection. Do not apply antiperspirant or deodorant for 24 hours before or after you receive the injection. Avoid exercise and hot foods or beverages within 30 minutes before the injection. It may take up to 2 weeks after injection before neck muscle spasm  symptoms begin to improve. You may notice the greatest improvement after 6 weeks. It may take only 1 to 3 days after injection before eye muscle spasm  symptoms begin to improve. You may notice the greatest improvement after 2 to 6 weeks. The effects of a Botox injection are temporary. Your symptoms may return completely within 3 months. After repeat injections, it may take less and less time before your symptoms return, especially if your body develops antibodies to the botulinum toxin. Do not seek botulinum toxin injections from more than one medical professional at a time. If you switch healthcare providers, tell your new provider how long it has been since your last botulinum toxin injection. Using this medication more often than prescribed will not make it more effective and may result in serious side effects. What happens if I miss a dose? Since botulinum toxin has a temporary effect and is given at widely spaced intervals, missing a dose is not likely to be harmful. What happens if I overdose? Seek emergency medical attention or call the Poison Help line at [phone]. Overdose symptoms may not appear right away, but can include muscle weakness, trouble swallowing, and weak or shallow breathing. What should I avoid after receiving Botox? Botox may impair your vision or depth perception. Avoid driving or hazardous activity until you know how this medicine will affect you.   Avoid going back to your normal physical activities too quickly after receiving an or allergy medicine;  · sleep medicine;  · an injectable antibiotic; or  · a blood thinner --warfarin, Coumadin, Jantoven. This list is not complete. Other drugs may affect Botox, including prescription and over-the-counter medicines, vitamins, and herbal products. Not all possible drug interactions are listed here. Where can I get more information? Your doctor or pharmacist can provide more information about Botox (onabotulinumtoxinA). Remember, keep this and all other medicines out of the reach of children, never share your medicines with others, and use this medication only for the indication prescribed. Every effort has been made to ensure that the information provided by [facility] is accurate, up-to-date, and complete, but no guarantee is made to that effect. Drug information contained herein may be time sensitive. [facility] information has been compiled for use by healthcare practitioners and consumers in the [place] and therefore [facility] does not warrant that uses outside of the [place] are appropriate, unless specifically indicated otherwise. [facility]'s drug information does not endorse drugs, diagnose patients or recommend therapy. [facility] drug information is an informational resource designed to assist licensed healthcare practitioners in caring for their patients and/or to serve consumers viewing this service as a supplement to, and not a substitute for, the expertise, skill, knowledge and judgment of healthcare practitioners. The absence of a warning for a given drug or drug combination in no way should be construed to indicate that the drug or drug combination is safe, effective or appropriate for any given patient. [facility] does not assume any responsibility for any aspect of healthcare administered with the aid of information [facility] provides.  The information contained herein is not intended to cover all possible uses, directions, precautions, warnings, drug interactions, allergic reactions, or adverse effects. If you have questions about the drugs you are taking, check with your doctor, nurse or pharmacist.  Copyright 6656-0834 [address]. Version: 9.01. Revision date: 2/11/2021. Care instructions adapted under license by [facility] ([facility]). If you have questions about a medical condition or this instruction, always ask your healthcare professional. [place] [address] any warranty or liability for your use of this information.

## 2021-08-05 NOTE — PROGRESS NOTES
Botulinum Toxin Injection Procedure Note    Pre-procedure Diagnosis: Chronic migraine    Indications:     Procedure Details   The risks, benefits, indications, potential complications, and alternatives were explained to the patient and informed consent obtained. The following 31 injection sites were injected with botulinum toxin:    Bilateral Corrugators: 5 units each side  Procerus: 5 units  Bilateral frontalis: 10 units each side  Bilateral temporalis: 20 units each side  Bilateral occipitalis: 15 units each side  Bilateral cervical paraspinals: 10 units each side  Bilateral trapezius: 15 units each side    Total: 155 units      Botulinum toxin Lot #: [id number]  Botulinum toxin expiration date: 4/2024  Total botox units injected: 155 units  Total botox units wasted: 45 units    The patient tolerated the procedure well. Plan:    Call with any facial muscle weakness, ptosis, neck weakness, difficulty swallowing, difficulty speaking, or diffculty breathing    Keep a careful HA diary    Return to clinic for repeat botulinum injection in 3 months.     NADIR Morejon CNP  12:31 PM

## 2021-08-06 ENCOUNTER — OFFICE VISIT (OUTPATIENT)
Dept: NEUROLOGY | Age: 41
End: 2021-08-06
Payer: COMMERCIAL

## 2021-08-06 VITALS
HEART RATE: 90 BPM | DIASTOLIC BLOOD PRESSURE: 99 MMHG | HEIGHT: 63 IN | TEMPERATURE: 97.3 F | SYSTOLIC BLOOD PRESSURE: 157 MMHG | OXYGEN SATURATION: 95 % | BODY MASS INDEX: 44.3 KG/M2 | WEIGHT: 250 LBS

## 2021-08-06 PROCEDURE — 64615 CHEMODENERV MUSC MIGRAINE: CPT | Performed by: NURSE PRACTITIONER

## 2021-10-11 ENCOUNTER — TELEPHONE (OUTPATIENT)
Dept: INFUSION THERAPY | Age: 41
End: 2021-10-11

## 2021-11-01 ENCOUNTER — OFFICE VISIT (OUTPATIENT)
Dept: NEUROLOGY | Age: 41
End: 2021-11-01
Payer: COMMERCIAL

## 2021-11-01 VITALS
BODY MASS INDEX: 44.3 KG/M2 | HEIGHT: 63 IN | TEMPERATURE: 97.8 F | SYSTOLIC BLOOD PRESSURE: 130 MMHG | OXYGEN SATURATION: 98 % | DIASTOLIC BLOOD PRESSURE: 91 MMHG | HEART RATE: 80 BPM | WEIGHT: 250 LBS

## 2021-11-01 DIAGNOSIS — G43.709 CHRONIC MIGRAINE WITHOUT AURA WITHOUT STATUS MIGRAINOSUS, NOT INTRACTABLE: Primary | ICD-10-CM

## 2021-11-01 PROCEDURE — 64615 CHEMODENERV MUSC MIGRAINE: CPT | Performed by: NURSE PRACTITIONER

## 2021-11-01 NOTE — PATIENT INSTRUCTIONS
Patient Education        onabotulinumtoxinA (Botox)  Pronunciation:  ON a BOT ue CHELLY num TOX in A  Brand:  Botox, Botox Cosmetic  What is the most important information I should know about Botox? The botulinum toxin contained in this medicine can spread to other body areas beyond where it was injected. This can cause serious life-threatening side effects. Call your doctor at once if you have a hoarse voice, drooping eyelids, vision problems, severe eye irritation, severe muscle weakness, loss of bladder control, or trouble breathing, talking, or swallowing. What is onabotulinumtoxinA (Botox)? Botox is used in adults to treat cervical dystonia (abnormal head position and neck pain). Botox is also used to treat muscle spasms and stiffness (spasticity) of the arms, hands, legs, and feet in adults and children at least 3years old. This medicine will not treat spasticity caused by cerebral palsy. Botox is also used to treat certain eye muscle conditions caused by nerve disorders in adults and children who are at least 15years old. This includes uncontrolled blinking or spasm of the eyelids, and a condition in which the eyes do not point in the same direction. Botox is also used in adults to treat overactive bladder and incontinence (urine leakage) that has not been helped by other medication. Botox may be used to treat incontinence caused by nerve disorders such as spinal cord injury or multiple sclerosis. Botox is also used to treat overactive bladder caused by a neurologic disorder (such as multiple sclerosis or spinal cord injury) in children at least 11years old when other medicines cannot be used or have not worked. Botox is also used in adults to prevent chronic migraine headaches that occur more than 15 days per month and last 4 hours or longer. Botox should not be used to treat a common tension headache. Botox is also used to treat severe underarm sweating (hyperhidrosis) in adults.   Botox Cosmetic is used to temporarily lessen the appearance of facial wrinkles in adults. Botox may also be used for purposes not listed in this medication guide. What should I discuss with my healthcare provider before I receive Botox? You should not be treated with Botox if you are allergic to botulinum toxin, or if you have:  · an infection in the area where the medicine will be injected; or  · (for overactive bladder and incontinence) if you have a current bladder infection or if you are unable to urinate and you do not routinely use a catheter. Tell your doctor if you have ever had:  · other botulinum toxin injections such as Dysport or Myobloc (especially in the last 4 months);  · amyotrophic lateral sclerosis (ALS, or \"Terri Gehrig's disease\");  · myasthenia gravis;  · Lambert-Eaton syndrome;  · a side effect after prior use of botulinum toxin;  · a breathing disorder such as asthma or emphysema;  · problems with swallowing;  · facial muscle weakness (droopy eyelids, weak forehead, trouble raising your eyebrows);  · a change in the normal appearance of your face;  · bleeding problems; or  · surgery (especially on your face). Botox is made from donated human plasma and may contain viruses or other infectious agents. Donated plasma is tested and treated to reduce the risk of contamination, but there is still a small possibility it could transmit disease. Ask your doctor about any possible risk. Tell your doctor if you are pregnant or breastfeeding. How is Botox given? Botox injections should be given only by a trained medical professional, even when used for cosmetic purposes. This medicine is injected into a muscle by a healthcare provider. Botox injections should be spaced at least 3 months apart. Botox injections may be given into more than one area at a time, depending on the condition being treated.   While receiving injections for eye muscle conditions, you may need to use eye drops, ointment, a special contact lens or other device to protect the surface of your eye. Follow your doctor's instructions. If you are being treated for excessive sweating, shave your underarms about 24 hours before your injection. Do not apply antiperspirant or deodorant for 24 hours before or after you receive the injection. Avoid exercise and hot foods or beverages within 30 minutes before the injection. It may take up to 2 weeks after injection before neck muscle spasm  symptoms begin to improve. You may notice the greatest improvement after 6 weeks. It may take only 1 to 3 days after injection before eye muscle spasm  symptoms begin to improve. You may notice the greatest improvement after 2 to 6 weeks. The effects of a Botox injection are temporary. Your symptoms may return completely within 3 months. After repeat injections, it may take less and less time before your symptoms return, especially if your body develops antibodies to the botulinum toxin. Do not seek botulinum toxin injections from more than one medical professional at a time. If you switch healthcare providers, tell your new provider how long it has been since your last botulinum toxin injection. Using this medication more often than prescribed will not make it more effective and may result in serious side effects. What happens if I miss a dose? Since botulinum toxin has a temporary effect and is given at widely spaced intervals, missing a dose is not likely to be harmful. What happens if I overdose? Seek emergency medical attention or call the Poison Help line at [phone]. Overdose symptoms may not appear right away, but can include muscle weakness, trouble swallowing, and weak or shallow breathing. What should I avoid after receiving Botox? Botox may impair your vision or depth perception. Avoid driving or hazardous activity until you know how this medicine will affect you.   Avoid going back to your normal physical activities too quickly after receiving an injection. What are the possible side effects of Botox? Get emergency medical help if you have signs of an allergic reaction:  hives, itching; wheezing, difficult breathing; feeling like you might pass out; swelling of your face, lips, tongue, or throat. The botulinum toxin contained in Botox can spread to other body areas beyond where it was injected. This has caused serious life-threatening side effects in some people receiving botulinum toxin injections, even for cosmetic purposes. Call your doctor at once if you have any of these side effects (up to several hours or several weeks after an injection):  · unusual or severe muscle weakness (especially in a body area that was not injected with the medication);  · loss of bladder control;  · hoarse voice, trouble talking or swallowing;  · drooping eyelids or eyebrows;  · vision changes, eye pain, severely dry or irritated eyes (your eyes may also be more sensitive to light);  · chest pain or pressure, pain spreading to your jaw or shoulder, irregular heartbeats;  · pain or burning when you urinate, trouble emptying your bladder;  · sore throat, cough, chest tightness, shortness of breath; or  · eyelid swelling, crusting or drainage from your eyes, problems with vision. Common side effects may include:  · painful or difficult urination;  · headache, neck pain, back pain, pain in your arms or legs;  · cold symptoms such as stuffy nose, sneezing, sore throat;  · trouble swallowing;  · fever, chills, body aches, flu symptoms;  · increased sweating in areas other than the underarms; or  · bruising, bleeding, pain, redness, or swelling where the injection was given. This is not a complete list of side effects and others may occur. Call your doctor for medical advice about side effects. You may report side effects to FDA at 9-437-FDA-8299. What other drugs will affect Botox?   Tell your doctor about all your other medicines, especially:  · a muscle relaxer;  · cold or allergy medicine;  · sleep medicine;  · an injectable antibiotic; or  · a blood thinner --warfarin, Coumadin, Jantoven. This list is not complete. Other drugs may affect Botox, including prescription and over-the-counter medicines, vitamins, and herbal products. Not all possible drug interactions are listed here. Where can I get more information? Your doctor or pharmacist can provide more information about Botox (onabotulinumtoxinA). Remember, keep this and all other medicines out of the reach of children, never share your medicines with others, and use this medication only for the indication prescribed. Every effort has been made to ensure that the information provided by [facility] is accurate, up-to-date, and complete, but no guarantee is made to that effect. Drug information contained herein may be time sensitive. [facility] information has been compiled for use by healthcare practitioners and consumers in the [place] and therefore [facility] does not warrant that uses outside of the [place] are appropriate, unless specifically indicated otherwise. [facility]'s drug information does not endorse drugs, diagnose patients or recommend therapy. [facility] drug information is an informational resource designed to assist licensed healthcare practitioners in caring for their patients and/or to serve consumers viewing this service as a supplement to, and not a substitute for, the expertise, skill, knowledge and judgment of healthcare practitioners. The absence of a warning for a given drug or drug combination in no way should be construed to indicate that the drug or drug combination is safe, effective or appropriate for any given patient. [facility] does not assume any responsibility for any aspect of healthcare administered with the aid of information [facility] provides.  The information contained herein is not intended to cover all possible uses, directions, precautions, warnings, drug interactions, allergic reactions, or adverse effects. If you have questions about the drugs you are taking, check with your doctor, nurse or pharmacist.  Copyright 9278-1210 [address]. Version: 9.01. Revision date: 2/11/2021. Care instructions adapted under license by [facility] ([facility]). If you have questions about a medical condition or this instruction, always ask your healthcare professional. [place] [address] any warranty or liability for your use of this information.

## 2021-11-01 NOTE — PROGRESS NOTES
Botulinum Toxin Injection Procedure Note    Pre-procedure Diagnosis: Chronic migraine    Indications: same    Procedure Details   The risks, benefits, indications, potential complications, and alternatives were explained to the patient and informed consent obtained. The following 31 injection sites were injected with botulinum toxin:    Bilateral Corrugators: 5 units each side  Procerus: 5 units  Bilateral frontalis: 10 units each side  Bilateral temporalis: 20 units each side  Bilateral occipitalis: 15 units each side  Bilateral cervical paraspinals: 10 units each side  Bilateral trapezius: 15 units each side    Total: 155 units      Botulinum toxin Lot #: [id number]    Botulinum toxin expiration date: exp 6/2024  Total botox units injected: 155 units  Total botox units wasted: 45 units    The patient tolerated the procedure well. Plan:    Call with any facial muscle weakness, ptosis, neck weakness, difficulty swallowing, difficulty speaking, or diffculty breathing    Keep a careful HA diary    Return to clinic for repeat botulinum injection in 3 months.     NADIR Weems - JOSE ALBERTO  9:00 AM

## 2021-12-16 ENCOUNTER — HOSPITAL ENCOUNTER (OUTPATIENT)
Dept: INFUSION THERAPY | Age: 41
Discharge: HOME | End: 2021-12-16
Payer: COMMERCIAL

## 2021-12-16 ENCOUNTER — OFFICE VISIT (OUTPATIENT)
Dept: ONCOLOGY | Age: 41
End: 2021-12-16
Payer: COMMERCIAL

## 2021-12-16 VITALS
HEART RATE: 85 BPM | DIASTOLIC BLOOD PRESSURE: 89 MMHG | WEIGHT: 250 LBS | HEIGHT: 63 IN | OXYGEN SATURATION: 99 % | TEMPERATURE: 98 F | BODY MASS INDEX: 44.3 KG/M2 | SYSTOLIC BLOOD PRESSURE: 133 MMHG

## 2021-12-16 DIAGNOSIS — D64.9 ANEMIA, UNSPECIFIED TYPE: Primary | ICD-10-CM

## 2021-12-16 DIAGNOSIS — D64.9 ANEMIA, UNSPECIFIED TYPE: ICD-10-CM

## 2021-12-16 LAB
BASOPHILS ABSOLUTE: 0.03 E9/L (ref 0–0.2)
BASOPHILS RELATIVE PERCENT: 0.7 % (ref 0–2)
CONTROL: NORMAL
EOSINOPHILS ABSOLUTE: 0.08 E9/L (ref 0.05–0.5)
EOSINOPHILS RELATIVE PERCENT: 1.9 % (ref 0–6)
FERRITIN: 12 NG/ML
FOLATE: >20 NG/ML (ref 4.8–24.2)
HCT VFR BLD CALC: 42.3 % (ref 34–48)
HEMOCCULT STL QL: NORMAL
HEMOGLOBIN: 12.8 G/DL (ref 11.5–15.5)
IMMATURE GRANULOCYTES #: 0.01 E9/L
IMMATURE GRANULOCYTES %: 0.2 % (ref 0–5)
IRON SATURATION: 14 % (ref 15–50)
IRON: 55 MCG/DL (ref 37–145)
LYMPHOCYTES ABSOLUTE: 0.95 E9/L (ref 1.5–4)
LYMPHOCYTES RELATIVE PERCENT: 23 % (ref 20–42)
MCH RBC QN AUTO: 28 PG (ref 26–35)
MCHC RBC AUTO-ENTMCNC: 30.3 % (ref 32–34.5)
MCV RBC AUTO: 92.6 FL (ref 80–99.9)
MONOCYTES ABSOLUTE: 0.24 E9/L (ref 0.1–0.95)
MONOCYTES RELATIVE PERCENT: 5.8 % (ref 2–12)
NEUTROPHILS ABSOLUTE: 2.82 E9/L (ref 1.8–7.3)
NEUTROPHILS RELATIVE PERCENT: 68.4 % (ref 43–80)
PDW BLD-RTO: 14.6 FL (ref 11.5–15)
PLATELET # BLD: 259 E9/L (ref 130–450)
PMV BLD AUTO: 12 FL (ref 7–12)
RBC # BLD: 4.57 E12/L (ref 3.5–5.5)
TOTAL IRON BINDING CAPACITY: 382 MCG/DL (ref 250–450)
VITAMIN B-12: 660 PG/ML (ref 211–946)
WBC # BLD: 4.1 E9/L (ref 4.5–11.5)

## 2021-12-16 PROCEDURE — G8484 FLU IMMUNIZE NO ADMIN: HCPCS | Performed by: INTERNAL MEDICINE

## 2021-12-16 PROCEDURE — 99205 OFFICE O/P NEW HI 60 MIN: CPT | Performed by: INTERNAL MEDICINE

## 2021-12-16 PROCEDURE — G8417 CALC BMI ABV UP PARAM F/U: HCPCS | Performed by: INTERNAL MEDICINE

## 2021-12-16 PROCEDURE — 85025 COMPLETE CBC W/AUTO DIFF WBC: CPT

## 2021-12-16 PROCEDURE — 82607 VITAMIN B-12: CPT

## 2021-12-16 PROCEDURE — 83550 IRON BINDING TEST: CPT

## 2021-12-16 PROCEDURE — 1036F TOBACCO NON-USER: CPT | Performed by: INTERNAL MEDICINE

## 2021-12-16 PROCEDURE — 82746 ASSAY OF FOLIC ACID SERUM: CPT

## 2021-12-16 PROCEDURE — 83540 ASSAY OF IRON: CPT

## 2021-12-16 PROCEDURE — 99214 OFFICE O/P EST MOD 30 MIN: CPT | Performed by: INTERNAL MEDICINE

## 2021-12-16 PROCEDURE — 36415 COLL VENOUS BLD VENIPUNCTURE: CPT

## 2021-12-16 PROCEDURE — 82728 ASSAY OF FERRITIN: CPT

## 2021-12-16 PROCEDURE — 99214 OFFICE O/P EST MOD 30 MIN: CPT

## 2021-12-16 PROCEDURE — G8427 DOCREV CUR MEDS BY ELIG CLIN: HCPCS | Performed by: INTERNAL MEDICINE

## 2021-12-16 NOTE — PROGRESS NOTES
Katherinelarissa Yañez  1980 39 y.o. Referring Physician:     PCP: NADIR De La O CNP    Vitals:    21 1103   BP: 133/89   Pulse: 85   Temp: 98 °F (36.7 °C)   SpO2: 99%        Wt Readings from Last 3 Encounters:   21 250 lb (113.4 kg)   21 250 lb (113.4 kg)   21 250 lb (113.4 kg)        Body mass index is 44.29 kg/m². Chief Complaint:   Chief Complaint   Patient presents with    New Patient     anemia         Cancer Staging  No matching staging information was found for the patient. Prior Radiation Therapy? NO    Concurrent Chemo/radiation? NO    Prior Chemotherapy? NO    Prior Hormonal Therapy? NO    Head and Neck Cancer? No, patient does NOT have HN cancer.       LMP: 2021    Age at first Menses: 15    : 5    Para: 2          Current Outpatient Medications:     Ubrogepant (UBRELVY) 100 MG TABS, Take 100 mg by mouth daily as needed (migraine) May repeat dose in 2 hours if first ineffective; no more than 2 doses in 24 hours, Disp: 10 tablet, Rfl: 3    Multiple Vitamins-Minerals (THERAPEUTIC MULTIVITAMIN-MINERALS) tablet, Take 1 tablet by mouth daily, Disp: , Rfl:     Cetirizine HCl (ZYRTEC PO), Take by mouth as needed, Disp: , Rfl:     lidocaine (LMX) 4 % cream, , Disp: , Rfl:     metFORMIN (GLUCOPHAGE) 500 MG tablet, , Disp: , Rfl:     levothyroxine (SYNTHROID) 150 MCG tablet, , Disp: , Rfl:     vitamin D (ERGOCALCIFEROL) 1.25 MG (90034 UT) CAPS capsule, , Disp: , Rfl:     simvastatin (ZOCOR) 5 MG tablet, , Disp: , Rfl:     spironolactone (ALDACTONE) 25 MG tablet, Take 12.5 mg by mouth 2 times daily , Disp: , Rfl:     Plecanatide (TRULANCE) 3 MG TABS, Take by mouth daily, Disp: , Rfl:     atenolol (TENORMIN) 25 MG tablet, Take 1 tablet by mouth daily (Patient taking differently: Take 25 mg by mouth daily Takes 1 1/2 tabs), Disp: 30 tablet, Rfl: 0    Cholecalciferol (VITAMIN D3) 50 MCG ( UT) CAPS, Take by mouth daily, Disp: , Rfl:   oxyCODONE-acetaminophen (PERCOCET) 5-325 MG per tablet, Take 1 tablet by mouth every 8 hours as needed for Pain., Disp: , Rfl:     albuterol sulfate  (90 Base) MCG/ACT inhaler, Inhale 1 puff into the lungs every 4 hours as needed , Disp: , Rfl: 5    dicyclomine (BENTYL) 20 MG tablet, 1 TAB BY MOUTH TWICE A DAY 30 MIN BEFORE MEALS, Disp: , Rfl: 3    Milnacipran HCl (SAVELLA PO), Take 50 mg by mouth 2 times daily , Disp: , Rfl:     diclofenac sodium 1 % GEL, Apply 2 g topically 4 times daily as needed for Pain Using 4 percent, Disp: , Rfl:     tiZANidine (ZANAFLEX) 4 MG tablet, Take 4 mg by mouth every 8 hours as needed Talking 3 times a day, Disp: , Rfl:     amitriptyline (ELAVIL) 25 MG tablet, Take 25 mg by mouth nightly, Disp: , Rfl:     Elastic Bandages & Supports (KNEE BRACE) MISC, Hinged knee brace to left knee, Disp: 1 each, Rfl: 0    omeprazole (PRILOSEC) 40 MG delayed release capsule, Take 20 mg by mouth daily , Disp: , Rfl:     montelukast (SINGULAIR) 10 MG tablet, Take 10 mg by mouth nightly., Disp: , Rfl:     valACYclovir (VALTREX) 500 MG tablet, Take 500 mg by mouth as needed , Disp: , Rfl:     sucralfate (CARAFATE) 1 GM tablet, , Disp: , Rfl:        Past Medical History:   Diagnosis Date    Anxiety and depression     Arthritis     Asthma     Cervical disc disease     Diabetes mellitus ([address]     diet controlled    Ectopic pregnancy     Fibromyalgia     GERD (gastroesophageal reflux disease)     Heart murmur     benign    Herpes genitalia     Hyperlipidemia     Hypertension     IBS (irritable bowel syndrome)     Migraines     Ovarian cyst     PONV (postoperative nausea and vomiting)     Stage 3 chronic kidney disease ([address]     Thyroid disease        Past Surgical History:   Procedure Laterality Date    CERVICAL FUSION  07/30/2014    anterior cervical discetomy fusion C6-C7    CERVICAL SPINE SURGERY      has a plate    COLONOSCOPY      DILATION AND CURETTAGE OF UTERUS      ECHO COMPL W DOP COLOR FLOW  08/24/2013         ENDOSCOPY, COLON, DIAGNOSTIC      KNEE ARTHROSCOPY Right 2014    KNEE ARTHROSCOPY Right 04/01/2016    Arthroscopy right knee chondroplasty synovectomy medial and lateral menisectomy    KNEE ARTHROSCOPY Left 12/16/2016    left knee arthoscopy medial meniscectomy chodroplasty synovectomy    KNEE ARTHROSCOPY Right 07/10/2020    RIGHT KNEE ARTHROSCOPY, MEDIAL MENISCECTOMY AND DEBRIDEMENT performed by Tricia Borges DO at [address] [place] [address] Right 5/26/2021    EXCISION SOFT TISSUE NEOPLASM RIGTH LEG performed by Hubert Nicolas MD at [facility] Bilateral 12/06/2016    lumbar facet #1    OTHER SURGICAL HISTORY      partial rt  fallopian tube removed    RI SHOULDER SCOPE BONE SHAVING Left 04/12/2018    LEFT SHOULDER ARTHROSCOPY, SUBACROMIAL DECOMPRESSION AND DEBRIDEMENT performed by Tricia Borges DO at [address] ARTHROSCOPY Right 01/03/2020    RIGHT SHOULDER ARTHROSCOPY, SUBACROMIAL DECOMPRESSION AND DEBRIDEMENT LABRIUM performed by Tricia Borges DO at [address] [facility] [address]         Family History   Problem Relation Age of Onset    Diabetes Mother    Alatorre Migraines Mother     Hypertension Mother     Bipolar Disorder Mother     Ovarian Cancer Sister     Bipolar Disorder Sister     Depression Sister     Mental Illness Other     Arthritis Other     Hypertension Other     No Known Problems Father        Social History     Socioeconomic History    Marital status:      Spouse name: Not on file    Number of children: Not on file    Years of education: Not on file    Highest education level: Not on file   Occupational History    Not on file   Tobacco Use    Smoking status: Never Smoker    Smokeless tobacco: Never Used   Vaping Use    Vaping Use: Never used   Substance and Sexual Activity    Alcohol use: No    Drug use: No    Sexual activity: Not on file   Other Topics Concern    Not on file   Social History Narrative    Not on file     Social Determinants of Health     Financial Resource Strain:     Difficulty of Paying Living Expenses: Not on file   Food Insecurity:     Worried About [address] in the Last Year: Not on file    Tonya of Food in the Last Year: Not on file   Transportation Needs:     Lack of Transportation (Medical): Not on file    Lack of Transportation (Non-Medical): Not on file   Physical Activity:     Days of Exercise per Week: Not on file    Minutes of Exercise per Session: Not on file   Stress:     Feeling of Stress : Not on file   Social Connections:     Frequency of Communication with Friends and Family: Not on file    Frequency of Social Gatherings with Friends and Family: Not on file    Attends Hinduism Services: Not on file    Active Member of [address] [place] [address] or Organizations: Not on file    Attends Club or Organization Meetings: Not on file    Marital Status: Not on file   Intimate Partner Violence:     Fear of Current or Ex-Partner: Not on file    Emotionally Abused: Not on file    Physically Abused: Not on file    Sexually Abused: Not on file   Housing Stability:     Unable to Pay for Housing in the Last Year: Not on file    Number of [place] in the Last Year: Not on file    Unstable Housing in the Last Year: Not on file           Occupation: homemaker  Retired:  NO          REVIEW OF SYSTEMS:    Pacemaker/Defibulator/ICD: No    Mediport: No           FALLS RISK SCREENING ASSESSMENT    Instructions:  Assess the patient and [place] the appropriate indicators that are present for fall risk identification. Total the numbers circled and assign a fall risk score from Table 2.  Reassess patient at a minimum every 12 weeks or with status change. Assessment   Date  12/16/2021     1. Mental Ability: confusion/cognitively impaired No - 0       2. Elimination Issues: incontinence, frequency No - 0       3. (Yellow sticker Level III) placed on patient chart           MALNUTRITION RISK SCREENING ASSESSMENT    Instructions:  Assess the patient and enter the appropriate indicators that are present for nutrition risk identification. Total the numbers entered and assign a risk score. Follow the appropriate action for total score listed below. Assessment   Date  12/16/2021     1. Have you lost weight without trying? 1- Yes, 0.5 kg to 5 kg     2. Have you been eating poorly because of a decreased appetite? 0- No   3. Do you have a diagnosis of head and neck cancer?       0- No                                                                                    TOTAL 0        Score of 0-1: No action  Score 2 or greater:  · For Non-Diabetic Patient: Recommend adding Ensure Enlive 2 x daily and provide patient with Ensure wellness bag with coupons  · For Diabetic Patient: Recommend adding Glucerna Shake 2 x daily and provide patient with Glucerna Wellness bag with coupons  · Route to the dietitian via Luis Ornelas RN

## 2021-12-16 NOTE — PROGRESS NOTES
kidney disease ([address]     Thyroid disease      Patient Active Problem List   Diagnosis    Asthma    Migraines    SLE (systemic lupus erythematosus) ([address]    Primary osteoarthritis of right knee    Primary osteoarthritis of left knee    Displacement of lumbar intervertebral disc    Neural foraminal stenosis of lumbar spine    Lumbar facet arthropathy    Morbid obesity ([address]    Essential hypertension    Type 2 diabetes mellitus without complication, without long-term current use of insulin ([address]    Pure hypercholesterolemia    Soft tissue neoplasm        Past Surgical History:      Procedure Laterality Date    CERVICAL FUSION  07/30/2014    anterior cervical discetomy fusion C6-C7    CERVICAL SPINE SURGERY      has a plate    COLONOSCOPY      DILATION AND CURETTAGE OF UTERUS      ECHO COMPL W DOP COLOR FLOW  08/24/2013         ENDOSCOPY, COLON, DIAGNOSTIC      KNEE ARTHROSCOPY Right 2014    KNEE ARTHROSCOPY Right 04/01/2016    Arthroscopy right knee chondroplasty synovectomy medial and lateral menisectomy    KNEE ARTHROSCOPY Left 12/16/2016    left knee arthoscopy medial meniscectomy chodroplasty synovectomy    KNEE ARTHROSCOPY Right 07/10/2020    RIGHT KNEE ARTHROSCOPY, MEDIAL MENISCECTOMY AND DEBRIDEMENT performed by Dinorah Ortiz DO at [address] [place] [address] Right 5/26/2021    EXCISION SOFT TISSUE NEOPLASM RIGTH LEG performed by Estrella Bejarano MD at [address] Bilateral 12/06/2016    lumbar facet #1    OTHER SURGICAL HISTORY      partial rt  fallopian tube removed    ND SHOULDER SCOPE BONE SHAVING Left 04/12/2018    LEFT SHOULDER ARTHROSCOPY, SUBACROMIAL DECOMPRESSION AND DEBRIDEMENT performed by Dinorah Ortiz DO at [address] ARTHROSCOPY Right 01/03/2020    RIGHT SHOULDER ARTHROSCOPY, SUBACROMIAL DECOMPRESSION AND DEBRIDEMENT LABRIUM performed by Dinorah Ortiz DO at [address] [facility] [address] Family History:  Family History   Problem Relation Age of Onset    Diabetes Mother    Ebonie Rich Migraines Mother     Hypertension Mother     Bipolar Disorder Mother     Ovarian Cancer Sister     Bipolar Disorder Sister     Depression Sister     Mental Illness Other     Arthritis Other     Hypertension Other     No Known Problems Father        Medications:  Reviewed and reconciled. Social History:  Social History     Socioeconomic History    Marital status:      Spouse name: Not on file    Number of children: Not on file    Years of education: Not on file    Highest education level: Not on file   Occupational History    Not on file   Tobacco Use    Smoking status: Never Smoker    Smokeless tobacco: Never Used   Vaping Use    Vaping Use: Never used   Substance and Sexual Activity    Alcohol use: No    Drug use: No    Sexual activity: Not on file     Comment: not assessed   Other Topics Concern    Not on file   Social History Narrative    Not on file     Social Determinants of Health     Financial Resource Strain:     Difficulty of Paying Living Expenses: Not on file   Food Insecurity:     Worried About Running Out of Food in the Last Year: Not on file    Tonya of Food in the Last Year: Not on file   Transportation Needs:     Lack of Transportation (Medical): Not on file    Lack of Transportation (Non-Medical):  Not on file   Physical Activity:     Days of Exercise per Week: Not on file    Minutes of Exercise per Session: Not on file   Stress:     Feeling of Stress : Not on file   Social Connections:     Frequency of Communication with Friends and Family: Not on file    Frequency of Social Gatherings with Friends and Family: Not on file    Attends Anabaptist Services: Not on file    Active Member of Clubs or Organizations: Not on file    Attends Club or Organization Meetings: Not on file    Marital Status: Not on file   Intimate Partner Violence:     Fear of Current or Ex-Partner: Not on file    Emotionally Abused: Not on file    Physically Abused: Not on file    Sexually Abused: Not on file   Housing Stability:     Unable to Pay for Housing in the Last Year: Not on file    Number of Places Lived in the Last Year: Not on file    Unstable Housing in the Last Year: Not on file       Allergies: Allergies   Allergen Reactions    Aspirin Anaphylaxis and Nausea Only    Mobic [Meloxicam] Other (See Comments)     \"Almost a stroke, couldn't talk, right side numbness\"    Cortisone Hives, Nausea And Vomiting and Other (See Comments)      heart rate goes up, massive migraines facial swelling      Darvocet A500 [Propoxyphene N-Acetaminophen] Hives, Nausea And Vomiting and Other (See Comments)     Massive migraines    Folic Acid Hives    Gabapentin Other (See Comments)     Dizziness    Propoxyphene Hives     hives    Tape [Adhesive Tape] Dermatitis     \"skin spots\"       Physical Exam:  /89   Pulse 85   Temp 98 °F (36.7 °C)   Ht 5' 3\" (1.6 m)   Wt 250 lb (113.4 kg)   SpO2 99%   BMI 44.29 kg/m²   GENERAL: Alert, oriented x 3, not in acute distress. HEENT: PERRLA; EOMI. Oropharynx clear. NECK: Supple. No palpable cervical or supraclavicular lymphadenopathy. LUNGS: Good air entry bilaterally. No wheezing, crackles or rhonchi. CARDIOVASCULAR: Regular rate. No murmurs, rubs or gallops. ABDOMEN: Soft. Non-tender, non-distended. Positive bowel sounds. EXTREMITIES: Without clubbing, cyanosis, or edema. NEUROLOGIC: No focal deficits. ECOG PS 0      Impression/Plan:        Mrs. Ema Fisher is a pleasant 49-year-old lady, with a past medical history significant for asthma, anxiety and depression, DM, GERD, CKD, hyperlipidemia, hypertension, and fibromyalgia, who was referred to the hematology office for evaluation of iron deficiency that was not responsive to the oral iron.   The patient's hemoglobin hematocrit are normal, but she has persistent iron deficiency, she had blood work done with Dr. Kenton Giang, was told she needed parenteral iron infusion. The patient is feeling tired, she has heavy menstrual bleeding, no melena or hematochezia. She had GI side effects with the oral iron. The patient has iron deficiency without anemia, likely secondary to heavy menstrual bleeding, she has not been responding to the oral iron, also had GI side effects from it, recommended parenteral iron infusion as she is symptomatic, the patient will have a repeat CBC done, iron studies, vitamin B12 and folate, discussed with the patient Feraheme x2 doses 1 week apart, the side effects were reviewed with the patient, the test was ordered, if positive she will need to have GI work-up done. RTC with the second dose of Feraheme. Thank you for allowing us to participate in the care of Mrs. Karma Art.     Alessandro Crawford MD   HEMATOLOGY/MEDICAL [address]  [address] [address]  [facility]: [phone]

## 2022-01-06 ENCOUNTER — HOSPITAL ENCOUNTER (OUTPATIENT)
Dept: INFUSION THERAPY | Age: 42
Discharge: HOME | End: 2022-01-06
Payer: COMMERCIAL

## 2022-01-06 ENCOUNTER — OFFICE VISIT (OUTPATIENT)
Dept: ONCOLOGY | Age: 42
End: 2022-01-06
Payer: COMMERCIAL

## 2022-01-06 VITALS — SYSTOLIC BLOOD PRESSURE: 143 MMHG | HEART RATE: 79 BPM | DIASTOLIC BLOOD PRESSURE: 94 MMHG

## 2022-01-06 VITALS
HEART RATE: 54 BPM | WEIGHT: 249 LBS | OXYGEN SATURATION: 97 % | HEIGHT: 63 IN | SYSTOLIC BLOOD PRESSURE: 143 MMHG | DIASTOLIC BLOOD PRESSURE: 100 MMHG | TEMPERATURE: 97.7 F | BODY MASS INDEX: 44.12 KG/M2

## 2022-01-06 DIAGNOSIS — D64.9 ANEMIA, UNSPECIFIED TYPE: Primary | ICD-10-CM

## 2022-01-06 PROCEDURE — 96365 THER/PROPH/DIAG IV INF INIT: CPT

## 2022-01-06 PROCEDURE — 99214 OFFICE O/P EST MOD 30 MIN: CPT | Performed by: INTERNAL MEDICINE

## 2022-01-06 PROCEDURE — G8484 FLU IMMUNIZE NO ADMIN: HCPCS | Performed by: INTERNAL MEDICINE

## 2022-01-06 PROCEDURE — G8427 DOCREV CUR MEDS BY ELIG CLIN: HCPCS | Performed by: INTERNAL MEDICINE

## 2022-01-06 PROCEDURE — 6360000002 HC RX W HCPCS: Performed by: INTERNAL MEDICINE

## 2022-01-06 PROCEDURE — 2580000003 HC RX 258: Performed by: INTERNAL MEDICINE

## 2022-01-06 PROCEDURE — 1036F TOBACCO NON-USER: CPT | Performed by: INTERNAL MEDICINE

## 2022-01-06 PROCEDURE — G8417 CALC BMI ABV UP PARAM F/U: HCPCS | Performed by: INTERNAL MEDICINE

## 2022-01-06 RX ORDER — SODIUM CHLORIDE 9 MG/ML
INJECTION, SOLUTION INTRAVENOUS CONTINUOUS
Status: CANCELLED | OUTPATIENT
Start: 2022-01-06

## 2022-01-06 RX ORDER — ONDANSETRON 2 MG/ML
8 INJECTION INTRAMUSCULAR; INTRAVENOUS
Status: CANCELLED | OUTPATIENT
Start: 2022-01-06

## 2022-01-06 RX ORDER — SODIUM CHLORIDE 0.9 % (FLUSH) 0.9 %
5-40 SYRINGE (ML) INJECTION PRN
Status: CANCELLED | OUTPATIENT
Start: 2022-01-06

## 2022-01-06 RX ORDER — ACETAMINOPHEN 325 MG/1
650 TABLET ORAL
Status: CANCELLED | OUTPATIENT
Start: 2022-01-13

## 2022-01-06 RX ORDER — SODIUM CHLORIDE 9 MG/ML
25 INJECTION, SOLUTION INTRAVENOUS PRN
Status: CANCELLED | OUTPATIENT
Start: 2022-01-13

## 2022-01-06 RX ORDER — SODIUM CHLORIDE 9 MG/ML
INJECTION, SOLUTION INTRAVENOUS CONTINUOUS
Status: CANCELLED | OUTPATIENT
Start: 2022-01-13

## 2022-01-06 RX ORDER — ALBUTEROL SULFATE 90 UG/1
4 AEROSOL, METERED RESPIRATORY (INHALATION) PRN
Status: CANCELLED | OUTPATIENT
Start: 2022-01-06

## 2022-01-06 RX ORDER — ACETAMINOPHEN 325 MG/1
650 TABLET ORAL
Status: CANCELLED | OUTPATIENT
Start: 2022-01-06

## 2022-01-06 RX ORDER — EPINEPHRINE 1 MG/ML
0.3 INJECTION, SOLUTION, CONCENTRATE INTRAVENOUS PRN
Status: CANCELLED | OUTPATIENT
Start: 2022-01-13

## 2022-01-06 RX ORDER — HEPARIN SODIUM (PORCINE) LOCK FLUSH IV SOLN 100 UNIT/ML 100 UNIT/ML
500 SOLUTION INTRAVENOUS PRN
Status: CANCELLED | OUTPATIENT
Start: 2022-01-06

## 2022-01-06 RX ORDER — ALBUTEROL SULFATE 90 UG/1
4 AEROSOL, METERED RESPIRATORY (INHALATION) PRN
Status: CANCELLED | OUTPATIENT
Start: 2022-01-13

## 2022-01-06 RX ORDER — SODIUM CHLORIDE 0.9 % (FLUSH) 0.9 %
5-40 SYRINGE (ML) INJECTION PRN
Status: CANCELLED | OUTPATIENT
Start: 2022-01-13

## 2022-01-06 RX ORDER — EPINEPHRINE 1 MG/ML
0.3 INJECTION, SOLUTION, CONCENTRATE INTRAVENOUS PRN
Status: CANCELLED | OUTPATIENT
Start: 2022-01-06

## 2022-01-06 RX ORDER — SODIUM CHLORIDE 9 MG/ML
25 INJECTION, SOLUTION INTRAVENOUS PRN
Status: CANCELLED | OUTPATIENT
Start: 2022-01-06

## 2022-01-06 RX ORDER — HEPARIN SODIUM (PORCINE) LOCK FLUSH IV SOLN 100 UNIT/ML 100 UNIT/ML
500 SOLUTION INTRAVENOUS PRN
Status: CANCELLED | OUTPATIENT
Start: 2022-01-13

## 2022-01-06 RX ORDER — DIPHENHYDRAMINE HYDROCHLORIDE 50 MG/ML
50 INJECTION INTRAMUSCULAR; INTRAVENOUS
Status: CANCELLED | OUTPATIENT
Start: 2022-01-13

## 2022-01-06 RX ORDER — DIPHENHYDRAMINE HYDROCHLORIDE 50 MG/ML
50 INJECTION INTRAMUSCULAR; INTRAVENOUS
Status: CANCELLED | OUTPATIENT
Start: 2022-01-06

## 2022-01-06 RX ORDER — SODIUM CHLORIDE 9 MG/ML
INJECTION, SOLUTION INTRAVENOUS CONTINUOUS
Status: DISCONTINUED | OUTPATIENT
Start: 2022-01-06 | End: 2022-01-07 | Stop reason: HOSPADM

## 2022-01-06 RX ORDER — ONDANSETRON 2 MG/ML
8 INJECTION INTRAMUSCULAR; INTRAVENOUS
Status: CANCELLED | OUTPATIENT
Start: 2022-01-13

## 2022-01-06 RX ADMIN — SODIUM CHLORIDE: 9 INJECTION, SOLUTION INTRAVENOUS at 15:30

## 2022-01-06 RX ADMIN — FERUMOXYTOL 510 MG: 510 INJECTION INTRAVENOUS at 15:53

## 2022-01-06 NOTE — PROGRESS NOTES
[facility] ONCOLOGY  [facility] [address]  Dept: [phone]  Loc: [phone]  Attending progress note      Reason for Visit: Iron deficiency. Referring Physician:  Alysia Halsted, APRN - CNP    PCP:  Alysia Halsted, APRN - CNP    History of Present Illness:      Mrs. Kylie De Guzman is a pleasant 44-year-old lady, with a past medical history significant for asthma, anxiety and depression, DM, GERD, CKD, hyperlipidemia, hypertension, and fibromyalgia, who was referred to the hematology office for evaluation of iron deficiency that was not responsive to the oral iron. The patient's hemoglobin hematocrit are normal, but she has persistent iron deficiency, she had blood work done with Dr. Navin Mercado, was told she needed parenteral iron infusion. The patient is feeling tired, she has heavy menstrual bleeding, no melena or hematochezia. She had GI side effects with the oral iron. Review of Systems;  CONSTITUTIONAL: No fever, chills. Good appetite, feeling tired. ENMT: Eyes: No diplopia; Nose: No epistaxis. Mouth: No sore throat. RESPIRATORY: No hemoptysis, shortness of breath, cough. CARDIOVASCULAR: No chest pain, palpitations. GASTROINTESTINAL: No nausea/vomiting, abdominal pain, diarrhea/constipation. GENITOURINARY: No dysuria, urinary frequency, hematuria. NEURO: No syncope, presyncope, pos for headache.    Remainder:  ROS NEGATIVE    Past Medical History:      Diagnosis Date    Anxiety and depression     Arthritis     Asthma     Bulging lumbar disc     Cervical disc disease     Diabetes mellitus (HCC)     diet controlled    Ectopic pregnancy     Fasciitis     Right    Fibromyalgia     GERD (gastroesophageal reflux disease)     Heart murmur     benign    Herpes genitalia     Hyperlipidemia     Hypertension     IBS (irritable bowel syndrome)     Migraines     Ovarian cyst     PONV (postoperative nausea and vomiting)     Stage 3 chronic kidney disease ([address]     Thyroid disease      Patient Active Problem List   Diagnosis    Asthma    Migraines    SLE (systemic lupus erythematosus) ([address]    Primary osteoarthritis of right knee    Primary osteoarthritis of left knee    Displacement of lumbar intervertebral disc    Neural foraminal stenosis of lumbar spine    Lumbar facet arthropathy    Morbid obesity ([address]    Essential hypertension    Type 2 diabetes mellitus without complication, without long-term current use of insulin ([address]    Pure hypercholesterolemia    Soft tissue neoplasm    Anemia        Past Surgical History:      Procedure Laterality Date    CERVICAL FUSION  07/30/2014    anterior cervical discetomy fusion C6-C7    CERVICAL SPINE SURGERY      has a plate    COLONOSCOPY      DILATION AND CURETTAGE OF UTERUS      ECHO COMPL W DOP COLOR FLOW  08/24/2013         ENDOSCOPY, COLON, DIAGNOSTIC      KNEE ARTHROSCOPY Right 2014    KNEE ARTHROSCOPY Right 04/01/2016    Arthroscopy right knee chondroplasty synovectomy medial and lateral menisectomy    KNEE ARTHROSCOPY Left 12/16/2016    left knee arthoscopy medial meniscectomy chodroplasty synovectomy    KNEE ARTHROSCOPY Right 07/10/2020    RIGHT KNEE ARTHROSCOPY, MEDIAL MENISCECTOMY AND DEBRIDEMENT performed by Yudi Gerardo DO at [address] [place] [address] Right 5/26/2021    EXCISION SOFT TISSUE NEOPLASM RIGTH LEG performed by Danitza Isabel MD at [address] [facility] [address] Bilateral 12/06/2016    lumbar facet #1    OTHER SURGICAL HISTORY      partial rt  fallopian tube removed    [place] SHOULDER SCOPE BONE SHAVING Left 04/12/2018    LEFT SHOULDER ARTHROSCOPY, SUBACROMIAL DECOMPRESSION AND DEBRIDEMENT performed by Yudi Gerardo DO at [address] ARTHROSCOPY Right 01/03/2020    RIGHT SHOULDER ARTHROSCOPY, SUBACROMIAL DECOMPRESSION AND DEBRIDEMENT LABRIUM performed by Yudi Gerardo DO at [address] [facility] TONSILLECTOMY      TUBAL LIGATION         Family History:  Family History   Problem Relation Age of Onset    Diabetes Mother    Saba Kendrick Migraines Mother     Hypertension Mother     Bipolar Disorder Mother     Ovarian Cancer Sister     Bipolar Disorder Sister     Depression Sister     Mental Illness Other     Arthritis Other     Hypertension Other     No Known Problems Father        Medications:  Reviewed and reconciled. Social History:  Social History     Socioeconomic History    Marital status:      Spouse name: Not on file    Number of children: Not on file    Years of education: Not on file    Highest education level: Not on file   Occupational History    Not on file   Tobacco Use    Smoking status: Never Smoker    Smokeless tobacco: Never Used   Vaping Use    Vaping Use: Never used   Substance and Sexual Activity    Alcohol use: No    Drug use: No    Sexual activity: Not on file     Comment: not assessed   Other Topics Concern    Not on file   Social History Narrative    Not on file     Social Determinants of Health     Financial Resource Strain:     Difficulty of Paying Living Expenses: Not on file   Food Insecurity:     Worried About Running Out of Food in the Last Year: Not on file    Tonya of Food in the Last Year: Not on file   Transportation Needs:     Lack of Transportation (Medical): Not on file    Lack of Transportation (Non-Medical):  Not on file   Physical Activity:     Days of Exercise per Week: Not on file    Minutes of Exercise per Session: Not on file   Stress:     Feeling of Stress : Not on file   Social Connections:     Frequency of Communication with Friends and Family: Not on file    Frequency of Social Gatherings with Friends and Family: Not on file    Attends Synagogue Services: Not on file    Active Member of Clubs or Organizations: Not on file    Attends Club or Organization Meetings: Not on file    Marital Status: Not on file   Intimate Partner Violence:     Fear of Current or Ex-Partner: Not on file    Emotionally Abused: Not on file    Physically Abused: Not on file    Sexually Abused: Not on file   Housing Stability:     Unable to Pay for Housing in the Last Year: Not on file    Number of Places Lived in the Last Year: Not on file    Unstable Housing in the Last Year: Not on file       Allergies: Allergies   Allergen Reactions    Aspirin Anaphylaxis and Nausea Only    Mobic [Meloxicam] Other (See Comments)     \"Almost a stroke, couldn't talk, right side numbness\"    Cortisone Hives, Nausea And Vomiting and Other (See Comments)      heart rate goes up, massive migraines facial swelling      Darvocet A500 [Propoxyphene N-Acetaminophen] Hives, Nausea And Vomiting and Other (See Comments)     Massive migraines    Folic Acid Hives    Gabapentin Other (See Comments)     Dizziness    Propoxyphene Hives     hives    Tape [Adhesive Tape] Dermatitis     \"skin spots\"       Physical Exam:  BP (!) 143/100   Pulse 54   Temp 97.7 °F (36.5 °C)   Ht 5' 3\" (1.6 m)   Wt 249 lb (112.9 kg)   SpO2 97%   BMI 44.11 kg/m²   GENERAL: Alert, oriented x 3, not in acute distress. HEENT: PERRLA; EOMI. Oropharynx clear. NECK: Supple. No palpable cervical or supraclavicular lymphadenopathy. LUNGS: Good air entry bilaterally. No wheezing, crackles or rhonchi. CARDIOVASCULAR: Regular rate. No murmurs, rubs or gallops. ABDOMEN: Soft. Non-tender, non-distended. Positive bowel sounds. EXTREMITIES: Without clubbing, cyanosis, or edema. NEUROLOGIC: No focal deficits. ECOG PS 0      Impression/Plan:        Mrs. Angela Bergeron is a pleasant 58-year-old lady, with a past medical history significant for asthma, anxiety and depression, DM, GERD, CKD, hyperlipidemia, hypertension, and fibromyalgia, who was referred to the hematology office for evaluation of iron deficiency that was not responsive to the oral iron.   The patient's hemoglobin hematocrit are normal, but she has persistent iron deficiency, she had blood work done with Dr. Karolyn Navarro, was told she needed parenteral iron infusion. The patient is feeling tired, she has heavy menstrual bleeding, no melena or hematochezia. She had GI side effects with the oral iron. The patient has iron deficiency without anemia, likely secondary to heavy menstrual bleeding, she has not been responding to the oral iron, also had GI side effects from it, recommended parenteral iron infusion as she is symptomatic, discussed with the patient Feraheme x2 doses 1 week apart, the side effects were reviewed with the patient, today 1/6/2022 proceed with Feraheme, first dose. FIT test is negative, GI w/up in 2019 was neg for bleeding, she has mild leukopenia with lymphocytopenia, likely reactive. RTC in 1 week. Thank you for allowing us to participate in the care of Mrs. Maxine Galan.     Lani Mendieta MD   HEMATOLOGY/MEDICAL [address] [address]  Dept: [place]: [phone]

## 2022-01-13 ENCOUNTER — OFFICE VISIT (OUTPATIENT)
Dept: ONCOLOGY | Age: 42
End: 2022-01-13
Payer: COMMERCIAL

## 2022-01-13 ENCOUNTER — HOSPITAL ENCOUNTER (OUTPATIENT)
Dept: INFUSION THERAPY | Age: 42
Discharge: HOME | End: 2022-01-13
Payer: COMMERCIAL

## 2022-01-13 VITALS — DIASTOLIC BLOOD PRESSURE: 74 MMHG | HEART RATE: 71 BPM | SYSTOLIC BLOOD PRESSURE: 124 MMHG

## 2022-01-13 VITALS
DIASTOLIC BLOOD PRESSURE: 97 MMHG | WEIGHT: 244.5 LBS | BODY MASS INDEX: 43.32 KG/M2 | SYSTOLIC BLOOD PRESSURE: 133 MMHG | HEART RATE: 91 BPM | TEMPERATURE: 97.5 F | OXYGEN SATURATION: 98 % | HEIGHT: 63 IN

## 2022-01-13 DIAGNOSIS — D64.9 ANEMIA, UNSPECIFIED TYPE: Primary | ICD-10-CM

## 2022-01-13 PROCEDURE — 99214 OFFICE O/P EST MOD 30 MIN: CPT | Performed by: INTERNAL MEDICINE

## 2022-01-13 PROCEDURE — 96375 TX/PRO/DX INJ NEW DRUG ADDON: CPT

## 2022-01-13 PROCEDURE — G8484 FLU IMMUNIZE NO ADMIN: HCPCS | Performed by: INTERNAL MEDICINE

## 2022-01-13 PROCEDURE — 6360000002 HC RX W HCPCS: Performed by: INTERNAL MEDICINE

## 2022-01-13 PROCEDURE — G8427 DOCREV CUR MEDS BY ELIG CLIN: HCPCS | Performed by: INTERNAL MEDICINE

## 2022-01-13 PROCEDURE — G8417 CALC BMI ABV UP PARAM F/U: HCPCS | Performed by: INTERNAL MEDICINE

## 2022-01-13 PROCEDURE — 2580000003 HC RX 258: Performed by: INTERNAL MEDICINE

## 2022-01-13 PROCEDURE — 96365 THER/PROPH/DIAG IV INF INIT: CPT

## 2022-01-13 PROCEDURE — 1036F TOBACCO NON-USER: CPT | Performed by: INTERNAL MEDICINE

## 2022-01-13 RX ORDER — ONDANSETRON 2 MG/ML
8 INJECTION INTRAMUSCULAR; INTRAVENOUS ONCE
Status: CANCELLED
Start: 2022-01-20 | End: 2022-01-20

## 2022-01-13 RX ORDER — SODIUM CHLORIDE 9 MG/ML
25 INJECTION, SOLUTION INTRAVENOUS PRN
Status: CANCELLED | OUTPATIENT
Start: 2022-01-20

## 2022-01-13 RX ORDER — ONDANSETRON 2 MG/ML
8 INJECTION INTRAMUSCULAR; INTRAVENOUS ONCE
Status: COMPLETED | OUTPATIENT
Start: 2022-01-13 | End: 2022-01-13

## 2022-01-13 RX ORDER — DIPHENHYDRAMINE HYDROCHLORIDE 50 MG/ML
50 INJECTION INTRAMUSCULAR; INTRAVENOUS
Status: CANCELLED | OUTPATIENT
Start: 2022-01-20

## 2022-01-13 RX ORDER — SODIUM CHLORIDE 9 MG/ML
INJECTION, SOLUTION INTRAVENOUS CONTINUOUS
Status: CANCELLED | OUTPATIENT
Start: 2022-01-20

## 2022-01-13 RX ORDER — ONDANSETRON 2 MG/ML
8 INJECTION INTRAMUSCULAR; INTRAVENOUS
Status: CANCELLED | OUTPATIENT
Start: 2022-01-20

## 2022-01-13 RX ORDER — ALBUTEROL SULFATE 90 UG/1
4 AEROSOL, METERED RESPIRATORY (INHALATION) PRN
Status: CANCELLED | OUTPATIENT
Start: 2022-01-20

## 2022-01-13 RX ORDER — SODIUM CHLORIDE 9 MG/ML
INJECTION, SOLUTION INTRAVENOUS CONTINUOUS
Status: DISCONTINUED | OUTPATIENT
Start: 2022-01-13 | End: 2022-01-13

## 2022-01-13 RX ORDER — ONDANSETRON HYDROCHLORIDE 8 MG/1
8 TABLET, FILM COATED ORAL EVERY 8 HOURS PRN
Qty: 30 TABLET | Refills: 0 | Status: SHIPPED | OUTPATIENT
Start: 2022-01-13

## 2022-01-13 RX ORDER — SODIUM CHLORIDE 0.9 % (FLUSH) 0.9 %
5-40 SYRINGE (ML) INJECTION PRN
Status: CANCELLED | OUTPATIENT
Start: 2022-01-20

## 2022-01-13 RX ORDER — ACETAMINOPHEN 325 MG/1
650 TABLET ORAL
Status: CANCELLED | OUTPATIENT
Start: 2022-01-20

## 2022-01-13 RX ORDER — EPINEPHRINE 1 MG/ML
0.3 INJECTION, SOLUTION, CONCENTRATE INTRAVENOUS PRN
Status: CANCELLED | OUTPATIENT
Start: 2022-01-20

## 2022-01-13 RX ORDER — HEPARIN SODIUM (PORCINE) LOCK FLUSH IV SOLN 100 UNIT/ML 100 UNIT/ML
500 SOLUTION INTRAVENOUS PRN
Status: CANCELLED | OUTPATIENT
Start: 2022-01-20

## 2022-01-13 RX ADMIN — FERUMOXYTOL 510 MG: 510 INJECTION INTRAVENOUS at 11:28

## 2022-01-13 RX ADMIN — SODIUM CHLORIDE: 9 INJECTION, SOLUTION INTRAVENOUS at 11:21

## 2022-01-13 RX ADMIN — ONDANSETRON 8 MG: 2 INJECTION INTRAMUSCULAR; INTRAVENOUS at 11:21

## 2022-01-13 NOTE — PROGRESS NOTES
[facility] ONCOLOGY  [facility] [address]  Dept: [phone]  Loc: [phone]  Attending progress note      Reason for Visit: Iron deficiency. Referring Physician:  NADIR Moore CNP    PCP:  NADIR Moore CNP    History of Present Illness:      Mrs. Cathy Cortes is a pleasant 66-year-old lady, with a past medical history significant for asthma, anxiety and depression, DM, GERD, CKD, hyperlipidemia, hypertension, and fibromyalgia, who was referred to the hematology office for evaluation of iron deficiency that was not responsive to the oral iron. The patient's hemoglobin hematocrit are normal, but she has persistent iron deficiency, she had blood work done with Dr. Georgette Luna, was told she needed parenteral iron infusion. The patient is feeling tired, she has heavy menstrual bleeding, no melena or hematochezia. She had GI side effects with the oral iron. The patient received the first dose of Feraheme on 1/6/2022, she had nausea for 2 days after receiving the infusion. She is feeling well at this time    Review of Systems;  CONSTITUTIONAL: No fever, chills. Good appetite, feeling tired. ENMT: Eyes: No diplopia; Nose: No epistaxis. Mouth: No sore throat. RESPIRATORY: No hemoptysis, shortness of breath, cough. CARDIOVASCULAR: No chest pain, palpitations. GASTROINTESTINAL: Positive for nausea, no abdominal pain, diarrhea/constipation. GENITOURINARY: No dysuria, urinary frequency, hematuria. NEURO: No syncope, presyncope, pos for headache.    Remainder:  ROS NEGATIVE    Past Medical History:      Diagnosis Date    Anxiety and depression     Arthritis     Asthma     Bulging lumbar disc     Cervical disc disease     Diabetes mellitus (HCC)     diet controlled    Ectopic pregnancy     Fasciitis     Right    Fibromyalgia     GERD (gastroesophageal reflux disease)     Heart murmur     benign    Herpes genitalia     Hyperlipidemia     Hypertension     IBS (irritable bowel syndrome)     Migraines     Ovarian cyst     PONV (postoperative nausea and vomiting)     Stage 3 chronic kidney disease ([address]     Thyroid disease      Patient Active Problem List   Diagnosis    Asthma    Migraines    SLE (systemic lupus erythematosus) (HCC)    Primary osteoarthritis of right knee    Primary osteoarthritis of left knee    Displacement of lumbar intervertebral disc    Neural foraminal stenosis of lumbar spine    Lumbar facet arthropathy    Morbid obesity ([address]    Essential hypertension    Type 2 diabetes mellitus without complication, without long-term current use of insulin ([address]    Pure hypercholesterolemia    Soft tissue neoplasm    Anemia        Past Surgical History:      Procedure Laterality Date    CERVICAL FUSION  07/30/2014    anterior cervical discetomy fusion C6-C7    CERVICAL SPINE SURGERY      has a plate    COLONOSCOPY      DILATION AND CURETTAGE OF UTERUS      ECHO COMPL W DOP COLOR FLOW  08/24/2013         ENDOSCOPY, COLON, DIAGNOSTIC      KNEE ARTHROSCOPY Right 2014    KNEE ARTHROSCOPY Right 04/01/2016    Arthroscopy right knee chondroplasty synovectomy medial and lateral menisectomy    KNEE ARTHROSCOPY Left 12/16/2016    left knee arthoscopy medial meniscectomy chodroplasty synovectomy    KNEE ARTHROSCOPY Right 07/10/2020    RIGHT KNEE ARTHROSCOPY, MEDIAL MENISCECTOMY AND DEBRIDEMENT performed by Naren Amor DO at [address] [place] [address] Right 5/26/2021    EXCISION SOFT TISSUE NEOPLASM RIGTH LEG performed by Jamal Koch MD at [address] Bilateral 12/06/2016    lumbar facet #1    OTHER SURGICAL HISTORY      partial rt  fallopian tube removed    NH SHOULDER SCOPE BONE SHAVING Left 04/12/2018    LEFT SHOULDER ARTHROSCOPY, SUBACROMIAL DECOMPRESSION AND DEBRIDEMENT performed by Naren Amor DO at [address] [facility] ARTHROSCOPY Right 01/03/2020    RIGHT SHOULDER ARTHROSCOPY, SUBACROMIAL DECOMPRESSION AND DEBRIDEMENT LABRIUM performed by Paramjit Otoole DO at [address]         Family History:  Family History   Problem Relation Age of Onset    Diabetes Mother    Carlton Outhouse Migraines Mother     Hypertension Mother     Bipolar Disorder Mother     Ovarian Cancer Sister     Bipolar Disorder Sister     Depression Sister     Mental Illness Other     Arthritis Other     Hypertension Other     No Known Problems Father        Medications:  Reviewed and reconciled. Social History:  Social History     Socioeconomic History    Marital status:      Spouse name: Not on file    Number of children: Not on file    Years of education: Not on file    Highest education level: Not on file   Occupational History    Not on file   Tobacco Use    Smoking status: Never Smoker    Smokeless tobacco: Never Used   Vaping Use    Vaping Use: Never used   Substance and Sexual Activity    Alcohol use: No    Drug use: No    Sexual activity: Not on file     Comment: not assessed   Other Topics Concern    Not on file   Social History Narrative    Not on file     Social Determinants of Health     Financial Resource Strain:     Difficulty of Paying Living Expenses: Not on file   Food Insecurity:     Worried About Running Out of Food in the Last Year: Not on file    Tonya of Food in the Last Year: Not on file   Transportation Needs:     Lack of Transportation (Medical): Not on file    Lack of Transportation (Non-Medical):  Not on file   Physical Activity:     Days of Exercise per Week: Not on file    Minutes of Exercise per Session: Not on file   Stress:     Feeling of Stress : Not on file   Social Connections:     Frequency of Communication with Friends and Family: Not on file    Frequency of Social Gatherings with Friends and Family: Not on file    Attends Church Services: Not on file   Carlton Rose Active Member of Clubs or Organizations: Not on file    Attends Club or Organization Meetings: Not on file    Marital Status: Not on file   Intimate Partner Violence:     Fear of Current or Ex-Partner: Not on file    Emotionally Abused: Not on file    Physically Abused: Not on file    Sexually Abused: Not on file   Housing Stability:     Unable to Pay for Housing in the Last Year: Not on file    Number of [place] in the Last Year: Not on file    Unstable Housing in the Last Year: Not on file       Allergies: Allergies   Allergen Reactions    Aspirin Anaphylaxis and Nausea Only    Mobic [Meloxicam] Other (See Comments)     \"Almost a stroke, couldn't talk, right side numbness\"    Cortisone Hives, Nausea And Vomiting and Other (See Comments)      heart rate goes up, massive migraines facial swelling      Darvocet A500 [Propoxyphene N-Acetaminophen] Hives, Nausea And Vomiting and Other (See Comments)     Massive migraines    Folic Acid Hives    Gabapentin Other (See Comments)     Dizziness    Propoxyphene Hives     hives    Tape [Adhesive Tape] Dermatitis     \"skin spots\"       Physical Exam:  BP (!) 133/97   Pulse 91   Temp 97.5 °F (36.4 °C)   Ht 5' 3\" (1.6 m)   Wt 244 lb 8 oz (110.9 kg)   SpO2 98%   BMI 43.31 kg/m²   GENERAL: Alert, oriented x 3, not in acute distress. HEENT: PERRLA; EOMI. Oropharynx clear. NECK: Supple. No palpable cervical or supraclavicular lymphadenopathy. LUNGS: Good air entry bilaterally. No wheezing, crackles or rhonchi. CARDIOVASCULAR: Regular rate. No murmurs, rubs or gallops. ABDOMEN: Soft. Non-tender, non-distended. Positive bowel sounds. EXTREMITIES: Without clubbing, cyanosis, or edema. NEUROLOGIC: No focal deficits. ECOG PS 0      Impression/Plan:        Mrs. China Wong is a pleasant 80-year-old lady, with a past medical history significant for asthma, anxiety and depression, DM, GERD, CKD, hyperlipidemia, hypertension, and fibromyalgia, who was referred to the hematology office for evaluation of iron deficiency that was not responsive to the oral iron. The patient's hemoglobin hematocrit are normal, but she has persistent iron deficiency, she had blood work done with Dr. Arlene Patel, was told she needed parenteral iron infusion. The patient is feeling tired, she has heavy menstrual bleeding, no melena or hematochezia. She had GI side effects with the oral iron. The patient has iron deficiency without anemia, likely secondary to heavy menstrual bleeding, she has not been responding to the oral iron, also had GI side effects from it, recommended parenteral iron infusion as she is symptomatic, discussed with the patient Feraheme x2 doses 1 week apart, the side effects were reviewed with the patient, The patient received the first dose of Feraheme on 1/6/2022, she had nausea for 2 days after receiving the infusion. The patient will receive IV Zofran with her infusion today, prescribed oral Zofran to take as needed. FIT test is negative, GI w/up in 2019 was neg for bleeding, she has mild leukopenia with lymphocytopenia, likely reactive. We will monitor her CBCD and iron studies. RTC in about a month    Thank you for allowing us to participate in the care of Mrs. Brett Plaza.     Madie Long MD   HEMATOLOGY/MEDICAL [address] [address]  Dept: [place]: [phone]

## 2022-01-26 NOTE — PROGRESS NOTES
Botulinum Toxin Injection Procedure Note    Pre-procedure Diagnosis: Chronic migraine    Indications: same    Procedure Details   The risks, benefits, indications, potential complications, and alternatives were explained to the patient and informed consent obtained. The following 31 injection sites were injected with botulinum toxin:    Bilateral Corrugators: 5 units each side  Procerus: 5 units  Bilateral frontalis: 10 units each side  Bilateral temporalis: 20 units each side  Bilateral occipitalis: 15 units each side  Bilateral cervical paraspinals: 10 units each side  Bilateral trapezius: 15 units each side    Total: 155 units      Botulinum toxin Lot #: [id number]  Botulinum toxin expiration date: 9/2024  Total botox units injected: 155 units  Total botox units wasted: 45 units    The patient tolerated the procedure well. Plan:    Call with any facial muscle weakness, ptosis, neck weakness, difficulty swallowing, difficulty speaking, or diffculty breathing    Keep a careful HA diary    Return to clinic for repeat botulinum injection in 3 months.     NADIR Queen CNP, [facility]  2:34 PM

## 2022-01-26 NOTE — PROGRESS NOTES
Anthony. Dontrell Pablo M.D., F.A.C.P. Lidia Kussmaul, DNP, APRN, CNS  Shawna Funez. Milena Nation, MSN, APRN-FNP-C  Prabhjot Zuniga MSN, APRN, FNP-C  Elora Frankel MSPAS, PAPageC  John Jhon MSN, APRN, FNP-C     [address]      [phone]                                  Gt Guzmán is a 39 y.o. right handed woman    We are following her for migraines    She presents alone and remains a good historian    Today she will be receiving her third Botox treatment, and she has noticed an over 50% reduction in her migraines. She is having about 10/month that do respond to one dose of Ubrelvy. She is overall pleased with her progress. She remains on amitriptyline as well    She also received an iron infusion for her anemia. She has lost over 20 pounds. She continues to have back, neck, and knee pains.     Meds failed: Nurtec, Prozac, gabapentin, rizatriptan, sumatriptan, topiramate, Ajovy    No chest pain or palpitations  No SOB  No vertigo, lightheadedness or loss of consciousness  No falls, tripping or stumbling  No incontinence of bowels or bladder  No itching or bruising appreciated  No numbness, tingling or focal arm/leg weakness  No speech or swallowing troubles    ROS otherwise negative     Current Outpatient Medications   Medication Sig Dispense Refill    ondansetron (ZOFRAN) 8 MG tablet Take 1 tablet by mouth every 8 hours as needed for Nausea or Vomiting 30 tablet 0    Ubrogepant (UBRELVY) 100 MG TABS Take 100 mg by mouth daily as needed (migraine) May repeat dose in 2 hours if first ineffective; no more than 2 doses in 24 hours 10 tablet 3    Multiple Vitamins-Minerals (THERAPEUTIC MULTIVITAMIN-MINERALS) tablet Take 1 tablet by mouth daily      Cetirizine HCl (ZYRTEC PO) Take by mouth as needed      lidocaine (LMX) 4 % cream       metFORMIN (GLUCOPHAGE) 500 MG tablet Take 500 mg by mouth daily (with breakfast)  levothyroxine (SYNTHROID) 150 MCG tablet Take by mouth Daily       vitamin D (ERGOCALCIFEROL) 1.25 MG (59501 UT) CAPS capsule once a week       simvastatin (ZOCOR) 5 MG tablet       spironolactone (ALDACTONE) 25 MG tablet Take 12.5 mg by mouth 2 times daily       Plecanatide (TRULANCE) 3 MG TABS Take by mouth daily       atenolol (TENORMIN) 25 MG tablet Take 1 tablet by mouth daily (Patient taking differently: Take 25 mg by mouth daily Takes 1 1/2 tabs) 30 tablet 0    Cholecalciferol (VITAMIN D3) 50 MCG (2000 UT) CAPS Take by mouth daily      oxyCODONE-acetaminophen (PERCOCET) 5-325 MG per tablet Take 1 tablet by mouth every 8 hours as needed for Pain.  albuterol sulfate  (90 Base) MCG/ACT inhaler Inhale 1 puff into the lungs every 4 hours as needed   5    dicyclomine (BENTYL) 20 MG tablet 1 TAB BY MOUTH TWICE A DAY 30 MIN BEFORE MEALS  3    Milnacipran HCl (SAVELLA PO) Take 50 mg by mouth 2 times daily       diclofenac sodium 1 % GEL Apply 2 g topically 4 times daily as needed for Pain Using 4 percent      tiZANidine (ZANAFLEX) 4 MG tablet Take 4 mg by mouth every 8 hours as needed Talking 3 times a day      amitriptyline (ELAVIL) 25 MG tablet Take 25 mg by mouth nightly      Elastic Bandages & Supports (KNEE BRACE) MISC Hinged knee brace to left knee 1 each 0    omeprazole (PRILOSEC) 40 MG delayed release capsule Take 20 mg by mouth daily       montelukast (SINGULAIR) 10 MG tablet Take 10 mg by mouth nightly.       valACYclovir (VALTREX) 500 MG tablet Take 500 mg by mouth as needed        Current Facility-Administered Medications   Medication Dose Route Frequency Provider Last Rate Last Admin    Onabotulinumtoxin A (BOTOX (COSMETIC)) injection 155 Units  155 Units IntraMUSCular Once [place] NADIR Meredith - JOSE ALBERTO         Objective:     125/90, P86, T97.1, RR 20     General appearance: alert, appears stated age and cooperative  Head: Mild hyperalgesia with palpation of temples, TMJ, and b/l occipital grooves; normocephalic  Eyes: sclerae/conjunctivae clear--fundi not well visualized  Neck: hyperalgesia with gentle passive ROM  Heart: RRR; no murmur  Lungs: lungs clear throughout  Extremities: no cyanosis or edema; tenderness to both knee  Pulses: 2+ and symmetric  Skin: no rashes or lesions     Mental Status: Alert, oriented x4--pleasant    Appropriate attention and concentration  Intact memories and fundus of knowledge    Speech: clear  Language: appropriate    Cranial Nerves:  I: smell    II: visual acuity     II: visual fields Full    II: pupils GONZALO   III,VII: ptosis None   III,IV,VI: extraocular muscles  EOMI without nystagmus    V: mastication Normal   V: facial light touch sensation  Normal   V,VII: corneal reflex     VII: facial muscle function - upper     VII: facial muscle function - lower Normal   VIII: hearing Normal   IX: soft palate elevation  Normal   IX,X: gag reflex    XI: trapezius strength  5/5   XI: sternocleidomastoid strength 5/5   XI: neck extension strength  5/5   XII: tongue strength  Normal     Motor:  5/5 throughout  Oese bulk and normal tone   No drift or abnormal movements    Sensory:  LT normal    Coordination:   FN, FFM and MIGUELANGEL normal    Gait:  Antalgic    DTR:   2+ throughout--deferred at left knee due to pain    No Love's     Laboratory/Radiology:     CBC with Differential:    Lab Results   Component Value Date    WBC 4.1 12/16/2021    RBC 4.57 12/16/2021    HGB 12.8 12/16/2021    HCT 42.3 12/16/2021     12/16/2021    MCV 92.6 12/16/2021    MCH 28.0 12/16/2021    MCHC 30.3 12/16/2021    RDW 14.6 12/16/2021    SEGSPCT 68 01/16/2014    LYMPHOPCT 23.0 12/16/2021    MONOPCT 5.8 12/16/2021    BASOPCT 0.7 12/16/2021    MONOSABS 0.24 12/16/2021    LYMPHSABS 0.95 12/16/2021    EOSABS 0.08 12/16/2021    BASOSABS 0.03 12/16/2021     I independently reviewed the labs and imaging studies today.      Assessment:     Chronic migraines without aura: 50% reduction in headache frequency with Botox treatments and we will continue. [facility] [place] and [place] is effective for headache rescue and her overall quality of life has improved.   Her exam is unchanged    Plan:     Continue Botox    Continue Ubrelvy 100 mg PRN     Continue weight loss    HA diary    RTO in 6 mos or sooner PRN    NADIR Andrade - CNP, [facility]  2:07 PM  1/26/2022

## 2022-01-27 ENCOUNTER — OFFICE VISIT (OUTPATIENT)
Dept: NEUROLOGY | Age: 42
End: 2022-01-27
Payer: COMMERCIAL

## 2022-01-27 VITALS
HEART RATE: 86 BPM | RESPIRATION RATE: 18 BRPM | WEIGHT: 240 LBS | TEMPERATURE: 97.1 F | SYSTOLIC BLOOD PRESSURE: 125 MMHG | DIASTOLIC BLOOD PRESSURE: 90 MMHG | OXYGEN SATURATION: 99 % | BODY MASS INDEX: 42.52 KG/M2 | HEIGHT: 63 IN

## 2022-01-27 DIAGNOSIS — G43.709 CHRONIC MIGRAINE WITHOUT AURA WITHOUT STATUS MIGRAINOSUS, NOT INTRACTABLE: Primary | ICD-10-CM

## 2022-01-27 PROCEDURE — G8417 CALC BMI ABV UP PARAM F/U: HCPCS | Performed by: NURSE PRACTITIONER

## 2022-01-27 PROCEDURE — 99213 OFFICE O/P EST LOW 20 MIN: CPT | Performed by: NURSE PRACTITIONER

## 2022-01-27 PROCEDURE — G8484 FLU IMMUNIZE NO ADMIN: HCPCS | Performed by: NURSE PRACTITIONER

## 2022-01-27 PROCEDURE — 1036F TOBACCO NON-USER: CPT | Performed by: NURSE PRACTITIONER

## 2022-01-27 PROCEDURE — 64615 CHEMODENERV MUSC MIGRAINE: CPT | Performed by: NURSE PRACTITIONER

## 2022-01-27 PROCEDURE — G8427 DOCREV CUR MEDS BY ELIG CLIN: HCPCS | Performed by: NURSE PRACTITIONER

## 2022-01-27 NOTE — PATIENT INSTRUCTIONS
Patient Education        onabotulinumtoxinA (Botox)  Pronunciation:  ON a BOT ue CHELLY num TOX in A  Brand:  Botox, Botox Cosmetic  What is the most important information I should know about Botox? The botulinum toxin contained in this medicine can spread to other body areas beyond where it was injected. This can cause serious life-threatening side effects. Call your doctor at once if you have a hoarse voice, drooping eyelids, vision problems, severe eye irritation, severe muscle weakness, loss of bladder control, or trouble breathing, talking, or swallowing. What is onabotulinumtoxinA (Botox)? Botox is used in adults to treat cervical dystonia (abnormal head position and neck pain). Botox is also used to treat muscle spasms and stiffness (spasticity) of the arms, hands, legs, and feet in adults and children at least 3years old. This medicine will not treat spasticity caused by cerebral palsy. Botox is also used to treat certain eye muscle conditions caused by nerve disorders in adults and children who are at least 15years old. This includes uncontrolled blinking or spasm of the eyelids, and a condition in which the eyes do not point in the same direction. Botox is also used in adults to treat overactive bladder and incontinence (urine leakage) that has not been helped by other medication. Botox may be used to treat incontinence caused by nerve disorders such as spinal cord injury or multiple sclerosis. Botox is also used to treat overactive bladder caused by a neurologic disorder (such as multiple sclerosis or spinal cord injury) in children at least 11years old when other medicines cannot be used or have not worked. Botox is also used in adults to prevent chronic migraine headaches that occur more than 15 days per month and last 4 hours or longer. Botox should not be used to treat a common tension headache. Botox is also used to treat severe underarm sweating (hyperhidrosis) in adults.   Botox Cosmetic is used to temporarily lessen the appearance of facial wrinkles in adults. Botox may also be used for purposes not listed in this medication guide. What should I discuss with my healthcare provider before I receive Botox? You should not be treated with Botox if you are allergic to botulinum toxin, or if you have:  · an infection in the area where the medicine will be injected; or  · (for overactive bladder and incontinence) if you have a current bladder infection or if you are unable to urinate and you do not routinely use a catheter. Tell your doctor if you have ever had:  · other botulinum toxin injections such as Dysport or Myobloc (especially in the last 4 months);  · amyotrophic lateral sclerosis (ALS, or \"Terri Gehrig's disease\");  · myasthenia gravis;  · Lambert-Eaton syndrome;  · a side effect after prior use of botulinum toxin;  · a breathing disorder such as asthma or emphysema;  · problems with swallowing;  · facial muscle weakness (droopy eyelids, weak forehead, trouble raising your eyebrows);  · a change in the normal appearance of your face;  · bleeding problems; or  · surgery (especially on your face). Botox is made from donated human plasma and may contain viruses or other infectious agents. Donated plasma is tested and treated to reduce the risk of contamination, but there is still a small possibility it could transmit disease. Ask your doctor about any possible risk. Tell your doctor if you are pregnant or breastfeeding. How is Botox given? Botox injections should be given only by a trained medical professional, even when used for cosmetic purposes. This medicine is injected into a muscle by a healthcare provider. Botox injections should be spaced at least 3 months apart. Botox injections may be given into more than one area at a time, depending on the condition being treated.   While receiving injections for eye muscle conditions, you may need to use eye drops, ointment, a special contact lens or other device to protect the surface of your eye. Follow your doctor's instructions. If you are being treated for excessive sweating, shave your underarms about 24 hours before your injection. Do not apply antiperspirant or deodorant for 24 hours before or after you receive the injection. Avoid exercise and hot foods or beverages within 30 minutes before the injection. It may take up to 2 weeks after injection before neck muscle spasm  symptoms begin to improve. You may notice the greatest improvement after 6 weeks. It may take only 1 to 3 days after injection before eye muscle spasm  symptoms begin to improve. You may notice the greatest improvement after 2 to 6 weeks. The effects of a Botox injection are temporary. Your symptoms may return completely within 3 months. After repeat injections, it may take less and less time before your symptoms return, especially if your body develops antibodies to the botulinum toxin. Do not seek botulinum toxin injections from more than one medical professional at a time. If you switch healthcare providers, tell your new provider how long it has been since your last botulinum toxin injection. Using this medication more often than prescribed will not make it more effective and may result in serious side effects. What happens if I miss a dose? Since botulinum toxin has a temporary effect and is given at widely spaced intervals, missing a dose is not likely to be harmful. What happens if I overdose? Seek emergency medical attention or call the Poison Help line at [phone]. Overdose symptoms may not appear right away, but can include muscle weakness, trouble swallowing, and weak or shallow breathing. What should I avoid after receiving Botox? Botox may impair your vision or depth perception. Avoid driving or hazardous activity until you know how this medicine will affect you.   Avoid going back to your normal physical activities too quickly after receiving an injection. What are the possible side effects of Botox? Get emergency medical help if you have signs of an allergic reaction:  hives, itching; wheezing, difficult breathing; feeling like you might pass out; swelling of your face, lips, tongue, or throat. The botulinum toxin contained in Botox can spread to other body areas beyond where it was injected. This has caused serious life-threatening side effects in some people receiving botulinum toxin injections, even for cosmetic purposes. Call your doctor at once if you have any of these side effects (up to several hours or several weeks after an injection):  · unusual or severe muscle weakness (especially in a body area that was not injected with the medication);  · loss of bladder control;  · hoarse voice, trouble talking or swallowing;  · drooping eyelids or eyebrows;  · vision changes, eye pain, severely dry or irritated eyes (your eyes may also be more sensitive to light);  · chest pain or pressure, pain spreading to your jaw or shoulder, irregular heartbeats;  · pain or burning when you urinate, trouble emptying your bladder;  · sore throat, cough, chest tightness, shortness of breath; or  · eyelid swelling, crusting or drainage from your eyes, problems with vision. Common side effects may include:  · painful or difficult urination;  · headache, neck pain, back pain, pain in your arms or legs;  · cold symptoms such as stuffy nose, sneezing, sore throat;  · trouble swallowing;  · fever, chills, body aches, flu symptoms;  · increased sweating in areas other than the underarms; or  · bruising, bleeding, pain, redness, or swelling where the injection was given. This is not a complete list of side effects and others may occur. Call your doctor for medical advice about side effects. You may report side effects to FDA at 8-062-FDA-1334. What other drugs will affect Botox?   Tell your doctor about all your other medicines, especially:  · a muscle relaxer;  · cold or allergy medicine;  · sleep medicine;  · an injectable antibiotic; or  · a blood thinner --warfarin, Coumadin, Jantoven. This list is not complete. Other drugs may affect Botox, including prescription and over-the-counter medicines, vitamins, and herbal products. Not all possible drug interactions are listed here. Where can I get more information? Your doctor or pharmacist can provide more information about Botox (onabotulinumtoxinA). Remember, keep this and all other medicines out of the reach of children, never share your medicines with others, and use this medication only for the indication prescribed. Every effort has been made to ensure that the information provided by [facility] is accurate, up-to-date, and complete, but no guarantee is made to that effect. Drug information contained herein may be time sensitive. [facility] information has been compiled for use by healthcare practitioners and consumers in the [place] and therefore [facility] does not warrant that uses outside of the [place] are appropriate, unless specifically indicated otherwise. [facility]'s drug information does not endorse drugs, diagnose patients or recommend therapy. [facility] drug information is an informational resource designed to assist licensed healthcare practitioners in caring for their patients and/or to serve consumers viewing this service as a supplement to, and not a substitute for, the expertise, skill, knowledge and judgment of healthcare practitioners. The absence of a warning for a given drug or drug combination in no way should be construed to indicate that the drug or drug combination is safe, effective or appropriate for any given patient. [facility] does not assume any responsibility for any aspect of healthcare administered with the aid of information [facility] provides.  The information contained herein is not intended to cover all possible uses, directions, precautions, warnings, drug interactions, allergic reactions, or adverse effects. If you have questions about the drugs you are taking, check with your doctor, nurse or pharmacist.  Copyright 0484-6745 [address]. Version: 9.01. Revision date: 2/11/2021. Care instructions adapted under license by [facility] [place] ([facility]). If you have questions about a medical condition or this instruction, always ask your healthcare professional. Hillary Reich any warranty or liability for your use of this information.

## 2022-01-27 NOTE — PATIENT INSTRUCTIONS
Patient Education         Headaches: Keeping a Diary (01:31)  Your health professional recommends that you watch this short online health video. Learn how keeping a headache diary can help you find what's causing your pain. Purpose:  Outlines what to include in a headache diary and how tracking headaches and possible triggers can help inform treatment. Goal:  The user will learn how to keep a headache diary to help identify headache triggers. How to watch the video    Scan the QR code   OR Visit the website    [email or web address]   Current as of: April 8, 2021               Content Version: 13.1  © 6258-6627 [employer]. Care instructions adapted under license by [facility] ([facility]). If you have questions about a medical condition or this instruction, always ask your healthcare professional. [address] any warranty or liability for your use of this information.

## 2022-02-07 DIAGNOSIS — M25.562 ACUTE PAIN OF LEFT KNEE: Primary | ICD-10-CM

## 2022-02-08 ENCOUNTER — OFFICE VISIT (OUTPATIENT)
Dept: ORTHOPEDIC SURGERY | Age: 42
End: 2022-02-08
Payer: COMMERCIAL

## 2022-02-08 VITALS — WEIGHT: 240 LBS | HEIGHT: 63 IN | BODY MASS INDEX: 42.52 KG/M2 | TEMPERATURE: 98 F

## 2022-02-08 DIAGNOSIS — S83.282A ACUTE LATERAL MENISCUS TEAR OF LEFT KNEE, INITIAL ENCOUNTER: Primary | ICD-10-CM

## 2022-02-08 PROCEDURE — G8427 DOCREV CUR MEDS BY ELIG CLIN: HCPCS | Performed by: ORTHOPAEDIC SURGERY

## 2022-02-08 PROCEDURE — G8417 CALC BMI ABV UP PARAM F/U: HCPCS | Performed by: ORTHOPAEDIC SURGERY

## 2022-02-08 PROCEDURE — 99214 OFFICE O/P EST MOD 30 MIN: CPT | Performed by: ORTHOPAEDIC SURGERY

## 2022-02-08 PROCEDURE — G8484 FLU IMMUNIZE NO ADMIN: HCPCS | Performed by: ORTHOPAEDIC SURGERY

## 2022-02-08 PROCEDURE — 1036F TOBACCO NON-USER: CPT | Performed by: ORTHOPAEDIC SURGERY

## 2022-02-08 RX ORDER — TOPIRAMATE 100 MG/1
100 TABLET, FILM COATED ORAL 2 TIMES DAILY
COMMUNITY

## 2022-02-08 NOTE — PROGRESS NOTES
Chief Complaint   Patient presents with    Knee Pain     est patient new problem left knee pain present for 6+ months. Gt Guzmán returns today for follow-up of her left knee pain. she reports this is worse than when I saw her last.  The patient's pain level is a 6/10. The previous treatment was a scope on 12/16/16 that was successful till recently. Patient c/o mechanical symptoms like locking and catching.     Past Medical History:   Diagnosis Date    Anxiety and depression     Arthritis     Asthma     Bulging lumbar disc     Cervical disc disease     Diabetes mellitus (HCC)     diet controlled    Ectopic pregnancy     Fasciitis     Right    Fibromyalgia     GERD (gastroesophageal reflux disease)     Heart murmur     benign    Herpes genitalia     Hyperlipidemia     Hypertension     IBS (irritable bowel syndrome)     Migraines     Ovarian cyst     PONV (postoperative nausea and vomiting)     Stage 3 chronic kidney disease ([address]     Thyroid disease      Past Surgical History:   Procedure Laterality Date    CERVICAL FUSION  07/30/2014    anterior cervical discetomy fusion C6-C7    CERVICAL SPINE SURGERY      has a plate    COLONOSCOPY      DILATION AND CURETTAGE OF UTERUS      ECHO COMPL W DOP COLOR FLOW  08/24/2013         ENDOSCOPY, COLON, DIAGNOSTIC      KNEE ARTHROSCOPY Right 2014    KNEE ARTHROSCOPY Right 04/01/2016    Arthroscopy right knee chondroplasty synovectomy medial and lateral menisectomy    KNEE ARTHROSCOPY Left 12/16/2016    left knee arthoscopy medial meniscectomy chodroplasty synovectomy    KNEE ARTHROSCOPY Right 07/10/2020    RIGHT KNEE ARTHROSCOPY, MEDIAL MENISCECTOMY AND DEBRIDEMENT performed by Stephane Townsend DO at [address] [place] [address] Right 5/26/2021    EXCISION SOFT TISSUE NEOPLASM RIGTH LEG performed by Myriam Meneses MD at [address] [place] [address] Bilateral 12/06/2016    lumbar facet #1    OTHER SURGICAL HISTORY partial rt  fallopian tube removed    WA SHOULDER SCOPE BONE SHAVING Left 04/12/2018    LEFT SHOULDER ARTHROSCOPY, SUBACROMIAL DECOMPRESSION AND DEBRIDEMENT performed by Robert Francois DO at [address] ARTHROSCOPY Right 01/03/2020    RIGHT SHOULDER ARTHROSCOPY, SUBACROMIAL DECOMPRESSION AND DEBRIDEMENT LABRIUM performed by Robert Francois DO at [address] [facility] [address]         Current Outpatient Medications:     topiramate (TOPAMAX) 100 MG tablet, Take 100 mg by mouth 2 times daily, Disp: , Rfl:     Ubrogepant (UBRELVY) 100 MG TABS, Take 100 mg by mouth daily as needed (migraine) May repeat dose in 2 hours if first ineffective; no more than 2 doses in 24 hours, Disp: 10 tablet, Rfl: 3    Multiple Vitamins-Minerals (THERAPEUTIC MULTIVITAMIN-MINERALS) tablet, Take 1 tablet by mouth daily, Disp: , Rfl:     Cetirizine HCl (ZYRTEC PO), Take by mouth as needed, Disp: , Rfl:     lidocaine (LMX) 4 % cream, , Disp: , Rfl:     metFORMIN (GLUCOPHAGE) 500 MG tablet, Take 500 mg by mouth daily (with breakfast) , Disp: , Rfl:     levothyroxine (SYNTHROID) 150 MCG tablet, Take by mouth Daily , Disp: , Rfl:     vitamin D (ERGOCALCIFEROL) 1.25 MG (23836 UT) CAPS capsule, once a week , Disp: , Rfl:     simvastatin (ZOCOR) 5 MG tablet, , Disp: , Rfl:     atenolol (TENORMIN) 25 MG tablet, Take 1 tablet by mouth daily (Patient taking differently: Take 25 mg by mouth daily Takes 1 1/2 tabs), Disp: 30 tablet, Rfl: 0    Cholecalciferol (VITAMIN D3) 50 MCG (2000 UT) CAPS, Take by mouth daily, Disp: , Rfl:     oxyCODONE-acetaminophen (PERCOCET) 5-325 MG per tablet, Take 1 tablet by mouth every 8 hours as needed for Pain., Disp: , Rfl:     albuterol sulfate  (90 Base) MCG/ACT inhaler, Inhale 1 puff into the lungs every 4 hours as needed , Disp: , Rfl: 5    Milnacipran HCl (SAVELLA PO), Take 50 mg by mouth 2 times daily , Disp: , Rfl:     diclofenac sodium 1 % GEL, Apply 2 g topically 4 times daily as needed for Pain Using 4 percent, Disp: , Rfl:     tiZANidine (ZANAFLEX) 4 MG tablet, Take 4 mg by mouth every 8 hours as needed Talking 3 times a day, Disp: , Rfl:     amitriptyline (ELAVIL) 25 MG tablet, Take 25 mg by mouth nightly, Disp: , Rfl:     Elastic Bandages & Supports (KNEE BRACE) MISC, Hinged knee brace to left knee, Disp: 1 each, Rfl: 0    omeprazole (PRILOSEC) 40 MG delayed release capsule, Take 20 mg by mouth daily , Disp: , Rfl:     montelukast (SINGULAIR) 10 MG tablet, Take 10 mg by mouth nightly., Disp: , Rfl:     valACYclovir (VALTREX) 500 MG tablet, Take 500 mg by mouth as needed , Disp: , Rfl:     ondansetron (ZOFRAN) 8 MG tablet, Take 1 tablet by mouth every 8 hours as needed for Nausea or Vomiting (Patient not taking: Reported on 2/8/2022), Disp: 30 tablet, Rfl: 0  Allergies   Allergen Reactions    Aspirin Anaphylaxis and Nausea Only    Mobic [Meloxicam] Other (See Comments)     \"Almost a stroke, couldn't talk, right side numbness\"    Cortisone Hives, Nausea And Vomiting and Other (See Comments)      heart rate goes up, massive migraines facial swelling      Darvocet A500 [Propoxyphene N-Acetaminophen] Hives, Nausea And Vomiting and Other (See Comments)     Massive migraines    Folic Acid Hives    Gabapentin Other (See Comments)     Dizziness    Propoxyphene Hives     hives    Tape [Adhesive Tape] Dermatitis     \"skin spots\"     Social History     Socioeconomic History    Marital status:      Spouse name: Not on file    Number of children: Not on file    Years of education: Not on file    Highest education level: Not on file   Occupational History    Not on file   Tobacco Use    Smoking status: Never Smoker    Smokeless tobacco: Never Used   Vaping Use    Vaping Use: Never used   Substance and Sexual Activity    Alcohol use: No    Drug use: No    Sexual activity: Not on file     Comment: not assessed   Other Topics Concern    Not on file   Social History Narrative    Not on file     Social Determinants of Health     Financial Resource Strain:     Difficulty of Paying Living Expenses: Not on file   Food Insecurity:     Worried About Running Out of Food in the Last Year: Not on file    Tonya of Food in the Last Year: Not on file   Transportation Needs:     Lack of Transportation (Medical): Not on file    Lack of Transportation (Non-Medical): Not on file   Physical Activity:     Days of Exercise per Week: Not on file    Minutes of Exercise per Session: Not on file   Stress:     Feeling of Stress : Not on file   Social Connections:     Frequency of Communication with Friends and Family: Not on file    Frequency of Social Gatherings with Friends and Family: Not on file    Attends Denominational Services: Not on file    Active Member of Clubs or Organizations: Not on file    Attends Club or Organization Meetings: Not on file    Marital Status: Not on file   Intimate Partner Violence:     Fear of Current or Ex-Partner: Not on file    Emotionally Abused: Not on file    Physically Abused: Not on file    Sexually Abused: Not on file   Housing Stability:     Unable to Pay for Housing in the Last Year: Not on file    Number of [place] in the Last Year: Not on file    Unstable Housing in the Last Year: Not on file     Family History   Problem Relation Age of Onset    Diabetes Mother     Migraines Mother     Hypertension Mother     Bipolar Disorder Mother     Ovarian Cancer Sister     Bipolar Disorder Sister     Depression Sister     Mental Illness Other     Arthritis Other     Hypertension Other     No Known Problems Father        Review of Systems:     Skin: (-) rash,(-) psoriasis,(-) eczema, (-)skin cancer.    Musculoskeletal: (-) fractures,  (-) dislocations,(-) collagen vascular disease, (-) fibromyalgia, (-) multiple sclerosis, (-) muscular dystrophy, (-) RSD,(-) joint pain (-)swelling, (-) joint pain,swelling. Neurologic: (-) epilepsy, (-)seizures,(-) brain tumor,(-) TIA, (-)stroke, (-)headaches, (-)Parkinson disease,(-) memory loss, (-) LOC. Cardiovascular: (-) Chest pain, (-) swelling in legs/feet, (-) SOB, (-) cramping in legs/feet with walking. Constitutional:  The patient is alert and oriented x 3, appears to be stated age and in no distress. Temp 98 °F (36.7 °C)   Ht 5' 3\" (1.6 m)   Wt 240 lb (108.9 kg)   LMP 01/14/2022   BMI 42.51 kg/m²     Skin:  Upon inspection: the skin appears warm, dry and intact. There is not a previous scar over the affected area. There is not any cellulitis, lymphedema or cutaneous lesions noted in the lower extremities. Upon palpation there is no induration noted. Neurologic:  Gait: normal;  Motor exam of the lower extremities show ; quadriceps, hamstrings, foot dorsi and plantar flexors intact R.  5/5 and L. 5/5. Deep tendon reflexes are 2/4 at the knees and 2/4 at the ankles with strong extensor hallicus longus motor strength bilaterally. Sensory to both feet is intact to all sensory roots. Cardiovascular: The vascular exam is normal and is well perfused to distal extremities. Distal pulses DP/PT: R. 2+; L. 2+. There is cap refill noted less than two seconds in all digits. There is not edema of the bilateral lower extremities. There is not varicosities noted in the distal extremities. Lymph:  Upon palpation,  there is no lymphadenopathy noted in bilateral lower extremities. Musculoskeletal:  Gait: antalgic; examination of the nails and digits reveal no cyanosis or clubbing    Lumbar exam:  On visual inspection, there is no deformity of the spine. full range of motion, no tenderness, palpable spasm or pain on motion. Special tests: Straight Leg Raise negative, Omer testnegative. Hip exam:  Upon inspection, there is no deformity noted. Upon palpation there is not tenderness.   ROM: is   full and semetrical.   Strength: Hip Flexors 5/5; Hip Abductors 5/5; Hip Adduction 5/5. Knee exam:  Left knee exam shows;  range of motion of R. Knee is 0 to 120, and L. Knee is 0 to 120. She does have  pain on motion, effusion is mild, there is tenderness over the  medial, lateral, anterior region, there are not any masses, there is not ligamentous instability, there is not  deformity noted. Knee exam: left positive for moderate crepitations, some mild tenderness laxity is not noted wit stress. R. Knee:  Lachman's negative, Anterior Drawer negative, Posterior Drawer negative  Miranda's negative, Thallasy  negative,   PF grind test negative, Apprehension test negative, Patellar J sign  negative  L. Knee:  Lachman's negative, Anterior Drawer negative, Posterior Drawer negative  Miranda's positive, Thallasy  positive,   PF grind test positive, Apprehension test negative,  Patellar J sign  negative    Xrays:   Impression   1. There is no fracture or dislocation of the left knee. MRI:   None today  Radiographic findings reviewed with patient    Impression:  Encounter Diagnosis   Name Primary?  Acute lateral meniscus tear of left knee, initial encounter Yes       Plan:   Natural history and expected course discussed. Questions answered. Educational materials distributed. Rest, ice, compression, and elevation (RICE) therapy. Reduction in offending activity.    MRI left knee

## 2022-02-22 ENCOUNTER — HOSPITAL ENCOUNTER (OUTPATIENT)
Dept: INFUSION THERAPY | Age: 42
Discharge: HOME | End: 2022-02-22
Payer: COMMERCIAL

## 2022-02-22 DIAGNOSIS — D64.9 ANEMIA, UNSPECIFIED TYPE: ICD-10-CM

## 2022-02-22 LAB
BASOPHILS ABSOLUTE: 0.02 E9/L (ref 0–0.2)
BASOPHILS RELATIVE PERCENT: 0.4 % (ref 0–2)
EOSINOPHILS ABSOLUTE: 0.08 E9/L (ref 0.05–0.5)
EOSINOPHILS RELATIVE PERCENT: 1.7 % (ref 0–6)
FERRITIN: 385 NG/ML
HCT VFR BLD CALC: 45.9 % (ref 34–48)
HEMOGLOBIN: 14.1 G/DL (ref 11.5–15.5)
IMMATURE GRANULOCYTES #: 0.01 E9/L
IMMATURE GRANULOCYTES %: 0.2 % (ref 0–5)
IRON SATURATION: 53 % (ref 15–50)
IRON: 139 MCG/DL (ref 37–145)
LYMPHOCYTES ABSOLUTE: 0.96 E9/L (ref 1.5–4)
LYMPHOCYTES RELATIVE PERCENT: 20.5 % (ref 20–42)
MCH RBC QN AUTO: 30.7 PG (ref 26–35)
MCHC RBC AUTO-ENTMCNC: 30.7 % (ref 32–34.5)
MCV RBC AUTO: 99.8 FL (ref 80–99.9)
MONOCYTES ABSOLUTE: 0.32 E9/L (ref 0.1–0.95)
MONOCYTES RELATIVE PERCENT: 6.8 % (ref 2–12)
NEUTROPHILS ABSOLUTE: 3.29 E9/L (ref 1.8–7.3)
NEUTROPHILS RELATIVE PERCENT: 70.4 % (ref 43–80)
PDW BLD-RTO: 16.4 FL (ref 11.5–15)
PLATELET # BLD: 240 E9/L (ref 130–450)
PMV BLD AUTO: 11.8 FL (ref 7–12)
RBC # BLD: 4.6 E12/L (ref 3.5–5.5)
TOTAL IRON BINDING CAPACITY: 261 MCG/DL (ref 250–450)
WBC # BLD: 4.7 E9/L (ref 4.5–11.5)

## 2022-02-22 PROCEDURE — 83540 ASSAY OF IRON: CPT

## 2022-02-22 PROCEDURE — 83550 IRON BINDING TEST: CPT

## 2022-02-22 PROCEDURE — 82728 ASSAY OF FERRITIN: CPT

## 2022-02-22 PROCEDURE — 85025 COMPLETE CBC W/AUTO DIFF WBC: CPT

## 2022-02-22 PROCEDURE — 36415 COLL VENOUS BLD VENIPUNCTURE: CPT

## 2022-02-24 ENCOUNTER — OFFICE VISIT (OUTPATIENT)
Dept: ONCOLOGY | Age: 42
End: 2022-02-24
Payer: COMMERCIAL

## 2022-02-24 VITALS
SYSTOLIC BLOOD PRESSURE: 143 MMHG | WEIGHT: 241.4 LBS | DIASTOLIC BLOOD PRESSURE: 105 MMHG | BODY MASS INDEX: 42.77 KG/M2 | HEIGHT: 63 IN | TEMPERATURE: 98.4 F | OXYGEN SATURATION: 98 % | HEART RATE: 93 BPM

## 2022-02-24 DIAGNOSIS — D64.9 ANEMIA, UNSPECIFIED TYPE: Primary | ICD-10-CM

## 2022-02-24 PROCEDURE — 1036F TOBACCO NON-USER: CPT | Performed by: INTERNAL MEDICINE

## 2022-02-24 PROCEDURE — G8427 DOCREV CUR MEDS BY ELIG CLIN: HCPCS | Performed by: INTERNAL MEDICINE

## 2022-02-24 PROCEDURE — G8484 FLU IMMUNIZE NO ADMIN: HCPCS | Performed by: INTERNAL MEDICINE

## 2022-02-24 PROCEDURE — G8417 CALC BMI ABV UP PARAM F/U: HCPCS | Performed by: INTERNAL MEDICINE

## 2022-02-24 PROCEDURE — 99213 OFFICE O/P EST LOW 20 MIN: CPT | Performed by: INTERNAL MEDICINE

## 2022-02-24 PROCEDURE — 99212 OFFICE O/P EST SF 10 MIN: CPT

## 2022-02-24 NOTE — PROGRESS NOTES
(gastroesophageal reflux disease)     Heart murmur     benign    Herpes genitalia     Hyperlipidemia     Hypertension     IBS (irritable bowel syndrome)     Migraines     Ovarian cyst     PONV (postoperative nausea and vomiting)     Stage 3 chronic kidney disease ([address]     Thyroid disease      Patient Active Problem List   Diagnosis    Asthma    Migraines    SLE (systemic lupus erythematosus) (HCC)    Primary osteoarthritis of right knee    Primary osteoarthritis of left knee    Displacement of lumbar intervertebral disc    Neural foraminal stenosis of lumbar spine    Lumbar facet arthropathy    Morbid obesity ([address]    Essential hypertension    Type 2 diabetes mellitus without complication, without long-term current use of insulin ([address]    Pure hypercholesterolemia    Soft tissue neoplasm    Anemia        Past Surgical History:      Procedure Laterality Date    CERVICAL FUSION  07/30/2014    anterior cervical discetomy fusion C6-C7    CERVICAL SPINE SURGERY      has a plate    COLONOSCOPY      DILATION AND CURETTAGE OF UTERUS      ECHO COMPL W DOP COLOR FLOW  08/24/2013         ENDOSCOPY, COLON, DIAGNOSTIC      KNEE ARTHROSCOPY Right 2014    KNEE ARTHROSCOPY Right 04/01/2016    Arthroscopy right knee chondroplasty synovectomy medial and lateral menisectomy    KNEE ARTHROSCOPY Left 12/16/2016    left knee arthoscopy medial meniscectomy chodroplasty synovectomy    KNEE ARTHROSCOPY Right 07/10/2020    RIGHT KNEE ARTHROSCOPY, MEDIAL MENISCECTOMY AND DEBRIDEMENT performed by Carmen Ramsey DO at [address] [place] [address] Right 5/26/2021    EXCISION SOFT TISSUE NEOPLASM RIGTH LEG performed by Anni Spencer MD at [place] [address] Bilateral 12/06/2016    lumbar facet #1    OTHER SURGICAL HISTORY      partial rt  fallopian tube removed    MA SHOULDER SCOPE BONE SHAVING Left 04/12/2018    LEFT SHOULDER ARTHROSCOPY, SUBACROMIAL DECOMPRESSION AND DEBRIDEMENT performed by Phoebe Ladd DO at [address] [facility] ARTHROSCOPY Right 01/03/2020    RIGHT SHOULDER ARTHROSCOPY, SUBACROMIAL DECOMPRESSION AND DEBRIDEMENT LABRIUM performed by Phoebe Ladd DO [facility] [address] [facility] [address]         Family History:  Family History   Problem Relation Age of Onset    Diabetes Mother    Wilhelminia Blazing Migraines Mother     Hypertension Mother     Bipolar Disorder Mother     Ovarian Cancer Sister     Bipolar Disorder Sister     Depression Sister     Mental Illness Other     Arthritis Other     Hypertension Other     No Known Problems Father        Medications:  Reviewed and reconciled. Social History:  Social History     Socioeconomic History    Marital status:      Spouse name: Not on file    Number of children: Not on file    Years of education: Not on file    Highest education level: Not on file   Occupational History    Not on file   Tobacco Use    Smoking status: Never Smoker    Smokeless tobacco: Never Used   Vaping Use    Vaping Use: Never used   Substance and Sexual Activity    Alcohol use: No    Drug use: No    Sexual activity: Not on file     Comment: not assessed   Other Topics Concern    Not on file   Social History Narrative    Not on file     Social Determinants of Health     Financial Resource Strain:     Difficulty of Paying Living Expenses: Not on file   Food Insecurity:     Worried About Running Out of Food in the Last Year: Not on file    Tonya of Food in the Last Year: Not on file   Transportation Needs:     Lack of Transportation (Medical): Not on file    Lack of Transportation (Non-Medical):  Not on file   Physical Activity:     Days of Exercise per Week: Not on file    Minutes of Exercise per Session: Not on file   Stress:     Feeling of Stress : Not on file   Social Connections:     Frequency of Communication with Friends and Family: Not on file    Frequency of Social Gatherings with Friends and Family: Not on file    Attends Jain Services: Not on file    Active Member of Clubs or Organizations: Not on file    Attends Club or Organization Meetings: Not on file    Marital Status: Not on file   Intimate Partner Violence:     Fear of Current or Ex-Partner: Not on file    Emotionally Abused: Not on file    Physically Abused: Not on file    Sexually Abused: Not on file   Housing Stability:     Unable to Pay for Housing in the Last Year: Not on file    Number of [place] in the Last Year: Not on file    Unstable Housing in the Last Year: Not on file       Allergies: Allergies   Allergen Reactions    Aspirin Anaphylaxis and Nausea Only    Mobic [Meloxicam] Other (See Comments)     \"Almost a stroke, couldn't talk, right side numbness\"    Cortisone Hives, Nausea And Vomiting and Other (See Comments)      heart rate goes up, massive migraines facial swelling      Darvocet A500 [Propoxyphene N-Acetaminophen] Hives, Nausea And Vomiting and Other (See Comments)     Massive migraines    Folic Acid Hives    Gabapentin Other (See Comments)     Dizziness    Propoxyphene Hives     hives    Tape [Adhesive Tape] Dermatitis     \"skin spots\"       Physical Exam:  BP (!) 143/105   Pulse 93   Temp 98.4 °F (36.9 °C)   Ht 5' 3\" (1.6 m)   Wt 241 lb 6.4 oz (109.5 kg)   SpO2 98%   BMI 42.76 kg/m²   GENERAL: Alert, oriented x 3, not in acute distress. HEENT: PERRLA; EOMI. Oropharynx clear. NECK: Supple. No palpable cervical or supraclavicular lymphadenopathy. LUNGS: Good air entry bilaterally. No wheezing, crackles or rhonchi. CARDIOVASCULAR: Regular rate. No murmurs, rubs or gallops. ABDOMEN: Soft. Non-tender, non-distended. Positive bowel sounds. EXTREMITIES: Without clubbing, cyanosis, or edema. NEUROLOGIC: No focal deficits. ECOG PS 0      Impression/Plan:        Mrs. Santana Otoole is a pleasant 42-year-old lady, with a past medical history significant for asthma, anxiety and depression, DM, GERD, CKD, hyperlipidemia, hypertension, and fibromyalgia, who was referred to the hematology office for evaluation of iron deficiency that was not responsive to the oral iron. The patient's hemoglobin hematocrit are normal, but she has persistent iron deficiency, she had blood work done with Dr. Garry Martin, was told she needed parenteral iron infusion. The patient is feeling tired, she has heavy menstrual bleeding, no melena or hematochezia. She had GI side effects with the oral iron. The patient has iron deficiency without anemia, likely secondary to heavy menstrual bleeding, she has not been responding to the oral iron, also had GI side effects from it, recommended parenteral iron infusion as she is symptomatic, discussed with the patient Feraheme x2 doses 1 week apart, the side effects were reviewed with the patient, The patient received the first dose of Feraheme on 1/6/2022, she had nausea, was medicated with Zofran with the second dose FIT test is negative, GI w/up in 2019 was neg for bleeding, she has mild leukopenia with lymphocytopenia, likely reactive, had resolved. Her white count is 4.7, hemoglobin had improved to 14.1, hematocrit 35.9, ferritin is up to 385 from 12, iron 139, TIBC 261, TSAT had increased from 14 to 53%. We will monitor her CBCD and iron studies, she will receive parenteral iron infusion as indicated. RTC in 3 months    Thank you for allowing us to participate in the care of Mrs. Saira Epstein.     Pastor Saadia MD   HEMATOLOGY/MEDICAL [address] [address] [address]  [address] [address]  Dept: [place]: [phone]

## 2022-02-28 ENCOUNTER — OFFICE VISIT (OUTPATIENT)
Dept: ORTHOPEDIC SURGERY | Age: 42
End: 2022-02-28
Payer: COMMERCIAL

## 2022-02-28 VITALS — BODY MASS INDEX: 42.7 KG/M2 | WEIGHT: 241 LBS | HEIGHT: 63 IN

## 2022-02-28 DIAGNOSIS — M17.12 PRIMARY OSTEOARTHRITIS OF LEFT KNEE: Primary | ICD-10-CM

## 2022-02-28 PROCEDURE — G8484 FLU IMMUNIZE NO ADMIN: HCPCS | Performed by: ORTHOPAEDIC SURGERY

## 2022-02-28 PROCEDURE — G8427 DOCREV CUR MEDS BY ELIG CLIN: HCPCS | Performed by: ORTHOPAEDIC SURGERY

## 2022-02-28 PROCEDURE — G8417 CALC BMI ABV UP PARAM F/U: HCPCS | Performed by: ORTHOPAEDIC SURGERY

## 2022-02-28 PROCEDURE — 99214 OFFICE O/P EST MOD 30 MIN: CPT | Performed by: ORTHOPAEDIC SURGERY

## 2022-02-28 PROCEDURE — 1036F TOBACCO NON-USER: CPT | Performed by: ORTHOPAEDIC SURGERY

## 2022-02-28 NOTE — PROGRESS NOTES
Chief Complaint   Patient presents with    Knee Pain     Left knee MRI follow up       Rolan Leiva returns today for follow-up of her left knee pain. she reports this is worse than when I saw her last.  The patient's pain level is a 6/10. The previous treatment was a scope on 12/16/16 that was successful till recently. Patient c/o mechanical symptoms like locking and catching. She is here today for mri results.      Past Medical History:   Diagnosis Date    Anxiety and depression     Arthritis     Asthma     Bulging lumbar disc     Cervical disc disease     Diabetes mellitus (HCC)     diet controlled    Ectopic pregnancy     Fasciitis     Right    Fibromyalgia     GERD (gastroesophageal reflux disease)     Heart murmur     benign    Herpes genitalia     Hyperlipidemia     Hypertension     IBS (irritable bowel syndrome)     Migraines     Ovarian cyst     PONV (postoperative nausea and vomiting)     Stage 3 chronic kidney disease ([address]     Thyroid disease      Past Surgical History:   Procedure Laterality Date    CERVICAL FUSION  07/30/2014    anterior cervical discetomy fusion C6-C7    CERVICAL SPINE SURGERY      has a plate    COLONOSCOPY      DILATION AND CURETTAGE OF UTERUS      ECHO COMPL W DOP COLOR FLOW  08/24/2013         ENDOSCOPY, COLON, DIAGNOSTIC      KNEE ARTHROSCOPY Right 2014    KNEE ARTHROSCOPY Right 04/01/2016    Arthroscopy right knee chondroplasty synovectomy medial and lateral menisectomy    KNEE ARTHROSCOPY Left 12/16/2016    left knee arthoscopy medial meniscectomy chodroplasty synovectomy    KNEE ARTHROSCOPY Right 07/10/2020    RIGHT KNEE ARTHROSCOPY, MEDIAL MENISCECTOMY AND DEBRIDEMENT performed by Brooks Stinson DO at [address] [place] [address] Right 5/26/2021    EXCISION SOFT TISSUE NEOPLASM RIGTH LEG performed by Renetta Lam MD at [address] [facility] [address] Bilateral 12/06/2016    lumbar facet #1    OTHER SURGICAL HISTORY      partial rt  fallopian tube removed    SD SHOULDER SCOPE BONE SHAVING Left 04/12/2018    LEFT SHOULDER ARTHROSCOPY, SUBACROMIAL DECOMPRESSION AND DEBRIDEMENT performed by Aleida Licona DO at [address] ARTHROSCOPY Right 01/03/2020    RIGHT SHOULDER ARTHROSCOPY, SUBACROMIAL DECOMPRESSION AND DEBRIDEMENT LABRIUM performed by Aleida Licona DO at [address] [facility] [address]         Current Outpatient Medications:     topiramate (TOPAMAX) 100 MG tablet, Take 100 mg by mouth 2 times daily, Disp: , Rfl:     ondansetron (ZOFRAN) 8 MG tablet, Take 1 tablet by mouth every 8 hours as needed for Nausea or Vomiting, Disp: 30 tablet, Rfl: 0    Ubrogepant (UBRELVY) 100 MG TABS, Take 100 mg by mouth daily as needed (migraine) May repeat dose in 2 hours if first ineffective; no more than 2 doses in 24 hours, Disp: 10 tablet, Rfl: 3    Multiple Vitamins-Minerals (THERAPEUTIC MULTIVITAMIN-MINERALS) tablet, Take 1 tablet by mouth daily, Disp: , Rfl:     Cetirizine HCl (ZYRTEC PO), Take by mouth as needed, Disp: , Rfl:     lidocaine (LMX) 4 % cream, , Disp: , Rfl:     metFORMIN (GLUCOPHAGE) 500 MG tablet, Take 500 mg by mouth daily (with breakfast) , Disp: , Rfl:     levothyroxine (SYNTHROID) 150 MCG tablet, Take by mouth Daily , Disp: , Rfl:     vitamin D (ERGOCALCIFEROL) 1.25 MG (31583 UT) CAPS capsule, once a week , Disp: , Rfl:     simvastatin (ZOCOR) 5 MG tablet, , Disp: , Rfl:     atenolol (TENORMIN) 25 MG tablet, Take 1 tablet by mouth daily (Patient taking differently: Take 25 mg by mouth daily Takes 1 1/2 tabs), Disp: 30 tablet, Rfl: 0    Cholecalciferol (VITAMIN D3) 50 MCG (2000 UT) CAPS, Take by mouth daily, Disp: , Rfl:     oxyCODONE-acetaminophen (PERCOCET) 5-325 MG per tablet, Take 1 tablet by mouth every 8 hours as needed for Pain., Disp: , Rfl:     albuterol sulfate  (90 Base) MCG/ACT inhaler, Inhale 1 puff into the lungs every 4 hours as needed , Disp: , Rfl: 5    Milnacipran HCl (SAVELLA PO), Take 50 mg by mouth 2 times daily , Disp: , Rfl:     diclofenac sodium 1 % GEL, Apply 2 g topically 4 times daily as needed for Pain Using 4 percent, Disp: , Rfl:     tiZANidine (ZANAFLEX) 4 MG tablet, Take 4 mg by mouth every 8 hours as needed Talking 3 times a day, Disp: , Rfl:     amitriptyline (ELAVIL) 25 MG tablet, Take 25 mg by mouth nightly, Disp: , Rfl:     Elastic Bandages & Supports (KNEE BRACE) MISC, Hinged knee brace to left knee, Disp: 1 each, Rfl: 0    omeprazole (PRILOSEC) 40 MG delayed release capsule, Take 20 mg by mouth daily , Disp: , Rfl:     montelukast (SINGULAIR) 10 MG tablet, Take 10 mg by mouth nightly., Disp: , Rfl:     valACYclovir (VALTREX) 500 MG tablet, Take 500 mg by mouth as needed , Disp: , Rfl:   Allergies   Allergen Reactions    Aspirin Anaphylaxis and Nausea Only    Mobic [Meloxicam] Other (See Comments)     \"Almost a stroke, couldn't talk, right side numbness\"    Cortisone Hives, Nausea And Vomiting and Other (See Comments)      heart rate goes up, massive migraines facial swelling      Darvocet A500 [Propoxyphene N-Acetaminophen] Hives, Nausea And Vomiting and Other (See Comments)     Massive migraines    Folic Acid Hives    Gabapentin Other (See Comments)     Dizziness    Propoxyphene Hives     hives    Tape [Adhesive Tape] Dermatitis     \"skin spots\"     Social History     Socioeconomic History    Marital status:      Spouse name: Not on file    Number of children: Not on file    Years of education: Not on file    Highest education level: Not on file   Occupational History    Not on file   Tobacco Use    Smoking status: Never Smoker    Smokeless tobacco: Never Used   Vaping Use    Vaping Use: Never used   Substance and Sexual Activity    Alcohol use: No    Drug use: No    Sexual activity: Not on file     Comment: not assessed   Other Topics Concern    Not on file   Social History Narrative    Not on file     Social Determinants of Health     Financial Resource Strain:     Difficulty of Paying Living Expenses: Not on file   Food Insecurity:     Worried About Running Out of Food in the Last Year: Not on file    Tonya of Food in the Last Year: Not on file   Transportation Needs:     Lack of Transportation (Medical): Not on file    Lack of Transportation (Non-Medical): Not on file   Physical Activity:     Days of Exercise per Week: Not on file    Minutes of Exercise per Session: Not on file   Stress:     Feeling of Stress : Not on file   Social Connections:     Frequency of Communication with Friends and Family: Not on file    Frequency of Social Gatherings with Friends and Family: Not on file    Attends Pentecostalism Services: Not on file    Active Member of Clubs or Organizations: Not on file    Attends Club or Organization Meetings: Not on file    Marital Status: Not on file   Intimate Partner Violence:     Fear of Current or Ex-Partner: Not on file    Emotionally Abused: Not on file    Physically Abused: Not on file    Sexually Abused: Not on file   Housing Stability:     Unable to Pay for Housing in the Last Year: Not on file    Number of [place] in the Last Year: Not on file    Unstable Housing in the Last Year: Not on file     Family History   Problem Relation Age of Onset    Diabetes Mother     Migraines Mother     Hypertension Mother     Bipolar Disorder Mother     Ovarian Cancer Sister     Bipolar Disorder Sister     Depression Sister     Mental Illness Other     Arthritis Other     Hypertension Other     No Known Problems Father        Review of Systems:     Skin: (-) rash,(-) psoriasis,(-) eczema, (-)skin cancer. Musculoskeletal: (-) fractures,  (-) dislocations,(-) collagen vascular disease, (-) fibromyalgia, (-) multiple sclerosis, (-) muscular dystrophy, (-) RSD,(-) joint pain (-)swelling, (-) joint pain,swelling.   Neurologic: (-) epilepsy, (-)seizures,(-) brain tumor,(-) TIA, (-)stroke, (-)headaches, (-)Parkinson disease,(-) memory loss, (-) LOC. Cardiovascular: (-) Chest pain, (-) swelling in legs/feet, (-) SOB, (-) cramping in legs/feet with walking. Constitutional:  _Ht 5' 3\" (1.6 m)   Wt 241 lb (109.3 kg)   BMI 42.69 kg/m²  Vital signs are stable. In general, patient is awake, alert and oriented X3, in no apparent distress. Examination of HENT reveals normocephalic, atraumatic. PERRLA/EOMI sclera are white. Conjunctivae are clear. TM's are intact. Pharynx is pink and moist.  Uvula and tongue are midline. Heart: Positive S1 and positive S2 with regular rate and rhythm. Lungs: Clear to auscultation bilaterally without rales, rhonchi or wheezes. Abdomen: soft, nontender. Positive bowel sounds. No organomegaly. No guarding or rigidity. The patient is alert and oriented x 3, appears to be stated age and in no distress. Ht 5' 3\" (1.6 m)   Wt 241 lb (109.3 kg)   BMI 42.69 kg/m²     Skin:  Upon inspection: the skin appears warm, dry and intact. There is not a previous scar over the affected area. There is not any cellulitis, lymphedema or cutaneous lesions noted in the lower extremities. Upon palpation there is no induration noted. Neurologic:  Gait: normal;  Motor exam of the lower extremities show ; quadriceps, hamstrings, foot dorsi and plantar flexors intact R.  5/5 and L. 5/5. Deep tendon reflexes are 2/4 at the knees and 2/4 at the ankles with strong extensor hallicus longus motor strength bilaterally. Sensory to both feet is intact to all sensory roots. Cardiovascular: The vascular exam is normal and is well perfused to distal extremities. Distal pulses DP/PT: R. 2+; L. 2+. There is cap refill noted less than two seconds in all digits. There is not edema of the bilateral lower extremities. There is not varicosities noted in the distal extremities.       Lymph:  Upon palpation,  there is no lymphadenopathy noted in bilateral lower extremities. Musculoskeletal:  Gait: antalgic; examination of the nails and digits reveal no cyanosis or clubbing    Lumbar exam:  On visual inspection, there is no deformity of the spine. full range of motion, no tenderness, palpable spasm or pain on motion. Special tests: Straight Leg Raise negative, Omer testnegative. Hip exam:  Upon inspection, there is no deformity noted. Upon palpation there is not tenderness. ROM: is   full and semetrical.   Strength: Hip Flexors 5/5; Hip Abductors 5/5; Hip Adduction 5/5. Knee exam:  Left knee exam shows;  range of motion of R. Knee is 0 to 120, and L. Knee is 0 to 120. She does have  pain on motion, effusion is mild, there is tenderness over the  medial, lateral, anterior region, there are not any masses, there is not ligamentous instability, there is not  deformity noted. Knee exam: left positive for moderate crepitations, some mild tenderness laxity is not noted wit stress. R. Knee:  Lachman's negative, Anterior Drawer negative, Posterior Drawer negative  Miranda's negative, Thallasy  negative,   PF grind test negative, Apprehension test negative, Patellar J sign  negative  L. Knee:  Lachman's negative, Anterior Drawer negative, Posterior Drawer negative  Miranda's positive, Thallasy  positive,   PF grind test positive, Apprehension test negative,  Patellar J sign  negative    Xrays:   Impression   1. There is no fracture or dislocation of the left knee. MRI:   NO EVIDENCE OF MENISCAL TEAR OR ACUTE LIGAMENTOUS INJURY. MILD TRICOMPARTMENTAL CHONDRAL DEGENERATIVE CHANGES, GREATEST INVOLVING   THE LATERAL TIBIAL PLATEAU. .      Radiographic findings reviewed with patient    Impression:  Encounter Diagnosis   Name Primary?  Primary osteoarthritis of left knee Yes       Plan:   Natural history and expected course discussed. Questions answered. Educational materials distributed.   Rest, ice, compression, and elevation (RICE) therapy. Reduction in offending activity. The risks and benefits of a knee arthroscopy were discussed with the patient. The risks are including but not limited to: infection, injuries to blood vessels and nerves, non relief of symptoms, arthrofibrosis of knee, need for further operative intervention, blood loss, PE/DVT, MI and death. The risks are understood by the patient and they wish to proceed with a Left knee arthroscopy and debridement At least 30 minutes was spent discussing the diagnosis and treatment options with the patient with at least 50% of the time was spent with decision making and counseling the patient. Marquis Meyers

## 2022-03-02 ENCOUNTER — TELEPHONE (OUTPATIENT)
Dept: ORTHOPEDIC SURGERY | Age: 42
End: 2022-03-02

## 2022-03-02 NOTE — TELEPHONE ENCOUNTER
Prior Authorization Form:      DEMOGRAPHICS:                     Patient Name:  Edgar Waldrop  Patient :  1980            Insurance:  Payor: Yael Henley / Plan: Luis Infante / Product Type: *No Product type* /   Insurance ID Number:    Payor/Plan Subscr  Sex Relation Sub. Ins. ID Effective Group Num   1. TIGRE - * JACKIE STANLEY* 1980 Female Self [id number] 1/1/15 [facility] [address]         DIAGNOSIS & PROCEDURE:                       Procedure/Operation: left knee arthroscopy and debridement           CPT Code: 60806    Diagnosis:  Left knee DJD    ICD10 Code: M17.12    Location:  [address] [place] [address]    Surgeon:   Sundar Adler    SCHEDULING INFORMATION:                          Date: 22    Time: To follow              Anesthesia:  General                                                       Status:  Outpatient        Special Comments:  None       Electronically signed by Wendi Sheffield ATC on 3/2/2022 at 1:22 PM

## 2022-03-07 ENCOUNTER — HOSPITAL ENCOUNTER (OUTPATIENT)
Age: 42
Discharge: HOME | End: 2022-03-09
Payer: COMMERCIAL

## 2022-03-07 DIAGNOSIS — M17.12 OSTEOARTHRITIS OF LEFT KNEE, UNSPECIFIED OSTEOARTHRITIS TYPE: ICD-10-CM

## 2022-03-07 DIAGNOSIS — D64.9 ANEMIA, UNSPECIFIED TYPE: ICD-10-CM

## 2022-03-07 LAB
ANION GAP SERPL CALCULATED.3IONS-SCNC: 8 MMOL/L (ref 7–16)
BUN BLDV-MCNC: 16 MG/DL (ref 6–20)
CALCIUM SERPL-MCNC: 9.5 MG/DL (ref 8.6–10.2)
CHLORIDE BLD-SCNC: 106 MMOL/L (ref 98–107)
CO2: 23 MMOL/L (ref 22–29)
CREAT SERPL-MCNC: 1 MG/DL (ref 0.5–1)
GFR AFRICAN AMERICAN: >60
GFR NON-AFRICAN AMERICAN: >60 ML/MIN/1.73
GLUCOSE BLD-MCNC: 104 MG/DL (ref 74–99)
POTASSIUM SERPL-SCNC: 3.9 MMOL/L (ref 3.5–5)
SODIUM BLD-SCNC: 137 MMOL/L (ref 132–146)

## 2022-03-07 PROCEDURE — U0005 INFEC AGEN DETEC AMPLI PROBE: HCPCS

## 2022-03-07 PROCEDURE — U0003 INFECTIOUS AGENT DETECTION BY NUCLEIC ACID (DNA OR RNA); SEVERE ACUTE RESPIRATORY SYNDROME CORONAVIRUS 2 (SARS-COV-2) (CORONAVIRUS DISEASE [COVID-19]), AMPLIFIED PROBE TECHNIQUE, MAKING USE OF HIGH THROUGHPUT TECHNOLOGIES AS DESCRIBED BY CMS-2020-01-R: HCPCS

## 2022-03-09 ENCOUNTER — ANESTHESIA EVENT (OUTPATIENT)
Dept: OPERATING ROOM | Age: 42
End: 2022-03-09
Payer: COMMERCIAL

## 2022-03-09 LAB — SARS-COV-2, PCR: NOT DETECTED

## 2022-03-09 NOTE — ANESTHESIA PRE PROCEDURE
Department of Anesthesiology  Preprocedure Note       Name:  Magdalena Benoit   Age:  39 y.o.  :  1980                                          MRN:  [id number]         Date:  3/10/2022      Surgeon: Sunshine Harry): Nnamdi Lenz DO    Procedure: Procedure(s):  Left knee arthroscopy and debridement    Medications prior to admission:   Prior to Admission medications    Medication Sig Start Date End Date Taking? Authorizing Provider   topiramate (TOPAMAX) 100 MG tablet Take 100 mg by mouth 2 times daily   Yes Historical Provider, MD   Ubrogepant (UBRELVY) 100 MG TABS Take 100 mg by mouth daily as needed (migraine) May repeat dose in 2 hours if first ineffective; no more than 2 doses in 24 hours 21  Yes Liliya Grant APRN - CNP   Multiple Vitamins-Minerals (THERAPEUTIC MULTIVITAMIN-MINERALS) tablet Take 1 tablet by mouth daily   Yes Historical Provider, MD   Cetirizine HCl (ZYRTEC PO) Take 10 mg by mouth daily    Yes Historical Provider, MD   metFORMIN (GLUCOPHAGE) 500 MG tablet Take 500 mg by mouth 2 times daily (with meals)  21  Yes Historical Provider, MD   levothyroxine (SYNTHROID) 150 MCG tablet Take 150 mcg by mouth Daily  1/15/21  Yes Historical Provider, MD   vitamin D (ERGOCALCIFEROL) 1.25 MG (03111 UT) CAPS capsule once a week  21  Yes Historical Provider, MD   simvastatin (ZOCOR) 5 MG tablet Take 5 mg by mouth nightly  21  Yes Historical Provider, MD   atenolol (TENORMIN) 25 MG tablet Take 1 tablet by mouth daily  Patient taking differently: Take 25 mg by mouth in the morning and at bedtime Takes 1/2 tab PM 20  Yes James Lopez MD   Cholecalciferol (VITAMIN D3) 50 MCG (2000 UT) CAPS Take by mouth daily   Yes Historical Provider, MD   oxyCODONE-acetaminophen (PERCOCET) 5-325 MG per tablet Take 1 tablet by mouth every 8 hours as needed for Pain.    Yes Historical Provider, MD   albuterol sulfate  (90 Base) MCG/ACT inhaler Inhale 1 puff into the lungs every 4 hours as needed  11/7/19  Yes Historical Provider, MD   Milnacipran HCl (SAVELLA PO) Take 50 mg by mouth 2 times daily    Yes Historical Provider, MD   tiZANidine (ZANAFLEX) 4 MG tablet Take 4 mg by mouth every 8 hours as needed Talking 3 times a day   Yes Historical Provider, MD   amitriptyline (ELAVIL) 25 MG tablet Take 25 mg by mouth nightly   Yes Historical Provider, MD   omeprazole (PRILOSEC) 40 MG delayed release capsule Take 20 mg by mouth in the morning and at bedtime    Yes Historical Provider, MD   montelukast (SINGULAIR) 10 MG tablet Take 10 mg by mouth nightly. Yes Historical Provider, MD   valACYclovir (VALTREX) 500 MG tablet Take 500 mg by mouth as needed    Yes Historical Provider, MD   ondansetron (ZOFRAN) 8 MG tablet Take 1 tablet by mouth every 8 hours as needed for Nausea or Vomiting 1/13/22   Gorge Khoury MD   lidocaine (LMX) 4 % cream  4/12/21   Historical Provider, MD   diclofenac sodium 1 % GEL Apply 2 g topically 4 times daily as needed for Pain Using 4 percent    Historical Provider, MD   Elastic Bandages & Supports (KNEE BRACE) MISC Hinged knee brace to left knee 8/1/17   Jeffrey Desouza, DO       Current medications:    No current facility-administered medications for this encounter.      Current Outpatient Medications   Medication Sig Dispense Refill    topiramate (TOPAMAX) 100 MG tablet Take 100 mg by mouth 2 times daily      Ubrogepant (UBRELVY) 100 MG TABS Take 100 mg by mouth daily as needed (migraine) May repeat dose in 2 hours if first ineffective; no more than 2 doses in 24 hours 10 tablet 3    Multiple Vitamins-Minerals (THERAPEUTIC MULTIVITAMIN-MINERALS) tablet Take 1 tablet by mouth daily      Cetirizine HCl (ZYRTEC PO) Take 10 mg by mouth daily       metFORMIN (GLUCOPHAGE) 500 MG tablet Take 500 mg by mouth 2 times daily (with meals)       levothyroxine (SYNTHROID) 150 MCG tablet Take 150 mcg by mouth Daily       vitamin D (ERGOCALCIFEROL) 1.25 MG (43562 UT) CAPS capsule once a week       simvastatin (ZOCOR) 5 MG tablet Take 5 mg by mouth nightly       atenolol (TENORMIN) 25 MG tablet Take 1 tablet by mouth daily (Patient taking differently: Take 25 mg by mouth in the morning and at bedtime Takes 1/2 tab PM) 30 tablet 0    Cholecalciferol (VITAMIN D3) 50 MCG (2000 UT) CAPS Take by mouth daily      oxyCODONE-acetaminophen (PERCOCET) 5-325 MG per tablet Take 1 tablet by mouth every 8 hours as needed for Pain.  albuterol sulfate  (90 Base) MCG/ACT inhaler Inhale 1 puff into the lungs every 4 hours as needed   5    Milnacipran HCl (SAVELLA PO) Take 50 mg by mouth 2 times daily       tiZANidine (ZANAFLEX) 4 MG tablet Take 4 mg by mouth every 8 hours as needed Talking 3 times a day      amitriptyline (ELAVIL) 25 MG tablet Take 25 mg by mouth nightly      omeprazole (PRILOSEC) 40 MG delayed release capsule Take 20 mg by mouth in the morning and at bedtime       montelukast (SINGULAIR) 10 MG tablet Take 10 mg by mouth nightly.  valACYclovir (VALTREX) 500 MG tablet Take 500 mg by mouth as needed       ondansetron (ZOFRAN) 8 MG tablet Take 1 tablet by mouth every 8 hours as needed for Nausea or Vomiting 30 tablet 0    lidocaine (LMX) 4 % cream       diclofenac sodium 1 % GEL Apply 2 g topically 4 times daily as needed for Pain Using 4 percent      Elastic Bandages & Supports (KNEE BRACE) MISC Hinged knee brace to left knee 1 each 0       Allergies:     Allergies   Allergen Reactions    Aspirin Anaphylaxis and Nausea Only    Mobic [Meloxicam] Other (See Comments)     \"Almost a stroke, couldn't talk, right side numbness\"    Other      STATES ALL STEROIDS  cause  Migraine,.rash.& difficulty breathing    Cortisone Hives, Nausea And Vomiting and Other (See Comments)      heart rate goes up, massive migraines facial swelling      Darvocet A500 [Propoxyphene N-Acetaminophen] Hives, Nausea And Vomiting and Other (See Comments)     Massive migraines    Folic Acid Hives    Gabapentin Other (See Comments)     Dizziness    Propoxyphene Hives     hives    Tape [Adhesive Tape] Dermatitis     \"skin spots\"       Problem List:    Patient Active Problem List   Diagnosis Code    Asthma J45.909    Migraines G43.909    SLE (systemic lupus erythematosus) ([facility]) M32.9    Primary osteoarthritis of right knee M17.11    Primary osteoarthritis of left knee M17.12    Displacement of lumbar intervertebral disc M51.26    Neural foraminal stenosis of lumbar spine M48.061    Lumbar facet arthropathy M47.816    Morbid obesity ([facility]) E66.01    Essential hypertension I10    Type 2 diabetes mellitus without complication, without long-term current use of insulin ([facility]) E11.9    Pure hypercholesterolemia E78.00    Soft tissue neoplasm D49.2    Anemia D64.9       Past Medical History:        Diagnosis Date    Anxiety and depression     Arthritis     Asthma     Bulging lumbar disc     Cervical disc disease     Diabetes mellitus ([facility])     diet controlled    Ectopic pregnancy     Fasciitis     Right    Fibromyalgia     GERD (gastroesophageal reflux disease)     Heart murmur     benign    Herpes genitalia     Hyperlipidemia     Hypertension     IBS (irritable bowel syndrome)     Migraines     Ovarian cyst     PONV (postoperative nausea and vomiting)     with anesthesia    Stage 3 chronic kidney disease ([address]     follows w Dr Chevy Hammerfolk Thyroid disease        Past Surgical History:        Procedure Laterality Date    CERVICAL FUSION  07/30/2014    anterior cervical discetomy fusion C6-C7    CERVICAL SPINE SURGERY      has a plate    COLONOSCOPY      DILATION AND CURETTAGE OF UTERUS      ECHO COMPL W DOP COLOR FLOW  08/24/2013         ENDOSCOPY, COLON, DIAGNOSTIC      KNEE ARTHROSCOPY Right 2014    KNEE ARTHROSCOPY Right 04/01/2016    Arthroscopy right knee chondroplasty synovectomy medial and lateral menisectomy    KNEE ARTHROSCOPY Left 12/16/2016    left knee arthoscopy medial meniscectomy chodroplasty synovectomy    KNEE ARTHROSCOPY Right 07/10/2020    RIGHT KNEE ARTHROSCOPY, MEDIAL MENISCECTOMY AND DEBRIDEMENT performed by Paramjit Otoole DO at [address] Right 5/26/2021    EXCISION SOFT TISSUE NEOPLASM RIGTH LEG performed by Jose F Senior MD at [address] Bilateral 12/06/2016    lumbar facet #1    OTHER SURGICAL HISTORY      partial rt  fallopian tube removed    MN SHOULDER SCOPE BONE SHAVING Left 04/12/2018    LEFT SHOULDER ARTHROSCOPY, SUBACROMIAL DECOMPRESSION AND DEBRIDEMENT performed by Paramjit Otoole DO at [address] ARTHROSCOPY Right 01/03/2020    RIGHT SHOULDER ARTHROSCOPY, SUBACROMIAL DECOMPRESSION AND DEBRIDEMENT LABRIUM performed by Paramjit Otoole DO at [address]         Social History:    Social History     Tobacco Use    Smoking status: Never Smoker    Smokeless tobacco: Never Used   Substance Use Topics    Alcohol use: No                                Counseling given: Not Answered      Vital Signs (Current):   Vitals:    03/04/22 1128   Weight: 240 lb (108.9 kg)   Height: 5' 3\" (1.6 m)                                              BP Readings from Last 3 Encounters:   02/24/22 (!) 143/105   01/27/22 (!) 125/90   01/13/22 124/74       NPO Status:  >8.H                                                                               BMI:   Wt Readings from Last 3 Encounters:   02/28/22 241 lb (109.3 kg)   02/24/22 241 lb 6.4 oz (109.5 kg)   02/08/22 240 lb (108.9 kg)     Body mass index is 42.51 kg/m².     CBC:   Lab Results   Component Value Date    WBC 5.2 03/07/2022    RBC 4.43 03/07/2022    HGB 13.7 03/07/2022    HCT 44.2 03/07/2022    MCV 99.8 03/07/2022    RDW 15.7 03/07/2022     03/07/2022       CMP:   Lab Results   Component Value Date     03/07/2022    K 3.9 03/07/2022    K 4.1 05/21/2021     03/07/2022    CO2 23 03/07/2022    BUN 16 03/07/2022    CREATININE 1.0 03/07/2022    GFRAA >60 03/07/2022    LABGLOM >60 03/07/2022    GLUCOSE 104 03/07/2022    PROT 6.9 11/07/2020    CALCIUM 9.5 03/07/2022    BILITOT <0.2 11/07/2020    ALKPHOS 87 11/07/2020    AST 20 11/07/2020    ALT 17 11/07/2020       POC Tests: No results for input(s): POCGLU, POCNA, POCK, POCCL, POCBUN, POCHEMO, POCHCT in the last 72 hours. Coags:   Lab Results   Component Value Date    PROTIME 13.6 07/31/2018    INR 1.2 07/31/2018    APTT 39.9 07/25/2014       HCG (If Applicable):   Lab Results   Component Value Date    PREGTESTUR NEGATIVE 05/26/2021        ABGs: No results found for: PHART, PO2ART, ZGT9SEU, FFP0GND, BEART, K3YXBHUB     Type & Screen (If Applicable):  No results found for: LABABO, LABRH    Drug/Infectious Status (If Applicable):  No results found for: HIV, HEPCAB    COVID-19 Screening (If Applicable):   Lab Results   Component Value Date    COVID19 Not Detected 03/07/2022           Anesthesia Evaluation  Patient summary reviewed   history of anesthetic complications: PONV. Airway: Mallampati: III  TM distance: >3 FB   Neck ROM: limited  Mouth opening: > = 3 FB Dental:      Comment: Dentition intact,patient denies any loose teeth. Pulmonary: breath sounds clear to auscultation  (+) asthma:     (-) not a current smoker                           Cardiovascular:  Exercise tolerance: good (>4 METS),   (+) hypertension:, hyperlipidemia      ECG reviewed  Rhythm: regular  Rate: normal                    Neuro/Psych:   (+) neuromuscular disease:, headaches: migraine headaches, psychiatric history:            GI/Hepatic/Renal:   (+) GERD:, renal disease: CRI, morbid obesity         ROS comment: IBS (irritable bowel syndrome). Endo/Other:    (+) DiabetesType II DM, , hypothyroidism: arthritis: OA., .                 Abdominal:   (+) obese,           Vascular: negative vascular ROS.          Other Findings:             Anesthesia Plan      general ASA 3     (PCP note in chart-reviewed)  Induction: intravenous. MIPS: Postoperative opioids intended and Prophylactic antiemetics administered. Anesthetic plan and risks discussed with patient. Plan discussed with CRNA. Attending anesthesiologist reviewed and agrees with Preprocedure content    PAT Chart Review:  Chart reviewed per routine by Ruddy Boo MD.  Above represents information available via shared medical record including previous anesthesia history, drug and allergy history.   Confirmation of above and final plan per Day of Surgery (DOS) anesthesiologist.      Ruddy Boo MD   3/10/2022

## 2022-03-11 ENCOUNTER — HOSPITAL ENCOUNTER (OUTPATIENT)
Age: 42
Setting detail: OUTPATIENT SURGERY
Discharge: HOME | End: 2022-03-11
Attending: ORTHOPAEDIC SURGERY | Admitting: ORTHOPAEDIC SURGERY
Payer: COMMERCIAL

## 2022-03-11 ENCOUNTER — ANESTHESIA (OUTPATIENT)
Dept: OPERATING ROOM | Age: 42
End: 2022-03-11
Payer: COMMERCIAL

## 2022-03-11 VITALS
HEIGHT: 63 IN | SYSTOLIC BLOOD PRESSURE: 114 MMHG | DIASTOLIC BLOOD PRESSURE: 73 MMHG | BODY MASS INDEX: 42.91 KG/M2 | TEMPERATURE: 97.5 F | OXYGEN SATURATION: 97 % | RESPIRATION RATE: 14 BRPM | HEART RATE: 82 BPM | WEIGHT: 242.2 LBS

## 2022-03-11 VITALS
RESPIRATION RATE: 18 BRPM | OXYGEN SATURATION: 98 % | SYSTOLIC BLOOD PRESSURE: 82 MMHG | DIASTOLIC BLOOD PRESSURE: 62 MMHG | TEMPERATURE: 98.6 F

## 2022-03-11 DIAGNOSIS — M17.12 OSTEOARTHRITIS OF LEFT KNEE, UNSPECIFIED OSTEOARTHRITIS TYPE: Primary | ICD-10-CM

## 2022-03-11 LAB
HCG, URINE, POC: NEGATIVE
Lab: NORMAL
METER GLUCOSE: 96 MG/DL (ref 74–99)
NEGATIVE QC PASS/FAIL: NORMAL
POSITIVE QC PASS/FAIL: NORMAL

## 2022-03-11 PROCEDURE — 6370000000 HC RX 637 (ALT 250 FOR IP): Performed by: ANESTHESIOLOGY

## 2022-03-11 PROCEDURE — 2709999900 HC NON-CHARGEABLE SUPPLY: Performed by: ORTHOPAEDIC SURGERY

## 2022-03-11 PROCEDURE — 3600000003 HC SURGERY LEVEL 3 BASE: Performed by: ORTHOPAEDIC SURGERY

## 2022-03-11 PROCEDURE — 7100000011 HC PHASE II RECOVERY - ADDTL 15 MIN: Performed by: ORTHOPAEDIC SURGERY

## 2022-03-11 PROCEDURE — 2720000010 HC SURG SUPPLY STERILE: Performed by: ORTHOPAEDIC SURGERY

## 2022-03-11 PROCEDURE — 2580000003 HC RX 258: Performed by: ANESTHESIOLOGY

## 2022-03-11 PROCEDURE — 2500000003 HC RX 250 WO HCPCS: Performed by: ORTHOPAEDIC SURGERY

## 2022-03-11 PROCEDURE — 6360000002 HC RX W HCPCS: Performed by: ANESTHESIOLOGY

## 2022-03-11 PROCEDURE — 3600000013 HC SURGERY LEVEL 3 ADDTL 15MIN: Performed by: ORTHOPAEDIC SURGERY

## 2022-03-11 PROCEDURE — 3700000001 HC ADD 15 MINUTES (ANESTHESIA): Performed by: ORTHOPAEDIC SURGERY

## 2022-03-11 PROCEDURE — 7100000000 HC PACU RECOVERY - FIRST 15 MIN: Performed by: ORTHOPAEDIC SURGERY

## 2022-03-11 PROCEDURE — 3700000000 HC ANESTHESIA ATTENDED CARE: Performed by: ORTHOPAEDIC SURGERY

## 2022-03-11 PROCEDURE — 82962 GLUCOSE BLOOD TEST: CPT | Performed by: ORTHOPAEDIC SURGERY

## 2022-03-11 PROCEDURE — 7100000001 HC PACU RECOVERY - ADDTL 15 MIN: Performed by: ORTHOPAEDIC SURGERY

## 2022-03-11 PROCEDURE — 82962 GLUCOSE BLOOD TEST: CPT

## 2022-03-11 PROCEDURE — 29876 ARTHRS KNEE SURG SYNVCT MAJ: CPT | Performed by: ORTHOPAEDIC SURGERY

## 2022-03-11 PROCEDURE — 6360000002 HC RX W HCPCS: Performed by: NURSE ANESTHETIST, CERTIFIED REGISTERED

## 2022-03-11 PROCEDURE — 2580000003 HC RX 258: Performed by: ORTHOPAEDIC SURGERY

## 2022-03-11 PROCEDURE — 7100000010 HC PHASE II RECOVERY - FIRST 15 MIN: Performed by: ORTHOPAEDIC SURGERY

## 2022-03-11 PROCEDURE — 81025 URINE PREGNANCY TEST: CPT | Performed by: ORTHOPAEDIC SURGERY

## 2022-03-11 PROCEDURE — 2500000003 HC RX 250 WO HCPCS: Performed by: NURSE ANESTHETIST, CERTIFIED REGISTERED

## 2022-03-11 RX ORDER — FAMOTIDINE 20 MG/1
20 TABLET, FILM COATED ORAL ONCE
Status: COMPLETED | OUTPATIENT
Start: 2022-03-11 | End: 2022-03-11

## 2022-03-11 RX ORDER — OXYCODONE HYDROCHLORIDE AND ACETAMINOPHEN 5; 325 MG/1; MG/1
1 TABLET ORAL EVERY 4 HOURS PRN
Status: DISCONTINUED | OUTPATIENT
Start: 2022-03-11 | End: 2022-03-11 | Stop reason: HOSPADM

## 2022-03-11 RX ORDER — MORPHINE SULFATE 2 MG/ML
1 INJECTION, SOLUTION INTRAMUSCULAR; INTRAVENOUS EVERY 5 MIN PRN
Status: DISCONTINUED | OUTPATIENT
Start: 2022-03-11 | End: 2022-03-11 | Stop reason: HOSPADM

## 2022-03-11 RX ORDER — FENTANYL CITRATE 50 UG/ML
INJECTION, SOLUTION INTRAMUSCULAR; INTRAVENOUS PRN
Status: DISCONTINUED | OUTPATIENT
Start: 2022-03-11 | End: 2022-03-11 | Stop reason: SDUPTHER

## 2022-03-11 RX ORDER — DIPHENHYDRAMINE HYDROCHLORIDE 50 MG/ML
12.5 INJECTION INTRAMUSCULAR; INTRAVENOUS
Status: DISCONTINUED | OUTPATIENT
Start: 2022-03-11 | End: 2022-03-11 | Stop reason: HOSPADM

## 2022-03-11 RX ORDER — MIDAZOLAM HYDROCHLORIDE 1 MG/ML
INJECTION INTRAMUSCULAR; INTRAVENOUS PRN
Status: DISCONTINUED | OUTPATIENT
Start: 2022-03-11 | End: 2022-03-11 | Stop reason: SDUPTHER

## 2022-03-11 RX ORDER — LIDOCAINE HYDROCHLORIDE 20 MG/ML
INJECTION, SOLUTION INTRAVENOUS PRN
Status: DISCONTINUED | OUTPATIENT
Start: 2022-03-11 | End: 2022-03-11 | Stop reason: SDUPTHER

## 2022-03-11 RX ORDER — SODIUM CHLORIDE, SODIUM LACTATE, POTASSIUM CHLORIDE, CALCIUM CHLORIDE 600; 310; 30; 20 MG/100ML; MG/100ML; MG/100ML; MG/100ML
INJECTION, SOLUTION INTRAVENOUS CONTINUOUS
Status: DISCONTINUED | OUTPATIENT
Start: 2022-03-11 | End: 2022-03-11 | Stop reason: HOSPADM

## 2022-03-11 RX ORDER — SODIUM CHLORIDE 0.9 % (FLUSH) 0.9 %
5-40 SYRINGE (ML) INJECTION PRN
Status: DISCONTINUED | OUTPATIENT
Start: 2022-03-11 | End: 2022-03-11 | Stop reason: HOSPADM

## 2022-03-11 RX ORDER — MAGNESIUM HYDROXIDE 1200 MG/15ML
LIQUID ORAL CONTINUOUS PRN
Status: COMPLETED | OUTPATIENT
Start: 2022-03-11 | End: 2022-03-11

## 2022-03-11 RX ORDER — METOCLOPRAMIDE 10 MG/1
10 TABLET ORAL ONCE
Status: COMPLETED | OUTPATIENT
Start: 2022-03-11 | End: 2022-03-11

## 2022-03-11 RX ORDER — OXYCODONE HYDROCHLORIDE AND ACETAMINOPHEN 5; 325 MG/1; MG/1
TABLET ORAL
Status: DISCONTINUED
Start: 2022-03-11 | End: 2022-03-11 | Stop reason: HOSPADM

## 2022-03-11 RX ORDER — DIPHENHYDRAMINE HYDROCHLORIDE 50 MG/ML
INJECTION INTRAMUSCULAR; INTRAVENOUS PRN
Status: DISCONTINUED | OUTPATIENT
Start: 2022-03-11 | End: 2022-03-11 | Stop reason: SDUPTHER

## 2022-03-11 RX ORDER — ONDANSETRON 2 MG/ML
INJECTION INTRAMUSCULAR; INTRAVENOUS PRN
Status: DISCONTINUED | OUTPATIENT
Start: 2022-03-11 | End: 2022-03-11 | Stop reason: SDUPTHER

## 2022-03-11 RX ORDER — GLYCOPYRROLATE 1 MG/5 ML
SYRINGE (ML) INTRAVENOUS PRN
Status: DISCONTINUED | OUTPATIENT
Start: 2022-03-11 | End: 2022-03-11 | Stop reason: SDUPTHER

## 2022-03-11 RX ORDER — SODIUM CHLORIDE 0.9 % (FLUSH) 0.9 %
5-40 SYRINGE (ML) INJECTION EVERY 12 HOURS SCHEDULED
Status: DISCONTINUED | OUTPATIENT
Start: 2022-03-11 | End: 2022-03-11 | Stop reason: HOSPADM

## 2022-03-11 RX ORDER — SODIUM CHLORIDE 9 MG/ML
25 INJECTION, SOLUTION INTRAVENOUS PRN
Status: DISCONTINUED | OUTPATIENT
Start: 2022-03-11 | End: 2022-03-11 | Stop reason: HOSPADM

## 2022-03-11 RX ORDER — BUPIVACAINE HYDROCHLORIDE 2.5 MG/ML
INJECTION, SOLUTION EPIDURAL; INFILTRATION; INTRACAUDAL PRN
Status: DISCONTINUED | OUTPATIENT
Start: 2022-03-11 | End: 2022-03-11 | Stop reason: ALTCHOICE

## 2022-03-11 RX ORDER — FENTANYL CITRATE 50 UG/ML
50 INJECTION, SOLUTION INTRAMUSCULAR; INTRAVENOUS EVERY 5 MIN PRN
Status: DISCONTINUED | OUTPATIENT
Start: 2022-03-11 | End: 2022-03-11 | Stop reason: HOSPADM

## 2022-03-11 RX ORDER — PROPOFOL 10 MG/ML
INJECTION, EMULSION INTRAVENOUS PRN
Status: DISCONTINUED | OUTPATIENT
Start: 2022-03-11 | End: 2022-03-11 | Stop reason: SDUPTHER

## 2022-03-11 RX ORDER — MEPERIDINE HYDROCHLORIDE 25 MG/ML
12.5 INJECTION INTRAMUSCULAR; INTRAVENOUS; SUBCUTANEOUS EVERY 5 MIN PRN
Status: DISCONTINUED | OUTPATIENT
Start: 2022-03-11 | End: 2022-03-11 | Stop reason: HOSPADM

## 2022-03-11 RX ORDER — DEXAMETHASONE SODIUM PHOSPHATE 10 MG/ML
INJECTION, SOLUTION INTRAMUSCULAR; INTRAVENOUS PRN
Status: DISCONTINUED | OUTPATIENT
Start: 2022-03-11 | End: 2022-03-11 | Stop reason: SDUPTHER

## 2022-03-11 RX ADMIN — PROPOFOL 200 MG: 10 INJECTION, EMULSION INTRAVENOUS at 08:24

## 2022-03-11 RX ADMIN — MORPHINE SULFATE 1 MG: 2 INJECTION, SOLUTION INTRAMUSCULAR; INTRAVENOUS at 09:43

## 2022-03-11 RX ADMIN — Medication 0.2 MG: at 08:24

## 2022-03-11 RX ADMIN — SODIUM CHLORIDE, POTASSIUM CHLORIDE, SODIUM LACTATE AND CALCIUM CHLORIDE: 600; 310; 30; 20 INJECTION, SOLUTION INTRAVENOUS at 07:44

## 2022-03-11 RX ADMIN — ONDANSETRON 4 MG: 2 INJECTION INTRAMUSCULAR; INTRAVENOUS at 08:32

## 2022-03-11 RX ADMIN — FENTANYL CITRATE 50 MCG: 50 INJECTION, SOLUTION INTRAMUSCULAR; INTRAVENOUS at 08:31

## 2022-03-11 RX ADMIN — DEXAMETHASONE SODIUM PHOSPHATE 10 MG: 10 INJECTION, SOLUTION INTRAMUSCULAR; INTRAVENOUS at 08:27

## 2022-03-11 RX ADMIN — OXYCODONE AND ACETAMINOPHEN 1 TABLET: 5; 325 TABLET ORAL at 09:54

## 2022-03-11 RX ADMIN — MORPHINE SULFATE 1 MG: 2 INJECTION, SOLUTION INTRAMUSCULAR; INTRAVENOUS at 09:36

## 2022-03-11 RX ADMIN — METOCLOPRAMIDE 10 MG: 10 TABLET ORAL at 07:30

## 2022-03-11 RX ADMIN — FAMOTIDINE 20 MG: 20 TABLET ORAL at 07:30

## 2022-03-11 RX ADMIN — FENTANYL CITRATE 50 MCG: 50 INJECTION, SOLUTION INTRAMUSCULAR; INTRAVENOUS at 08:43

## 2022-03-11 RX ADMIN — LIDOCAINE HYDROCHLORIDE 50 MG: 20 INJECTION, SOLUTION INTRAVENOUS at 08:24

## 2022-03-11 RX ADMIN — DIPHENHYDRAMINE HYDROCHLORIDE 12.5 MG: 50 INJECTION, SOLUTION INTRAMUSCULAR; INTRAVENOUS at 08:29

## 2022-03-11 RX ADMIN — MIDAZOLAM 2 MG: 1 INJECTION INTRAMUSCULAR; INTRAVENOUS at 08:22

## 2022-03-11 RX ADMIN — FENTANYL CITRATE 50 MCG: 50 INJECTION, SOLUTION INTRAMUSCULAR; INTRAVENOUS at 08:24

## 2022-03-11 ASSESSMENT — PULMONARY FUNCTION TESTS
PIF_VALUE: 10
PIF_VALUE: 14
PIF_VALUE: 2
PIF_VALUE: 13
PIF_VALUE: 12
PIF_VALUE: 13
PIF_VALUE: 13
PIF_VALUE: 14
PIF_VALUE: 15
PIF_VALUE: 14
PIF_VALUE: 12
PIF_VALUE: 9
PIF_VALUE: 13
PIF_VALUE: 14
PIF_VALUE: 13
PIF_VALUE: 16
PIF_VALUE: 12
PIF_VALUE: 12
PIF_VALUE: 13
PIF_VALUE: 13
PIF_VALUE: 15
PIF_VALUE: 13
PIF_VALUE: 12
PIF_VALUE: 13
PIF_VALUE: 13
PIF_VALUE: 14
PIF_VALUE: 12
PIF_VALUE: 13
PIF_VALUE: 12
PIF_VALUE: 13
PIF_VALUE: 0
PIF_VALUE: 9
PIF_VALUE: 12
PIF_VALUE: 12
PIF_VALUE: 2
PIF_VALUE: 16
PIF_VALUE: 12
PIF_VALUE: 13
PIF_VALUE: 13
PIF_VALUE: 5
PIF_VALUE: 12
PIF_VALUE: 13
PIF_VALUE: 1
PIF_VALUE: 13
PIF_VALUE: 13
PIF_VALUE: 12

## 2022-03-11 ASSESSMENT — PAIN SCALES - GENERAL
PAINLEVEL_OUTOF10: 6
PAINLEVEL_OUTOF10: 6
PAINLEVEL_OUTOF10: 3
PAINLEVEL_OUTOF10: 7
PAINLEVEL_OUTOF10: 3
PAINLEVEL_OUTOF10: 7

## 2022-03-11 ASSESSMENT — PAIN DESCRIPTION - PAIN TYPE
TYPE: SURGICAL PAIN

## 2022-03-11 ASSESSMENT — LIFESTYLE VARIABLES: SMOKING_STATUS: 0

## 2022-03-11 ASSESSMENT — PAIN - FUNCTIONAL ASSESSMENT: PAIN_FUNCTIONAL_ASSESSMENT: 0-10

## 2022-03-11 NOTE — OP NOTE
Operative Note      Patient: Anna Marie Hatfield  YOB: 1980  MRN: [id number]    Date of Procedure: 3/11/2022    Pre-Op Diagnosis: LEFT KNEE DJD    Post-Op Diagnosis: Same       Procedure(s):  Left knee arthroscopy and debridement, chondroplasty patella/mfc/lfc    Surgeon(s): Christian Cruz DO    Assistant:   Resident: Nydai Ornelas DO; Castillo Malik DO    Anesthesia: General    Estimated Blood Loss (mL): Minimal    Complications: None    Specimens:   * No specimens in log *    Implants:  * No implants in log *      Drains: * No LDAs found *    Findings: as above    Detailed Description of Procedure:   below      PREOPERATIVE DIAGNOSES: (1) Left knee Tricompartmental synovitis. (2) DJD knee   POSTOPERATIVE DIAGNOSIS: Same as above +  PROCEDURE: (1) Left knee arthroscopy with Tricompartmental synovectomy. (2) Chondroplasty of patella/mfc/lfc  ANESTHESIA: general  ESTIMATED BLOOD LOSS: Minimal.   COMPLICATIONS: None. OPERATIVE PROCEDURE: The patient was taken to the operative suite and was   given general anesthesia. The Left knee was identified with preoperative time-out. I applied a tourniquet to the  thigh, placed the  leg in a legholder, prepped and draped the leg in sterile fashion. I outlined an   incision along the anteromedial and anterolateral aspects of the knee. An incision was then made in the anterior medial and lateral aspects of the knee with an 11 blade. A blunt trocar was then inserted within the knee and I carried out a diagnostic arthroscopy. The patient had evidence of synovitis within the suprapatellar pouch, medial and lateral aspects of the knee. There was a large suprapatellar, medial and infrapatellar plica. The patellar surface was grade III-Iv 2x3cm  and trochlear surface was stable. I then advanced the scope into the intracondylar notch. The patients ACL/ PCL were intact. I then advanced the scope into the medial compartment.  The medial femoral condyle had grade II-III defects measuring 1x2cm. The medial tibial plateau had stable defects measuring 0. There was not tear involving the body and posterior horn of the medial meniscus which was unstable to probing. I then advanced the scope into the lateral compartment compartment. The lateral femoral condyle had grade I-II defects measuring 1x1cmn. The lateral tibial plateau had stable defects measuring 0. There was not tear involving the body and posterior horn of the lateral meniscus which was unstable to probing. I then established a medial working portal and carried out a tricompartmental   synovectomy removing angry synovium from the suprapatellar pouch, medial and   lateral compartments. The suprapatellar/medial and infrapatellar plica were removed using the 4-0 shaver. The articular surface defects involving the mfc/lfc/patella  were performed using the 4.0 Aggressive Plus shaver in a forward manner, smoothed all unstable articular cartilage. The incisions were then closed with a 4-0 Prolene in single interrupted fashion. A   sterile dressing was placed on the wound. Patient recovered in the recovery   room without difficulty.          Electronically signed by Gemini Maxwell DO on 3/11/2022 at 9:17 AM

## 2022-03-11 NOTE — ANESTHESIA POSTPROCEDURE EVALUATION
Department of Anesthesiology  Postprocedure Note    Patient: Viktoriya Irwin  MRN: [id number]  [place]: 1980  Date of evaluation: 3/11/2022  Time:  10:42 AM     Procedure Summary     Date: 03/11/22 Room / Location: [address] [place] [address] / [address] [place] [address]    Anesthesia Start: 3888 Anesthesia Stop: 9132    Procedure: Left knee arthroscopy and debridement (Left ) Diagnosis: (LEFT KNEE DJD)    Surgeons: Juwan Oliva DO Responsible Provider: Charley Back MD    Anesthesia Type: general ASA Status: 3          Anesthesia Type: general    Peter Phase I: Peter Score: 10    Peter Phase II: Peter Score: 10    Last vitals: Reviewed and per EMR flowsheets.        Anesthesia Post Evaluation    Patient location during evaluation: PACU  Patient participation: complete - patient participated  Level of consciousness: awake and alert  Airway patency: patent  Nausea & Vomiting: no nausea and no vomiting  Complications: no  Cardiovascular status: blood pressure returned to baseline  Respiratory status: room air and spontaneous ventilation  Hydration status: stable
Statement Selected

## 2022-03-11 NOTE — H&P
Updated H&P    Chief Complaint   Patient presents with    Knee Pain       Left knee MRI follow up         Angela Ferris returns today for follow-up of her left knee pain. she reports this is worse than when I saw her last.  The patient's pain level is a 6/10. The previous treatment was a scope on 12/16/16 that was successful till recently. Patient c/o mechanical symptoms like locking and catching.  She is here today for mri results.      Past Medical History        Past Medical History:   Diagnosis Date    Anxiety and depression      Arthritis      Asthma      Bulging lumbar disc      Cervical disc disease      Diabetes mellitus ([address]       diet controlled    Ectopic pregnancy      Fasciitis       Right    Fibromyalgia      GERD (gastroesophageal reflux disease)      Heart murmur       benign    Herpes genitalia      Hyperlipidemia      Hypertension      IBS (irritable bowel syndrome)      Migraines      Ovarian cyst      PONV (postoperative nausea and vomiting)      Stage 3 chronic kidney disease (HCC)      Thyroid disease           Past Surgical History         Past Surgical History:   Procedure Laterality Date    CERVICAL FUSION   07/30/2014     anterior cervical discetomy fusion C6-C7    CERVICAL SPINE SURGERY         has a plate    COLONOSCOPY        DILATION AND CURETTAGE OF UTERUS        ECHO COMPL W DOP COLOR FLOW   08/24/2013          ENDOSCOPY, COLON, DIAGNOSTIC        KNEE ARTHROSCOPY Right 2014    KNEE ARTHROSCOPY Right 04/01/2016     Arthroscopy right knee chondroplasty synovectomy medial and lateral menisectomy    KNEE ARTHROSCOPY Left 12/16/2016     left knee arthoscopy medial meniscectomy chodroplasty synovectomy    KNEE ARTHROSCOPY Right 07/10/2020     RIGHT KNEE ARTHROSCOPY, MEDIAL MENISCECTOMY AND DEBRIDEMENT performed by Ting Perea DO at [address] [place] [address] Right 5/26/2021     EXCISION SOFT TISSUE NEOPLASM RIGTH LEG performed by Jaci Castillo Jay Moreno MD at [address] Bilateral 12/06/2016     lumbar facet #1    OTHER SURGICAL HISTORY         partial rt  fallopian tube removed    CO SHOULDER SCOPE BONE SHAVING Left 04/12/2018     LEFT SHOULDER ARTHROSCOPY, SUBACROMIAL DECOMPRESSION AND DEBRIDEMENT performed by José Miguel Zaldivar DO at [address] ARTHROSCOPY Right 01/03/2020     RIGHT SHOULDER ARTHROSCOPY, SUBACROMIAL DECOMPRESSION AND DEBRIDEMENT LABRIUM performed by José Miguel Zaldivar DO at [address]               Current Medication      Current Outpatient Medications:     topiramate (TOPAMAX) 100 MG tablet, Take 100 mg by mouth 2 times daily, Disp: , Rfl:     ondansetron (ZOFRAN) 8 MG tablet, Take 1 tablet by mouth every 8 hours as needed for Nausea or Vomiting, Disp: 30 tablet, Rfl: 0    Ubrogepant (UBRELVY) 100 MG TABS, Take 100 mg by mouth daily as needed (migraine) May repeat dose in 2 hours if first ineffective; no more than 2 doses in 24 hours, Disp: 10 tablet, Rfl: 3    Multiple Vitamins-Minerals (THERAPEUTIC MULTIVITAMIN-MINERALS) tablet, Take 1 tablet by mouth daily, Disp: , Rfl:     Cetirizine HCl (ZYRTEC PO), Take by mouth as needed, Disp: , Rfl:     lidocaine (LMX) 4 % cream, , Disp: , Rfl:     metFORMIN (GLUCOPHAGE) 500 MG tablet, Take 500 mg by mouth daily (with breakfast) , Disp: , Rfl:     levothyroxine (SYNTHROID) 150 MCG tablet, Take by mouth Daily , Disp: , Rfl:     vitamin D (ERGOCALCIFEROL) 1.25 MG (96271 UT) CAPS capsule, once a week , Disp: , Rfl:     simvastatin (ZOCOR) 5 MG tablet, , Disp: , Rfl:     atenolol (TENORMIN) 25 MG tablet, Take 1 tablet by mouth daily (Patient taking differently: Take 25 mg by mouth daily Takes 1 1/2 tabs), Disp: 30 tablet, Rfl: 0    Cholecalciferol (VITAMIN D3) 50 MCG (2000 UT) CAPS, Take by mouth daily, Disp: , Rfl:     oxyCODONE-acetaminophen (PERCOCET) 5-325 MG per tablet, Take 1 tablet by mouth every 8 hours as needed for Pain., Disp: , Rfl:     albuterol sulfate  (90 Base) MCG/ACT inhaler, Inhale 1 puff into the lungs every 4 hours as needed , Disp: , Rfl: 5    Milnacipran HCl (SAVELLA PO), Take 50 mg by mouth 2 times daily , Disp: , Rfl:     diclofenac sodium 1 % GEL, Apply 2 g topically 4 times daily as needed for Pain Using 4 percent, Disp: , Rfl:     tiZANidine (ZANAFLEX) 4 MG tablet, Take 4 mg by mouth every 8 hours as needed Talking 3 times a day, Disp: , Rfl:     amitriptyline (ELAVIL) 25 MG tablet, Take 25 mg by mouth nightly, Disp: , Rfl:     Elastic Bandages & Supports (KNEE BRACE) MISC, Hinged knee brace to left knee, Disp: 1 each, Rfl: 0    omeprazole (PRILOSEC) 40 MG delayed release capsule, Take 20 mg by mouth daily , Disp: , Rfl:     montelukast (SINGULAIR) 10 MG tablet, Take 10 mg by mouth nightly., Disp: , Rfl:     valACYclovir (VALTREX) 500 MG tablet, Take 500 mg by mouth as needed , Disp: , Rfl:            Allergies   Allergen Reactions    Aspirin Anaphylaxis and Nausea Only    Mobic [Meloxicam] Other (See Comments)       \"Almost a stroke, couldn't talk, right side numbness\"    Cortisone Hives, Nausea And Vomiting and Other (See Comments)        heart rate goes up, massive migraines facial swelling       Darvocet A500 [Propoxyphene N-Acetaminophen] Hives, Nausea And Vomiting and Other (See Comments)       Massive migraines    Folic Acid Hives    Gabapentin Other (See Comments)       Dizziness    Propoxyphene Hives       hives    Tape [Adhesive Tape] Dermatitis       \"skin spots\"      Social History               Socioeconomic History    Marital status:        Spouse name: Not on file    Number of children: Not on file    Years of education: Not on file    Highest education level: Not on file   Occupational History    Not on file   Tobacco Use    Smoking status: Never Smoker    Smokeless tobacco: Never Used   Vaping Use    Vaping Use: Never used   Substance and (-) fractures,  (-) dislocations,(-) collagen vascular disease, (-) fibromyalgia, (-) multiple sclerosis, (-) muscular dystrophy, (-) RSD,(-) joint pain (-)swelling, (-) joint pain,swelling. Neurologic: (-) epilepsy, (-)seizures,(-) brain tumor,(-) TIA, (-)stroke, (-)headaches, (-)Parkinson disease,(-) memory loss, (-) LOC. Cardiovascular: (-) Chest pain, (-) swelling in legs/feet, (-) SOB, (-) cramping in legs/feet with walking.     Constitutional:    Vital signs are stable.  In general, patient is awake, alert and oriented X3, in no apparent distress.  Examination of HENT reveals normocephalic, atraumatic.  PERRLA/EOMI sclera are white.  Conjunctivae are clear.  TM's are intact.  Pharynx is pink and moist.  Uvula and tongue are midline.  Heart: Positive S1 and positive S2 with regular rate and rhythm.  Lungs: Clear to auscultation bilaterally without rales, rhonchi or wheezes.  Abdomen: soft, nontender.  Positive bowel sounds.  No organomegaly.  No guarding or rigidity.        The patient is alert and oriented x 3, appears to be stated age and in no distress. /80   Pulse 80   Temp 97.5 °F (36.4 °C) (Skin)   Resp 18   Ht 5' 3\" (1.6 m)   Wt 242 lb 3.2 oz (109.9 kg)   LMP 03/07/2022   SpO2 99%   BMI 42.90 kg/m²        Skin:  Upon inspection: the skin appears warm, dry and intact. There is not a previous scar over the affected area. There is not any cellulitis, lymphedema or cutaneous lesions noted in the lower extremities. Upon palpation there is no induration noted.       Neurologic:  Gait: normal;  Motor exam of the lower extremities show ; quadriceps, hamstrings, foot dorsi and plantar flexors intact R.  5/5 and L. 5/5. Deep tendon reflexes are 2/4 at the knees and 2/4 at the ankles with strong extensor hallicus longus motor strength bilaterally. Sensory to both feet is intact to all sensory roots.     Cardiovascular: The vascular exam is normal and is well perfused to distal extremities. Distal pulses DP/PT: R. 2+; L. 2+. There is cap refill noted less than two seconds in all digits. There is not edema of the bilateral lower extremities. There is not varicosities noted in the distal extremities.       Lymph:  Upon palpation,  there is no lymphadenopathy noted in bilateral lower extremities.       Musculoskeletal:  Gait: antalgic; examination of the nails and digits reveal no cyanosis or clubbing     Lumbar exam:  On visual inspection, there is no deformity of the spine. full range of motion, no tenderness, palpable spasm or pain on motion. Special tests: Straight Leg Raise negative, Omer testnegative.     Hip exam:  Upon inspection, there is no deformity noted. Upon palpation there is not tenderness. ROM: is   full and semetrical.   Strength: Hip Flexors 5/5; Hip Abductors 5/5; Hip Adduction 5/5.      Knee exam:  Left knee exam shows;  range of motion of R. Knee is 0 to 120, and L. Knee is 0 to 120. She does have  pain on motion, effusion is mild, there is tenderness over the  medial, lateral, anterior region, there are not any masses, there is not ligamentous instability, there is not  deformity noted. Knee exam: left positive for moderate crepitations, some mild tenderness laxity is not noted wit stress.       R. Knee:  Lachman's negative, Anterior Drawer negative, Posterior Drawer negative  Miranda's negative, Thallasy  negative,   PF grind test negative, Apprehension test negative, Patellar J sign  negative  L. Knee:  Lachman's negative, Anterior Drawer negative, Posterior Drawer negative  Miranda's positive, Thallasy  positive,   PF grind test positive, Apprehension test negative,  Patellar J sign  negative     Xrays:   Impression   1. There is no fracture or dislocation of the left knee.            MRI:   NO EVIDENCE OF MENISCAL TEAR OR ACUTE LIGAMENTOUS INJURY. MILD TRICOMPARTMENTAL CHONDRAL DEGENERATIVE CHANGES, GREATEST INVOLVING   THE LATERAL TIBIAL PLATEAU. Yesika Clements       Radiographic findings reviewed with patient     Impression:       Encounter Diagnosis   Name Primary?  Primary osteoarthritis of left knee Yes         Plan:   Natural history and expected course discussed. Questions answered. Educational materials distributed. Rest, ice, compression, and elevation (RICE) therapy. Reduction in offending activity. The risks and benefits of a knee arthroscopy were discussed with the patient. The risks are including but not limited to: infection, injuries to blood vessels and nerves, non relief of symptoms, arthrofibrosis of knee, need for further operative intervention, blood loss, PE/DVT, MI and death. The risks are understood by the patient and they wish to proceed with a Left knee arthroscopy and debridement.

## 2022-03-25 ENCOUNTER — TELEPHONE (OUTPATIENT)
Dept: ADMINISTRATIVE | Age: 42
End: 2022-03-25

## 2022-03-25 NOTE — TELEPHONE ENCOUNTER
Patient wrote down wrong time for appointment PO on 3/25. Needing to get stitches out, please advise for rescheduling.

## 2022-03-28 ENCOUNTER — OFFICE VISIT (OUTPATIENT)
Dept: ORTHOPEDIC SURGERY | Age: 42
End: 2022-03-28

## 2022-03-28 VITALS — BODY MASS INDEX: 42.88 KG/M2 | WEIGHT: 242 LBS | HEIGHT: 63 IN

## 2022-03-28 DIAGNOSIS — M17.12 PRIMARY OSTEOARTHRITIS OF LEFT KNEE: Primary | ICD-10-CM

## 2022-03-28 PROCEDURE — 99024 POSTOP FOLLOW-UP VISIT: CPT | Performed by: NURSE PRACTITIONER

## 2022-03-28 NOTE — PROGRESS NOTES
Subjective:        Luisito Salas is here for follow-up after left knee arthroscopy. Findings at surgery: DJD  PF 3-4, MFC 2-3, LFC 1-2. Pain is controlled with current analgesics. Medication(s) being used: oxycodone. . She denies fever, wound drainage, increasing redness, pus, increasing pain, increasing swelling. Post op problems reported: increasing pain. She is ambulating antalgic without assistive device. Objective:           General :    alert, appears stated age and cooperative   Gait:  Antalgic. Sutures:   Sutures in place and will be removed today. Incision:  healing well, no significant drainage, no dehiscence, no significant erythema   Tenderness:  moderate   Flexion ROM:  diminished range of motion   Extension ROM:  diminished range of motion   Effusion:  no   DVT Evaluation:  No evidence of DVT seen on physical exam.           Assessment:     Encounter Diagnosis   Name Primary?  Primary osteoarthritis of left knee Yes         Plan:      Surgical pictures from the surgery were reveiwed with the patient  HEP  Sutures removed today. Follow up: 4 weeks.

## 2022-04-25 RX ORDER — UBROGEPANT 100 MG/1
TABLET ORAL
Qty: 48 TABLET | Refills: 3 | Status: SHIPPED
Start: 2022-04-25 | End: 2022-06-20

## 2022-04-28 ENCOUNTER — OFFICE VISIT (OUTPATIENT)
Dept: ORTHOPEDIC SURGERY | Age: 42
End: 2022-04-28
Payer: COMMERCIAL

## 2022-04-28 ENCOUNTER — TELEPHONE (OUTPATIENT)
Dept: ADMINISTRATIVE | Age: 42
End: 2022-04-28

## 2022-04-28 VITALS — BODY MASS INDEX: 43 KG/M2 | TEMPERATURE: 98 F | WEIGHT: 242.7 LBS | HEIGHT: 63 IN

## 2022-04-28 DIAGNOSIS — M17.12 PRIMARY OSTEOARTHRITIS OF LEFT KNEE: Primary | ICD-10-CM

## 2022-04-28 PROCEDURE — 1036F TOBACCO NON-USER: CPT | Performed by: ORTHOPAEDIC SURGERY

## 2022-04-28 PROCEDURE — G8427 DOCREV CUR MEDS BY ELIG CLIN: HCPCS | Performed by: ORTHOPAEDIC SURGERY

## 2022-04-28 PROCEDURE — G8417 CALC BMI ABV UP PARAM F/U: HCPCS | Performed by: ORTHOPAEDIC SURGERY

## 2022-04-28 NOTE — TELEPHONE ENCOUNTER
Pt called, said office left her a message to call them back about her Botox, and was unable to reach office staff. Please call pt back. Thanks!

## 2022-04-28 NOTE — PROGRESS NOTES
Chief Complaint   Patient presents with    Knee Pain     left knee arthroscopy f/u wants to discuss surgery for the right knee. Pola Sams returns today for follow-up of her left knee pain. she reports this is worse than when I saw her last.   She continues to have severe pain in her left knee and has difficulty performing ADL's. She has undergone the follow treatments: arthroscopy x 2, visco injections, cortisone injections, physical therapy, HEP, NSAIDS, pain medication, weight reduction and activity restriction. Past Medical History:   Diagnosis Date    Anxiety and depression     Arthritis     Asthma     Bulging lumbar disc     Cervical disc disease     Diabetes mellitus (HCC)     diet controlled    Ectopic pregnancy     Fasciitis     Right    Fibromyalgia     GERD (gastroesophageal reflux disease)     Heart murmur     benign    Herpes genitalia     Hyperlipidemia     Hypertension     IBS (irritable bowel syndrome)     Migraines     Ovarian cyst     PONV (postoperative nausea and vomiting)     with anesthesia    Stage 3 chronic kidney disease (HCC)     follows w Dr Christofer Cisse Thyroid disease      Review of Systems:     Skin: (-) rash,(-) psoriasis,(-) eczema, (-)skin cancer. Musculoskeletal: (-) fractures,  (-) dislocations,(-) collagen vascular disease, (-) fibromyalgia, (-) multiple sclerosis, (-) muscular dystrophy, (-) RSD,(-) joint pain (-)swelling, (-) joint pain,swelling. Neurologic: (-) epilepsy, (-)seizures,(-) brain tumor,(-) TIA, (-)stroke, (-)headaches, (-)Parkinson disease,(-) memory loss, (-) LOC. Cardiovascular: (-) Chest pain, (-) swelling in legs/feet, (-) SOB, (-) cramping in legs/feet with walking. Constitutional:    The patient is alert and oriented x 3, appears to be stated age and in no distress. Temp 98 °F (36.7 °C)   Ht 5' 3\" (1.6 m)   Wt 242 lb 11.2 oz (110.1 kg)   BMI 42.99 kg/m²     Skin:  Upon inspection: the skin appears warm, dry and intact. There is not a previous scar over the affected area. There is not any cellulitis, lymphedema or cutaneous lesions noted in the lower extremities. Upon palpation there is no induration noted. Neurologic:  Gait: normal;  Motor exam of the lower extremities show ; quadriceps, hamstrings, foot dorsi and plantar flexors intact R.  5/5 and L. 5/5. Deep tendon reflexes are 2/4 at the knees and 2/4 at the ankles with strong extensor hallicus longus motor strength bilaterally. Sensory to both feet is intact to all sensory roots. Cardiovascular: The vascular exam is normal and is well perfused to distal extremities. Distal pulses DP/PT: R. 2+; L. 2+. There is cap refill noted less than two seconds in all digits. There is not edema of the bilateral lower extremities. There is not varicosities noted in the distal extremities. Lymph:  Upon palpation,  there is no lymphadenopathy noted in bilateral lower extremities. Musculoskeletal:  Gait: antalgic; examination of the nails and digits reveal no cyanosis or clubbing    Lumbar exam:  On visual inspection, there is no deformity of the spine. full range of motion, no tenderness, palpable spasm or pain on motion. Special tests: Straight Leg Raise negative, Omer testnegative. Hip exam:  Upon inspection, there is no deformity noted. Upon palpation there is not tenderness. ROM: is   full and semetrical.   Strength: Hip Flexors 5/5; Hip Abductors 5/5; Hip Adduction 5/5. Knee exam:  Left knee exam shows;  range of motion of R. Knee is 0 to 120, and L. Knee is 0 to 120. She does have  pain on motion, effusion is mild, there is tenderness over the  medial, lateral, anterior region, there are not any masses, there is not ligamentous instability, there is not  deformity noted. Knee exam: left positive for moderate crepitations, some mild tenderness laxity is not noted wit stress.       R. Knee:  Lachman's negative, Anterior Drawer negative, Posterior Drawer negative  Miranda's negative, Thallasy  negative,   PF grind test negative, Apprehension test negative, Patellar J sign  negative  L. Knee:  Lachman's negative, Anterior Drawer negative, Posterior Drawer negative  Miranda's positive, Thallasy  positive,   PF grind test positive, Apprehension test negative,  Patellar J sign  negative    Xrays:   Impression   1. There is no fracture or dislocation of the left knee. MRI:   NO EVIDENCE OF MENISCAL TEAR OR ACUTE LIGAMENTOUS INJURY. MILD TRICOMPARTMENTAL CHONDRAL DEGENERATIVE CHANGES, GREATEST INVOLVING   THE LATERAL TIBIAL PLATEAU. .      Radiographic findings reviewed with patient    Impression:  Encounter Diagnosis   Name Primary?  Primary osteoarthritis of left knee Yes       Plan:   Natural history and expected course discussed. Questions answered. Educational materials distributed. Rest, ice, compression, and elevation (RICE) therapy. Reduction in offending activity. The patient has failed all attempts at conservative treatment and would like to plan for a knee replacement surgery. I explained that I am concerned about her weight and see understands. Her BMI is higher than I'd like. I will see her back in August with new films to discuss surgery.

## 2022-05-17 ENCOUNTER — TELEPHONE (OUTPATIENT)
Dept: NEUROLOGY | Age: 42
End: 2022-05-17

## 2022-05-17 NOTE — TELEPHONE ENCOUNTER
Patient called in and left message that she received letter her Botox was denied. She said it was ordered through the wrong place? Was able to only get part of the message. Tried to call the patient and left message for her to call the office.

## 2022-05-21 ENCOUNTER — HOSPITAL ENCOUNTER (EMERGENCY)
Age: 42
Discharge: HOME | End: 2022-05-21
Attending: STUDENT IN AN ORGANIZED HEALTH CARE EDUCATION/TRAINING PROGRAM
Payer: COMMERCIAL

## 2022-05-21 VITALS
SYSTOLIC BLOOD PRESSURE: 138 MMHG | HEART RATE: 88 BPM | TEMPERATURE: 97.3 F | WEIGHT: 236 LBS | HEIGHT: 63 IN | OXYGEN SATURATION: 99 % | BODY MASS INDEX: 41.82 KG/M2 | RESPIRATION RATE: 16 BRPM | DIASTOLIC BLOOD PRESSURE: 99 MMHG

## 2022-05-21 DIAGNOSIS — J01.90 ACUTE SINUSITIS, RECURRENCE NOT SPECIFIED, UNSPECIFIED LOCATION: Primary | ICD-10-CM

## 2022-05-21 DIAGNOSIS — J40 BRONCHITIS: ICD-10-CM

## 2022-05-21 PROCEDURE — 99283 EMERGENCY DEPT VISIT LOW MDM: CPT

## 2022-05-21 RX ORDER — AMOXICILLIN AND CLAVULANATE POTASSIUM 875; 125 MG/1; MG/1
1 TABLET, FILM COATED ORAL 2 TIMES DAILY
Qty: 20 TABLET | Refills: 0 | Status: SHIPPED | OUTPATIENT
Start: 2022-05-21 | End: 2022-05-31

## 2022-05-21 RX ORDER — OXYMETAZOLINE HYDROCHLORIDE 0.05 G/100ML
2 SPRAY NASAL 2 TIMES DAILY
Qty: 30 ML | Refills: 0 | Status: SHIPPED | OUTPATIENT
Start: 2022-05-21 | End: 2022-05-24

## 2022-05-21 ASSESSMENT — ENCOUNTER SYMPTOMS
BACK PAIN: 0
COLOR CHANGE: 0
SHORTNESS OF BREATH: 0
DIARRHEA: 0
EYE PAIN: 0
NAUSEA: 0
EYE REDNESS: 0
VOMITING: 0
EYE ITCHING: 0
ABDOMINAL PAIN: 0
SINUS PAIN: 1
COUGH: 1
SINUS PRESSURE: 1

## 2022-05-21 ASSESSMENT — PAIN DESCRIPTION - PAIN TYPE: TYPE: ACUTE PAIN

## 2022-05-21 ASSESSMENT — PAIN SCALES - GENERAL: PAINLEVEL_OUTOF10: 5

## 2022-05-21 ASSESSMENT — PAIN DESCRIPTION - FREQUENCY: FREQUENCY: CONTINUOUS

## 2022-05-21 ASSESSMENT — PAIN - FUNCTIONAL ASSESSMENT: PAIN_FUNCTIONAL_ASSESSMENT: 0-10

## 2022-05-21 ASSESSMENT — PAIN DESCRIPTION - DESCRIPTORS: DESCRIPTORS: ACHING

## 2022-05-21 ASSESSMENT — PAIN DESCRIPTION - LOCATION: LOCATION: HEAD

## 2022-05-21 NOTE — ED PROVIDER NOTES
HPI   This a 26-year-old female patient presented to emergency department for evaluation of cough sinus congestion for the last 2 to 3 days. She has past medical history of lupus, iron deficiency anemia and diabetes. Patient reporting that symptoms have been worsening. She denies any fevers, chills, nausea, vomiting, diarrhea. States she been eating well. States that she has an associated harsh cough and she is bringing up yellowish phlegm. She is not having any chest pain or shortness of breath. She has tried over-the-counter medications without any relief of symptoms. Review of Systems   Constitutional: Negative for chills and fever. HENT: Positive for congestion, sinus pressure and sinus pain. Eyes: Negative for pain, redness and itching. Respiratory: Positive for cough. Negative for shortness of breath. Cardiovascular: Negative for chest pain. Gastrointestinal: Negative for abdominal pain, diarrhea, nausea and vomiting. Genitourinary: Negative for difficulty urinating, dysuria and hematuria. Musculoskeletal: Negative for back pain. Skin: Negative for color change. All other systems reviewed and are negative. Physical Exam  Vitals and nursing note reviewed. Constitutional:       Appearance: Normal appearance. HENT:      Head: Normocephalic and atraumatic. Comments: Maxillary and frontal sinus tenderness to palpation     Right Ear: Tympanic membrane normal.      Left Ear: Tympanic membrane normal.      Nose: Nose normal. No congestion. Mouth/Throat:      Mouth: Mucous membranes are moist.      Pharynx: Oropharynx is clear. No oropharyngeal exudate or posterior oropharyngeal erythema. Eyes:      Conjunctiva/sclera: Conjunctivae normal.      Pupils: Pupils are equal, round, and reactive to light. Cardiovascular:      Rate and Rhythm: Normal rate and regular rhythm. Pulses: Normal pulses. Heart sounds: Normal heart sounds.    Pulmonary:      Effort: Pulmonary effort is normal. No respiratory distress. Breath sounds: Normal breath sounds. Comments: No wheezes rales or rhonchi. Abdominal:      General: Bowel sounds are normal. There is no distension. Tenderness: There is no abdominal tenderness. Musculoskeletal:         General: Normal range of motion. Cervical back: Normal range of motion and neck supple. Right lower leg: No edema. Left lower leg: No edema. Skin:     General: Skin is warm and dry. Capillary Refill: Capillary refill takes less than 2 seconds. Neurological:      General: No focal deficit present. Mental Status: She is alert. Procedures     MDM   This a 79-year-old female patient who presented to emergency department for evaluation of sinus congestion, sinus pressure and cough. She is well-appearing in no acute distress. Patient with multiple comorbidities. Patient is afebrile with stable vital signs and clear lung sounds however at this time with patient's history and risk factors I decided to give her Augmentin as well as Afrin nasal spray. She is to follow-up with her primary care physician.               --------------------------------------------- PAST HISTORY ---------------------------------------------  Past Medical History:  has a past medical history of Anxiety and depression, Arthritis, Asthma, Bulging lumbar disc, Cervical disc disease, Diabetes mellitus ([address] Ectopic pregnancy, Fasciitis, Fibromyalgia, GERD (gastroesophageal reflux disease), Heart murmur, Herpes genitalia, Hyperlipidemia, Hypertension, IBS (irritable bowel syndrome), Migraines, Ovarian cyst, PONV (postoperative nausea and vomiting), Stage 3 chronic kidney disease ([address] and Thyroid disease. Past Surgical History:  has a past surgical history that includes Dilation and curettage of uterus; Tubal ligation;  Tonsillectomy; ECHO Compl W Dop Color Flow (08/24/2013); other surgical history; cervical fusion (07/30/2014); Knee arthroscopy (Right, 2014); Knee arthroscopy (Right, 04/01/2016); Nerve Block (Bilateral, 12/06/2016); Knee arthroscopy (Left, 12/16/2016); pr shoulder scope bone shaving (Left, 04/12/2018); Cervical spine surgery; Shoulder arthroscopy (Right, 01/03/2020); Knee arthroscopy (Right, 07/10/2020); Colonoscopy; Endoscopy, colon, diagnostic; Leg biopsy excision (Right, 5/26/2021); and Knee arthroscopy (Left, 3/11/2022). Social History:  reports that she has never smoked. She has never used smokeless tobacco. She reports that she does not drink alcohol and does not use drugs. Family History: family history includes Arthritis in an other family member; Bipolar Disorder in her mother and sister; Depression in her sister; Diabetes in her mother; Hypertension in her mother and another family member; Mental Illness in an other family member; Migraines in her mother; No Known Problems in her father; Ovarian Cancer in her sister. The patients home medications have been reviewed. Allergies: Aspirin, Mobic [meloxicam], Other, Cortisone, Darvocet a500 [propoxyphene n-acetaminophen], Folic acid, Gabapentin, Propoxyphene, and Tape [adhesive tape]    -------------------------------------------------- RESULTS -------------------------------------------------  Labs:  No results found for this visit on 05/21/22. Radiology:  No orders to display       ------------------------- NURSING NOTES AND VITALS REVIEWED ---------------------------  Date / Time Roomed:  5/21/2022  2:23 PM  ED Bed Assignment:  02/02    The nursing notes within the ED encounter and vital signs as below have been reviewed.    BP (!) 138/99   Pulse 88   Temp 97.3 °F (36.3 °C) (Temporal)   Resp 16   Ht 5' 3\" (1.6 m)   Wt 236 lb (107 kg)   LMP 05/01/2022   SpO2 99%   BMI 41.81 kg/m²   Oxygen Saturation Interpretation: Normal      --------------------------------- ADDITIONAL PROVIDER NOTES ---------------------------------  At this time the patient is without objective evidence of an acute process requiring hospitalization or inpatient management. They have remained hemodynamically stable throughout their entire ED visit and are stable for discharge with outpatient follow-up. The plan has been discussed in detail and they are aware of the specific conditions for emergent return, as well as the importance of follow-up. Discharge Medication List as of 5/21/2022  3:29 PM      START taking these medications    Details   amoxicillin-clavulanate (AUGMENTIN) 875-125 MG per tablet Take 1 tablet by mouth 2 times daily for 10 days, Disp-20 tablet, R-0Print      oxymetazoline (12 HOUR NASAL SPRAY) 0.05 % nasal spray 2 sprays by Nasal route 2 times daily for 3 days, Disp-30 mL, R-0Print             Diagnosis:  1. Acute sinusitis, recurrence not specified, unspecified location    2. Bronchitis        Disposition:  Patient's disposition: Discharge to home  Patient's condition is stable.        Nara Hidalgo DO  Resident  05/21/22 [id number]

## 2022-06-03 ENCOUNTER — HOSPITAL ENCOUNTER (OUTPATIENT)
Dept: INFUSION THERAPY | Age: 42
Discharge: HOME | End: 2022-06-03
Payer: COMMERCIAL

## 2022-06-03 DIAGNOSIS — D64.9 ANEMIA, UNSPECIFIED TYPE: Primary | ICD-10-CM

## 2022-06-03 LAB
BASOPHILS ABSOLUTE: 0.03 E9/L (ref 0–0.2)
BASOPHILS RELATIVE PERCENT: 0.5 % (ref 0–2)
EOSINOPHILS ABSOLUTE: 0.1 E9/L (ref 0.05–0.5)
EOSINOPHILS RELATIVE PERCENT: 1.7 % (ref 0–6)
FERRITIN: 168 NG/ML
HCT VFR BLD CALC: 45.5 % (ref 34–48)
HEMOGLOBIN: 14.7 G/DL (ref 11.5–15.5)
IMMATURE GRANULOCYTES #: 0.02 E9/L
IMMATURE GRANULOCYTES %: 0.3 % (ref 0–5)
IRON SATURATION: 49 % (ref 15–50)
IRON: 118 MCG/DL (ref 37–145)
LYMPHOCYTES ABSOLUTE: 1.23 E9/L (ref 1.5–4)
LYMPHOCYTES RELATIVE PERCENT: 20.7 % (ref 20–42)
MCH RBC QN AUTO: 31.8 PG (ref 26–35)
MCHC RBC AUTO-ENTMCNC: 32.3 % (ref 32–34.5)
MCV RBC AUTO: 98.5 FL (ref 80–99.9)
MONOCYTES ABSOLUTE: 0.37 E9/L (ref 0.1–0.95)
MONOCYTES RELATIVE PERCENT: 6.2 % (ref 2–12)
NEUTROPHILS ABSOLUTE: 4.18 E9/L (ref 1.8–7.3)
NEUTROPHILS RELATIVE PERCENT: 70.6 % (ref 43–80)
PDW BLD-RTO: 12 FL (ref 11.5–15)
PLATELET # BLD: 258 E9/L (ref 130–450)
PMV BLD AUTO: 11.9 FL (ref 7–12)
RBC # BLD: 4.62 E12/L (ref 3.5–5.5)
TOTAL IRON BINDING CAPACITY: 243 MCG/DL (ref 250–450)
WBC # BLD: 5.9 E9/L (ref 4.5–11.5)

## 2022-06-03 PROCEDURE — 82728 ASSAY OF FERRITIN: CPT

## 2022-06-03 PROCEDURE — 85025 COMPLETE CBC W/AUTO DIFF WBC: CPT

## 2022-06-03 PROCEDURE — 36415 COLL VENOUS BLD VENIPUNCTURE: CPT

## 2022-06-03 PROCEDURE — 83550 IRON BINDING TEST: CPT

## 2022-06-03 PROCEDURE — 83540 ASSAY OF IRON: CPT

## 2022-06-06 ENCOUNTER — OFFICE VISIT (OUTPATIENT)
Dept: ONCOLOGY | Age: 42
End: 2022-06-06
Payer: COMMERCIAL

## 2022-06-06 ENCOUNTER — TELEPHONE (OUTPATIENT)
Dept: ADMINISTRATIVE | Age: 42
End: 2022-06-06

## 2022-06-06 VITALS
HEART RATE: 81 BPM | TEMPERATURE: 99.1 F | BODY MASS INDEX: 41.99 KG/M2 | SYSTOLIC BLOOD PRESSURE: 132 MMHG | OXYGEN SATURATION: 98 % | WEIGHT: 237 LBS | HEIGHT: 63 IN | DIASTOLIC BLOOD PRESSURE: 95 MMHG

## 2022-06-06 DIAGNOSIS — D64.9 ANEMIA, UNSPECIFIED TYPE: Primary | ICD-10-CM

## 2022-06-06 PROCEDURE — 1036F TOBACCO NON-USER: CPT | Performed by: INTERNAL MEDICINE

## 2022-06-06 PROCEDURE — G8417 CALC BMI ABV UP PARAM F/U: HCPCS | Performed by: INTERNAL MEDICINE

## 2022-06-06 PROCEDURE — 99213 OFFICE O/P EST LOW 20 MIN: CPT | Performed by: INTERNAL MEDICINE

## 2022-06-06 PROCEDURE — G8427 DOCREV CUR MEDS BY ELIG CLIN: HCPCS | Performed by: INTERNAL MEDICINE

## 2022-06-06 PROCEDURE — 99212 OFFICE O/P EST SF 10 MIN: CPT

## 2022-06-06 NOTE — PROGRESS NOTES
[address] [facility] ONCOLOGY  [facility] [address]  Dept: [phone]  Loc: [phone]  Attending progress note      Reason for Visit: Iron deficiency. Referring Physician:  NADIR Jamison CNP    PCP:  NADIR Jamison CNP    History of Present Illness:      Mrs. Porsche Dinh is a pleasant 51-year-old lady, with a past medical history significant for asthma, anxiety and depression, DM, GERD, CKD, hyperlipidemia, hypertension, and fibromyalgia, who was referred to the hematology office for evaluation of iron deficiency that was not responsive to the oral iron. The patient's hemoglobin hematocrit are normal, but she has persistent iron deficiency, she had blood work done with Dr. Arian Pastrana, was told she needed parenteral iron infusion. The patient is feeling tired, she has heavy menstrual bleeding, no melena or hematochezia. She had GI side effects with the oral iron. The patient received the first dose of Feraheme on 1/6/2022, she had nausea for 2 days after receiving the infusion. She received Zofran with the second dose. Patient is doing well at this time. Dr. Arian Pastrana retiring, she will follow-up with a different provider. Review of Systems;  CONSTITUTIONAL: No fever, chills. Good appetite, improved energy level. ENMT: Eyes: No diplopia; Nose: No epistaxis. Mouth: No sore throat. RESPIRATORY: No hemoptysis, shortness of breath, cough. CARDIOVASCULAR: No chest pain, palpitations. GASTROINTESTINAL: Positive for nausea, no abdominal pain, diarrhea/constipation. GENITOURINARY: No dysuria, urinary frequency, hematuria. NEURO: No syncope, presyncope, pos for headache.    Remainder:  ROS NEGATIVE    Past Medical History:      Diagnosis Date    Anxiety and depression     Arthritis     Asthma     Bulging lumbar disc     Cervical disc disease     Diabetes mellitus ([address]     diet controlled    Ectopic pregnancy 12/06/2016    lumbar facet #1    OTHER SURGICAL HISTORY      partial rt  fallopian tube removed    [place] SHOULDER SCOPE BONE SHAVING Left 04/12/2018    LEFT SHOULDER ARTHROSCOPY, SUBACROMIAL DECOMPRESSION AND DEBRIDEMENT performed by Vangie Kendrick DO at [address] ARTHROSCOPY Right 01/03/2020    RIGHT SHOULDER ARTHROSCOPY, SUBACROMIAL DECOMPRESSION AND DEBRIDEMENT LABRIUM performed by Vangie Kendrick DO at [address]         Family History:  Family History   Problem Relation Age of Onset    Diabetes Mother    Araceli Solum Migraines Mother     Hypertension Mother     Bipolar Disorder Mother     Ovarian Cancer Sister     Bipolar Disorder Sister     Depression Sister     Mental Illness Other     Arthritis Other     Hypertension Other     No Known Problems Father        Medications:  Reviewed and reconciled. Social History:  Social History     Socioeconomic History    Marital status:      Spouse name: Not on file    Number of children: Not on file    Years of education: Not on file    Highest education level: Not on file   Occupational History    Not on file   Tobacco Use    Smoking status: Never Smoker    Smokeless tobacco: Never Used   Vaping Use    Vaping Use: Never used   Substance and Sexual Activity    Alcohol use: No    Drug use: No    Sexual activity: Not on file   Other Topics Concern    Not on file   Social History Narrative    Not on file     Social Determinants of Health     Financial Resource Strain:     Difficulty of Paying Living Expenses: Not on file   Food Insecurity:     Worried About [employer] in the Last Year: Not on file    [employer] in the Last Year: Not on file   Transportation Needs:     Lack of Transportation (Medical): Not on file    Lack of Transportation (Non-Medical):  Not on file   Physical Activity:     Days of Exercise per Week: Not on file    Minutes of Exercise per Session: Not Positive bowel sounds. EXTREMITIES: Without clubbing, cyanosis, or edema. NEUROLOGIC: No focal deficits. ECOG PS 0      Impression/Plan:        Mrs. Rafael Ricks is a pleasant 40-year-old lady, with a past medical history significant for asthma, anxiety and depression, DM, GERD, CKD, hyperlipidemia, hypertension, and fibromyalgia, who was referred to the hematology office for evaluation of iron deficiency that was not responsive to the oral iron. The patient's hemoglobin hematocrit are normal, but she has persistent iron deficiency, she had blood work done with Dr. Gary Jerez, was told she needed parenteral iron infusion. The patient is feeling tired, she has heavy menstrual bleeding, no melena or hematochezia. She had GI side effects with the oral iron. The patient has iron deficiency without anemia, likely secondary to heavy menstrual bleeding, she has not been responding to the oral iron, also had GI side effects from it, recommended parenteral iron infusion as she was symptomatic, discussed with the patient Feraheme x2 doses 1 week apart, the side effects were reviewed with the patient, The patient received the first dose of Feraheme on 1/6/2022, she had nausea, was medicated with Zofran with the second dose FIT test is negative, GI w/up in 2019 was neg for bleeding, she has mild leukopenia with lymphocytopenia, likely reactive, had resolved. The patient is doing well clinically at this time, labs reviewed, she is not anemic, findings consistent with anemia of chronic inflammation, no iron deficiency. No indication for parenteral iron infusion at this time. We will monitor her CBCD and iron studies, she will receive parenteral iron infusion as indicated. RTC in 3 months    Thank you for allowing us to participate in the care of Mrs. Rafael Ricks.     Jay Oquendo MD   HEMATOLOGY/MEDICAL [address] ONCOLOGY  [address] [place] [address] Willie Barrera  Dept: [place]: [phone]

## 2022-06-14 ENCOUNTER — TELEPHONE (OUTPATIENT)
Dept: ADMINISTRATIVE | Age: 42
End: 2022-06-14

## 2022-06-14 NOTE — TELEPHONE ENCOUNTER
Patient was calling to schedule her botox with Sandra Varghese. She would like a call back. Please call patient. THank you!

## 2022-06-19 NOTE — PROGRESS NOTES
Botulinum Toxin Injection Procedure Note    Pre-procedure Diagnosis: Chronic migraine    Indications: same    Procedure Details   The risks, benefits, indications, potential complications, and alternatives were explained to the patient and informed consent obtained. The following 31 injection sites were injected with botulinum toxin:    Bilateral Corrugators: 5 units each side  Procerus: 5 units  Bilateral frontalis: 10 units each side  Bilateral temporalis: 20 units each side  Bilateral occipitalis: 15 units each side  Bilateral cervical paraspinals: 10 units each side  Bilateral trapezius: 15 units each side    Total: 155 units      Botulinum toxin Lot #:[id number]  Botulinum toxin expiration date: 10/2024  Total botox units injected: 155 units  Total botox units wasted: 45 units    The patient tolerated the procedure well. Plan:    Call with any facial muscle weakness, ptosis, neck weakness, difficulty swallowing, difficulty speaking, or diffculty breathing    Keep a careful HA diary    Return to clinic for repeat botulinum injection in 3 months.     NADIR Connor CNP, [facility]  11:21 AM

## 2022-06-20 ENCOUNTER — OFFICE VISIT (OUTPATIENT)
Dept: NEUROLOGY | Age: 42
End: 2022-06-20
Payer: COMMERCIAL

## 2022-06-20 VITALS
OXYGEN SATURATION: 99 % | HEART RATE: 76 BPM | DIASTOLIC BLOOD PRESSURE: 86 MMHG | TEMPERATURE: 98 F | SYSTOLIC BLOOD PRESSURE: 128 MMHG

## 2022-06-20 DIAGNOSIS — G43.709 CHRONIC MIGRAINE WITHOUT AURA WITHOUT STATUS MIGRAINOSUS, NOT INTRACTABLE: Primary | ICD-10-CM

## 2022-06-20 PROCEDURE — 96372 THER/PROPH/DIAG INJ SC/IM: CPT | Performed by: NURSE PRACTITIONER

## 2022-06-20 PROCEDURE — 99999 PR OFFICE/OUTPT VISIT,PROCEDURE ONLY: CPT | Performed by: NURSE PRACTITIONER

## 2022-06-20 RX ORDER — UBROGEPANT 100 MG/1
100 TABLET ORAL DAILY PRN
Qty: 48 TABLET | Refills: 3 | Status: SHIPPED
Start: 2022-06-20 | End: 2022-09-28 | Stop reason: SDUPTHER

## 2022-06-20 NOTE — PATIENT INSTRUCTIONS
Patient Education        onabotulinumtoxinA (Botox)  Pronunciation: ON a BOT ue CHELLY num TOX in A  Brand: Botox, Botox Cosmetic  What is the most important information I should know about Botox? The botulinum toxin contained in this medicine can spread to other body areas beyond where it was injected. This can cause serious life-threatening side effects. Call your doctor at once if you have a hoarse voice, drooping eyelids, vision problems, severe eye irritation, severe muscle weakness, loss of bladdercontrol, or trouble breathing, talking, or swallowing. What is onabotulinumtoxinA (Botox)? Botox is used in adults to treat cervical dystonia (abnormal head position andneck pain). Botox is also used to treat muscle spasms and stiffness (spasticity) of the arms, hands, legs, and feet in adults and children at least 3years old. This medicine will not treat spasticity caused by cerebral palsy. Botox is also used to treat certain eye muscle conditions caused by nerve disorders in adults and children who are at least 15years old. This includes uncontrolled blinking or spasm of the eyelids, and a condition in which theeyes do not point in the same direction. Botox is also used in adults to treat overactive bladder and incontinence (urine leakage) that has not been helped by other medication. Botox may be used to treat incontinence caused by nerve disorders such as spinal cord injury ormultiple sclerosis. Botox is also used to treat overactive bladder caused by a neurologic disorder (such as multiple sclerosis or spinal cord injury) in children at least 4 yearsold when other medicines cannot be used or have not worked. Botox is also used in adults to prevent chronic migraine headaches that occur more than 15 days per month and last 4 hours or longer. Botox should not beused to treat a common tension headache. Botox is also used to treat severe underarm sweating (hyperhidrosis) in adults.   Botox Cosmetic is used to temporarily lessen the appearance of facial wrinkles in adults. Botox may also be used for purposes not listed in this medication guide. What should I discuss with my healthcare provider before I receive Botox? You should not be treated with Botox if you are allergic to botulinum toxin, orif you have:   an infection in the area where the medicine will be injected; or   (for overactive bladder and incontinence) if you have a current bladder infection or if you are unable to urinate and you do not routinely use a catheter. Tell your doctor if you have ever had:   other botulinum toxin injections such as Dysport or Myobloc (especially in the last 4 months);   amyotrophic lateral sclerosis (ALS, or \"Terri Gehrig's disease\");   myasthenia gravis;   Lambert-Eaton syndrome;   a side effect after prior use of botulinum toxin;   a breathing disorder such as asthma or emphysema;   problems with swallowing;   facial muscle weakness (droopy eyelids, weak forehead, trouble raising your eyebrows);   a change in the normal appearance of your face;   bleeding problems; or   surgery (especially on your face). Botox is made from donated human plasma and may contain viruses or other infectious agents. Donated plasma is tested and treated to reduce the risk of contamination, but there is still a small possibility it could transmitdisease. Ask your doctor about any possible risk. Tell your doctor if you are pregnant or breastfeeding. How is Botox given? Botox injections should be given only by a trained medical professional, even when used for cosmetic purposes. This medicine is injected into a muscle by a healthcare provider. Botox injections should be spaced at least 3 months apart. Botox injections may be given into more than one area at a time, depending onthe condition being treated.   While receiving injections for eye muscle conditions, you may need to use eye drops, ointment, a special contact lens or otherdevice to protect the surface of your eye. Follow your doctor's instructions. If you are being treated for excessive sweating, shave your underarms about 24 hours before your injection. Do not apply antiperspirant or deodorant for 24 hours before or after you receive the injection. Avoid exercise and hot foods or beverages within 30 minutes beforethe injection. It may take up to 2 weeks after injection before neck muscle spasm  symptoms begin to improve. You may notice the greatest improvement after 6weeks. It may take only 1 to 3 days after injection before eye muscle spasm  symptoms begin to improve. You may notice the greatest improvement after 2 to6 weeks. The effects of a Botox injection are temporary. Your symptoms may return completely within 3 months. After repeat injections, it may take less and less time before your symptoms return, especially if yourbody develops antibodies to the botulinum toxin. Do not seek botulinum toxin injections from more than one medical professional at a time. If you switch healthcare providers, tell your new provider how longit has been since your last botulinum toxin injection. Using this medication more often than prescribed will not make it more effective and may result in serious side effects. What happens if I miss a dose? Since botulinum toxin has a temporary effect and is given at widely spacedintervals, missing a dose is not likely to be harmful. What happens if I overdose? Seek emergency medical attention or call the Poison Help line at [phone]. Overdose symptoms may not appear right away, but can include muscle weakness,trouble swallowing, and weak or shallow breathing. What should I avoid after receiving Botox? Botox may impair your vision or depth perception. Avoid driving or hazardousactivity until you know how this medicine will affect you. Avoid going back to your normal physical activities too quickly after receivingan injection.   What are the possible side effects of Botox? Get emergency medical help if you have signs of an allergic reaction:  hives, itching; wheezing, difficult breathing; feeling like you might passout; swelling of your face, lips, tongue, or throat. The botulinum toxin contained in Botox can spread to other body areas beyond where it was injected. This has caused serious life-threatening side effects in some people receivingbotulinum toxin injections, even for cosmetic purposes. Call your doctor at once if you have any of these side effects (up to severalhours or several weeks after an injection):   unusual or severe muscle weakness (especially in a body area that was not injected with the medication);   loss of bladder control;   hoarse voice, trouble talking or swallowing;   drooping eyelids or eyebrows;   vision changes, eye pain, severely dry or irritated eyes (your eyes may also be more sensitive to light);   chest pain or pressure, pain spreading to your jaw or shoulder, irregular heartbeats;   pain or burning when you urinate, trouble emptying your bladder;   sore throat, cough, chest tightness, shortness of breath; or   eyelid swelling, crusting or drainage from your eyes, problems with vision. Common side effects may include:   painful or difficult urination;   headache, neck pain, back pain, pain in your arms or legs;   cold symptoms such as stuffy nose, sneezing, sore throat;   trouble swallowing;   fever, chills, body aches, flu symptoms;   increased sweating in areas other than the underarms; or   bruising, bleeding, pain, redness, or swelling where the injection was given. This is not a complete list of side effects and others may occur. Call your doctor for medical advice about side effects. You may report side effects toFDA at [phone]. What other drugs will affect Botox?   Tell your doctor about all your other medicines, especially:   a muscle relaxer;   cold or allergy medicine;   sleep medicine;   an injectable antibiotic; or   a blood thinner --warfarin, Coumadin, Jantoven. This list is not complete. Other drugs may affect Botox, including prescription and over-the-counter medicines, vitamins, and herbal products. Not all possibledrug interactions are listed here. Where can I get more information? Your doctor or pharmacist can provide more information about Botox(onabotulinumtoxinA). Remember, keep this and all other medicines out of the reach of children, never share your medicines with others, and use this medication only for the indication prescribed. Every effort has been made to ensure that the information provided by [address] [place], [place] [address] is accurate, up-to-date, and complete, but no guarantee is made to that effect. Drug information contained herein may be time sensitive. [facility] information has been compiled for use by healthcare practitioners and consumers in the [place] and therefore [facility] does not warrant that uses outside of the [place] are appropriate, unless specifically indicated otherwise. [facility]'s drug information does not endorse drugs, diagnose patients or recommend therapy. [facility] drug information is an informational resource designed to assist licensed healthcare practitioners in caring for their patients and/or to serve consumers viewing this service as a supplement to, and not a substitute for, the expertise, skill, knowledge and judgment of healthcare practitioners. The absence of a warning for a given drug or drug combination in no way should be construed to indicate that the drug or drug combination is safe, effective or appropriate for any given patient. [facility] does not assume any responsibility for any aspect of healthcare administered with the aid of information [facility] provides.  The information contained herein is not intended to cover all possible uses, directions, precautions, warnings, drug interactions, allergic reactions, or adverse effects. If you have questions about the drugs you are taking, check with yourdoctor, nurse or pharmacist.  Copyright 5239-9027 [address]. Version: 9.01. Revision date: 2/11/2021. Care instructions adapted under license by [facility] ([facility]). If you have questions about a medical condition or this instruction, always ask your healthcare professional. [place] [address] any warranty or liability for your use of this information.

## 2022-06-23 ENCOUNTER — TELEPHONE (OUTPATIENT)
Dept: NEUROLOGY | Age: 42
End: 2022-06-23

## 2022-09-28 ENCOUNTER — OFFICE VISIT (OUTPATIENT)
Dept: NEUROLOGY | Age: 42
End: 2022-09-28
Payer: COMMERCIAL

## 2022-09-28 VITALS
TEMPERATURE: 97.6 F | DIASTOLIC BLOOD PRESSURE: 78 MMHG | OXYGEN SATURATION: 100 % | HEART RATE: 72 BPM | BODY MASS INDEX: 41.63 KG/M2 | WEIGHT: 235 LBS | SYSTOLIC BLOOD PRESSURE: 128 MMHG

## 2022-09-28 DIAGNOSIS — G43.709 CHRONIC MIGRAINE WITHOUT AURA WITHOUT STATUS MIGRAINOSUS, NOT INTRACTABLE: Primary | ICD-10-CM

## 2022-09-28 PROCEDURE — 64615 CHEMODENERV MUSC MIGRAINE: CPT | Performed by: NURSE PRACTITIONER

## 2022-09-28 RX ORDER — UBROGEPANT 100 MG/1
100 TABLET ORAL DAILY PRN
Qty: 48 TABLET | Refills: 3 | Status: SHIPPED | OUTPATIENT
Start: 2022-09-28

## 2022-09-28 NOTE — PROGRESS NOTES
Botulinum Toxin Injection Procedure Note    Pre-procedure Diagnosis: Chronic migraine    Indications: same    Procedure Details   The risks, benefits, indications, potential complications, and alternatives were explained to the patient and informed consent obtained. The following 31 injection sites were injected with botulinum toxin:    Bilateral Corrugators: 5 units each side  Procerus: 5 units  Bilateral frontalis: 10 units each side  Bilateral temporalis: 20 units each side  Bilateral occipitalis: 15 units each side  Bilateral cervical paraspinals: 10 units each side  Bilateral trapezius: 15 units each side    Total: 155 units      Botulinum toxin Lot #: [id number]  Botulinum toxin expiration date: 1/2025  Total botox units injected: 155 units  Total botox units wasted: 45 units    The patient tolerated the procedure well. Plan:    Call with any facial muscle weakness, ptosis, neck weakness, difficulty swallowing, difficulty speaking, or diffculty breathing    Keep a careful HA diary    Return to clinic for repeat botulinum injection in 3 months.      Sharad Ya APRN - CNP -AQH  3:17 PM

## 2022-09-30 ENCOUNTER — HOSPITAL ENCOUNTER (OUTPATIENT)
Dept: INFUSION THERAPY | Age: 42
Discharge: HOME | End: 2022-09-30
Payer: COMMERCIAL

## 2022-09-30 DIAGNOSIS — M25.561 ACUTE PAIN OF RIGHT KNEE: Primary | ICD-10-CM

## 2022-09-30 DIAGNOSIS — D64.9 ANEMIA, UNSPECIFIED TYPE: Primary | ICD-10-CM

## 2022-09-30 LAB
BASOPHILS ABSOLUTE: 0.02 E9/L (ref 0–0.2)
BASOPHILS RELATIVE PERCENT: 0.4 % (ref 0–2)
EOSINOPHILS ABSOLUTE: 0.12 E9/L (ref 0.05–0.5)
EOSINOPHILS RELATIVE PERCENT: 2.7 % (ref 0–6)
FERRITIN: 130 NG/ML
HCT VFR BLD CALC: 45.4 % (ref 34–48)
HEMOGLOBIN: 14.5 G/DL (ref 11.5–15.5)
IMMATURE GRANULOCYTES #: 0.02 E9/L
IMMATURE GRANULOCYTES %: 0.4 % (ref 0–5)
IRON SATURATION: 37 % (ref 15–50)
IRON: 90 MCG/DL (ref 37–145)
LYMPHOCYTES ABSOLUTE: 0.97 E9/L (ref 1.5–4)
LYMPHOCYTES RELATIVE PERCENT: 21.8 % (ref 20–42)
MCH RBC QN AUTO: 31.9 PG (ref 26–35)
MCHC RBC AUTO-ENTMCNC: 31.9 % (ref 32–34.5)
MCV RBC AUTO: 100 FL (ref 80–99.9)
MONOCYTES ABSOLUTE: 0.35 E9/L (ref 0.1–0.95)
MONOCYTES RELATIVE PERCENT: 7.9 % (ref 2–12)
NEUTROPHILS ABSOLUTE: 2.97 E9/L (ref 1.8–7.3)
NEUTROPHILS RELATIVE PERCENT: 66.8 % (ref 43–80)
PDW BLD-RTO: 12.2 FL (ref 11.5–15)
PLATELET # BLD: 205 E9/L (ref 130–450)
PMV BLD AUTO: 12.1 FL (ref 7–12)
RBC # BLD: 4.54 E12/L (ref 3.5–5.5)
TOTAL IRON BINDING CAPACITY: 243 MCG/DL (ref 250–450)
WBC # BLD: 4.5 E9/L (ref 4.5–11.5)

## 2022-09-30 PROCEDURE — 82728 ASSAY OF FERRITIN: CPT

## 2022-09-30 PROCEDURE — 83540 ASSAY OF IRON: CPT

## 2022-09-30 PROCEDURE — 36415 COLL VENOUS BLD VENIPUNCTURE: CPT

## 2022-09-30 PROCEDURE — 83550 IRON BINDING TEST: CPT

## 2022-09-30 PROCEDURE — 85025 COMPLETE CBC W/AUTO DIFF WBC: CPT

## 2022-10-03 ENCOUNTER — OFFICE VISIT (OUTPATIENT)
Dept: ORTHOPEDIC SURGERY | Age: 42
End: 2022-10-03
Payer: COMMERCIAL

## 2022-10-03 ENCOUNTER — OFFICE VISIT (OUTPATIENT)
Dept: ONCOLOGY | Age: 42
End: 2022-10-03
Payer: COMMERCIAL

## 2022-10-03 VITALS — WEIGHT: 227 LBS | TEMPERATURE: 98 F | HEIGHT: 63 IN | BODY MASS INDEX: 40.22 KG/M2

## 2022-10-03 VITALS
BODY MASS INDEX: 40.29 KG/M2 | TEMPERATURE: 98.1 F | SYSTOLIC BLOOD PRESSURE: 136 MMHG | HEIGHT: 63 IN | OXYGEN SATURATION: 100 % | WEIGHT: 227.4 LBS | HEART RATE: 77 BPM | DIASTOLIC BLOOD PRESSURE: 101 MMHG

## 2022-10-03 DIAGNOSIS — S83.241A ACUTE MEDIAL MENISCUS TEAR, RIGHT, INITIAL ENCOUNTER: Primary | ICD-10-CM

## 2022-10-03 DIAGNOSIS — D64.9 ANEMIA, UNSPECIFIED TYPE: Primary | ICD-10-CM

## 2022-10-03 PROCEDURE — G8484 FLU IMMUNIZE NO ADMIN: HCPCS | Performed by: INTERNAL MEDICINE

## 2022-10-03 PROCEDURE — 99213 OFFICE O/P EST LOW 20 MIN: CPT | Performed by: INTERNAL MEDICINE

## 2022-10-03 PROCEDURE — G8417 CALC BMI ABV UP PARAM F/U: HCPCS | Performed by: INTERNAL MEDICINE

## 2022-10-03 PROCEDURE — 99212 OFFICE O/P EST SF 10 MIN: CPT

## 2022-10-03 PROCEDURE — 1036F TOBACCO NON-USER: CPT | Performed by: INTERNAL MEDICINE

## 2022-10-03 PROCEDURE — 99213 OFFICE O/P EST LOW 20 MIN: CPT | Performed by: ORTHOPAEDIC SURGERY

## 2022-10-03 PROCEDURE — G8427 DOCREV CUR MEDS BY ELIG CLIN: HCPCS | Performed by: INTERNAL MEDICINE

## 2022-10-03 NOTE — PROGRESS NOTES
Chief Complaint   Patient presents with    Knee Pain     Right Knee F/U, right knee is buckling recently       Joslyn Cason returns today for follow-up of her right knee pain. she reports this is worse than when I saw her last.  The patient's pain level is a 6+/10. The previous treatment was not successful. she cannot do steroids due to allergies, insurance denied visco supplementation.      Past Medical History:   Diagnosis Date    Anxiety and depression     Arthritis     Asthma     Bulging lumbar disc     Cervical disc disease     Diabetes mellitus ([address]     diet controlled    Ectopic pregnancy     Fasciitis     Right    Fibromyalgia     GERD (gastroesophageal reflux disease)     Heart murmur     benign    Herpes genitalia     Hyperlipidemia     Hypertension     IBS (irritable bowel syndrome)     Migraines     Ovarian cyst     PONV (postoperative nausea and vomiting)     with anesthesia    Stage 3 chronic kidney disease ([address]     follows w Dr Allen Persons    Thyroid disease      Past Surgical History:   Procedure Laterality Date    CERVICAL FUSION  07/30/2014    anterior cervical discetomy fusion C6-C7    CERVICAL SPINE SURGERY      has a plate    COLONOSCOPY      DILATION AND CURETTAGE OF UTERUS      ECHO COMPL W DOP COLOR FLOW  08/24/2013         ENDOSCOPY, COLON, DIAGNOSTIC      KNEE ARTHROSCOPY Right 2014    KNEE ARTHROSCOPY Right 04/01/2016    Arthroscopy right knee chondroplasty synovectomy medial and lateral menisectomy    KNEE ARTHROSCOPY Left 12/16/2016    left knee arthoscopy medial meniscectomy chodroplasty synovectomy    KNEE ARTHROSCOPY Right 07/10/2020    RIGHT KNEE ARTHROSCOPY, MEDIAL MENISCECTOMY AND DEBRIDEMENT performed by Alea Spence DO at [facility] ARTHROSCOPY Left 3/11/2022    Left knee arthroscopy and debridement performed by Alea Spence DO at [facility] Right 5/26/2021    EXCISION SOFT TISSUE NEOPLASM RIGTH LEG performed by Arina Jimenes MD at [address] Bilateral 12/06/2016    lumbar facet #1    OTHER SURGICAL HISTORY      partial rt  fallopian tube removed    MA SHOULDER SCOPE BONE SHAVING Left 04/12/2018    LEFT SHOULDER ARTHROSCOPY, SUBACROMIAL DECOMPRESSION AND DEBRIDEMENT performed by Blossom Schwarz DO at [address] ARTHROSCOPY Right 01/03/2020    RIGHT SHOULDER ARTHROSCOPY, SUBACROMIAL DECOMPRESSION AND DEBRIDEMENT LABRIUM performed by Blossom Schwarz DO at [address] [facility]         Current Outpatient Medications:     Ubrogepant (UBRELVY) 100 MG TABS, Take 100 mg by mouth daily as needed (migraine) Repeat in 2 hours if first dose ineffective; no more than 2 doses in 24 hours, Disp: 48 tablet, Rfl: 3    topiramate (TOPAMAX) 100 MG tablet, Take 100 mg by mouth 2 times daily, Disp: , Rfl:     ondansetron (ZOFRAN) 8 MG tablet, Take 1 tablet by mouth every 8 hours as needed for Nausea or Vomiting, Disp: 30 tablet, Rfl: 0    Multiple Vitamins-Minerals (THERAPEUTIC MULTIVITAMIN-MINERALS) tablet, Take 1 tablet by mouth daily, Disp: , Rfl:     Cetirizine HCl (ZYRTEC PO), Take 10 mg by mouth daily , Disp: , Rfl:     lidocaine (LMX) 4 % cream, , Disp: , Rfl:     metFORMIN (GLUCOPHAGE) 500 MG tablet, Take 500 mg by mouth 2 times daily (with meals) , Disp: , Rfl:     levothyroxine (SYNTHROID) 150 MCG tablet, Take 150 mcg by mouth Daily , Disp: , Rfl:     vitamin D (ERGOCALCIFEROL) 1.25 MG (63967 UT) CAPS capsule, once a week , Disp: , Rfl:     simvastatin (ZOCOR) 5 MG tablet, Take 5 mg by mouth nightly , Disp: , Rfl:     atenolol (TENORMIN) 25 MG tablet, Take 1 tablet by mouth daily (Patient taking differently: Take 25 mg by mouth in the morning and at bedtime Takes 1/2 tab PM), Disp: 30 tablet, Rfl: 0    Cholecalciferol (VITAMIN D3) 50 MCG (2000 UT) CAPS, Take by mouth daily, Disp: , Rfl:     oxyCODONE-acetaminophen (PERCOCET) 5-325 MG per tablet, Take 1 tablet by mouth every 8 hours as needed for Pain., Disp: , Rfl:     albuterol sulfate  (90 Base) MCG/ACT inhaler, Inhale 1 puff into the lungs every 4 hours as needed , Disp: , Rfl: 5    Milnacipran HCl (SAVELLA PO), Take 50 mg by mouth 2 times daily , Disp: , Rfl:     diclofenac sodium 1 % GEL, Apply 2 g topically 4 times daily as needed for Pain Using 4 percent, Disp: , Rfl:     tiZANidine (ZANAFLEX) 4 MG tablet, Take 4 mg by mouth every 8 hours as needed Talking 3 times a day, Disp: , Rfl:     amitriptyline (ELAVIL) 25 MG tablet, Take 25 mg by mouth nightly, Disp: , Rfl:     Elastic Bandages & Supports (KNEE BRACE) MISC, Hinged knee brace to left knee, Disp: 1 each, Rfl: 0    omeprazole (PRILOSEC) 40 MG delayed release capsule, Take 20 mg by mouth in the morning and at bedtime , Disp: , Rfl:     montelukast (SINGULAIR) 10 MG tablet, Take 10 mg by mouth nightly., Disp: , Rfl:     valACYclovir (VALTREX) 500 MG tablet, Take 500 mg by mouth as needed , Disp: , Rfl:   Allergies   Allergen Reactions    Aspirin Anaphylaxis and Nausea Only    Mobic [Meloxicam] Other (See Comments)     \"Almost a stroke, couldn't talk, right side numbness\"    Other      STATES ALL STEROIDS  cause  Migraine,.rash.& difficulty breathing    Cortisone Hives, Nausea And Vomiting and Other (See Comments)      heart rate goes up, massive migraines facial swelling      Darvocet A500 [Propoxyphene N-Acetaminophen] Hives, Nausea And Vomiting and Other (See Comments)     Massive migraines    Folic Acid Hives    Gabapentin Other (See Comments)     Dizziness    Propoxyphene Hives     hives    Tape [Adhesive Tape] Dermatitis     \"skin spots\"     Social History     Socioeconomic History    Marital status:      Spouse name: Not on file    Number of children: Not on file    Years of education: Not on file    Highest education level: Not on file   Occupational History    Not on file   Tobacco Use    Smoking status: Never    Smokeless tobacco: Never   Vaping Use    Vaping Use: Never used   Substance and Sexual Activity    Alcohol use: No    Drug use: No    Sexual activity: Not on file   Other Topics Concern    Not on file   Social History Narrative    Not on file     Social Determinants of Health     Financial Resource Strain: Not on file   Food Insecurity: Not on file   Transportation Needs: Not on file   Physical Activity: Not on file   Stress: Not on file   Social Connections: Not on file   Intimate Partner Violence: Not on file   Housing Stability: Not on file     Family History   Problem Relation Age of Onset    Diabetes Mother     Migraines Mother     Hypertension Mother     Bipolar Disorder Mother     Ovarian Cancer Sister     Bipolar Disorder Sister     Depression Sister     Mental Illness Other     Arthritis Other     Hypertension Other     No Known Problems Father        Review of Systems:     Skin: (-) rash,(-) psoriasis,(-) eczema, (-)skin cancer. Musculoskeletal: (-) fractures,  (-) dislocations,(-) collagen vascular disease, (-) fibromyalgia, (-) multiple sclerosis, (-) muscular dystrophy, (-) RSD,(-) joint pain (-)swelling, (-) joint pain,swelling. Neurologic: (-) epilepsy, (-)seizures,(-) brain tumor,(-) TIA, (-)stroke, (-)headaches, (-)Parkinson disease,(-) memory loss, (-) LOC. Cardiovascular: (-) Chest pain, (-) swelling in legs/feet, (-) SOB, (-) cramping in legs/feet with walking. Constitutional:  The patient is alert and oriented x 3, appears to be stated age and in no distress. Temp 98 °F (36.7 °C)   Ht 5' 3\" (1.6 m)   Wt 227 lb (103 kg)   BMI 40.21 kg/m²     Skin:  Upon inspection: the skin appears warm, dry and intact. There is not a previous scar over the affected area. There is not any cellulitis, lymphedema or cutaneous lesions noted in the lower extremities. Upon palpation there is no induration noted.       Neurologic:  Gait: normal;  Motor exam of the lower extremities show ; quadriceps, hamstrings, foot dorsi and plantar flexors intact R.  5/5 and L. 5/5. Deep tendon reflexes are 2/4 at the knees and 2/4 at the ankles with strong extensor hallicus longus motor strength bilaterally. Sensory to both feet is intact to all sensory roots. Cardiovascular: The vascular exam is normal and is well perfused to distal extremities. Distal pulses DP/PT: R. 2+; L. 2+. There is cap refill noted less than two seconds in all digits. There is not edema of the bilateral lower extremities. There is not varicosities noted in the distal extremities. Lymph:  Upon palpation,  there is no lymphadenopathy noted in bilateral lower extremities. Musculoskeletal:  Gait: antalgic; examination of the nails and digits reveal no cyanosis or clubbing    Lumbar exam:  On visual inspection, there is no deformity of the spine. full range of motion, no tenderness, palpable spasm or pain on motion. Special tests: Straight Leg Raise negative, Omer testnegative. Hip exam:  Upon inspection, there is no deformity noted. Upon palpation there is not tenderness. ROM: is   full and semetrical.   Strength: Hip Flexors 5/5; Hip Abductors 5/5; Hip Adduction 5/5. Knee exam:  Right knee exam shows;  range of motion of R. Knee is 0 to 120, and L. Knee is 0 to 120. She does have  pain on motion, effusion is mild, there is tenderness over the  medial, anterior region, there are not any masses, there is not ligamentous instability, there is  deformity noted. Knee exam: bilateral positive for moderate crepitations, some mild tenderness laxity is not noted with  stress.       R. Knee:  Lachman's negative, Anterior Drawer negative, Posterior Drawer negative  Miranda's positive, Thallasy  positive,   PF grind test negative, Apprehension test negative, Patellar J sign  negative  L. Knee:  Lachman's negative, Anterior Drawer negative, Posterior Drawer negative  Miranda's negative, Thallasy  negative,   PF grind test negative, Apprehension test negative,  Patellar J sign  negative    Xrays: Moderate tricompartmental arthritis    MRI:    N/a  Radiographic findings reviewed with patient    Impression:  Encounter Diagnosis   Name Primary? Acute medial meniscus tear, right, initial encounter Yes       Plan:   Natural history and expected course discussed. Questions answered. Educational materials distributed. Rest, ice, compression, and elevation (RICE) therapy. Reduction in offending activity.   Mri right knee

## 2022-10-03 NOTE — PROGRESS NOTES
[address] [facility] ONCOLOGY  [facility] [address]  Dept: [phone]  Loc: [phone]  Attending progress note      Reason for Visit: Iron deficiency. Referring Physician:  NADIR Velásquez CNP    PCP:  NADIR Velásquez CNP    History of Present Illness:      Mrs. Raimundo Whatley is a pleasant 80-year-old lady, with a past medical history significant for asthma, anxiety and depression, DM, GERD, CKD, hyperlipidemia, hypertension, and fibromyalgia, who was referred to the hematology office for evaluation of iron deficiency that was not responsive to the oral iron. The patient's hemoglobin hematocrit are normal, but she has persistent iron deficiency, she had blood work done with Dr. Ijeoma Gao, was told she needed parenteral iron infusion. She had GI side effects with the oral iron. The patient received the first dose of Feraheme on 1/6/2022, she had nausea for 2 days after receiving the infusion. She received Zofran with the second dose. The patient is feeling tired, her periods are little bit heavy. She is working on weight loss. She has knee pain. Review of Systems;  CONSTITUTIONAL: No fever, chills. Good appetite, positive for fatigue. ENMT: Eyes: No diplopia; Nose: No epistaxis. Mouth: No sore throat. RESPIRATORY: No hemoptysis, shortness of breath, cough. CARDIOVASCULAR: No chest pain, palpitations. GASTROINTESTINAL: Positive for nausea, no abdominal pain, diarrhea/constipation. GENITOURINARY: No dysuria, urinary frequency, hematuria. NEURO: No syncope, presyncope, pos for headache.    Remainder:  ROS NEGATIVE    Past Medical History:      Diagnosis Date    Anxiety and depression     Arthritis     Asthma     Bulging lumbar disc     Cervical disc disease     Diabetes mellitus (HCC)     diet controlled    Ectopic pregnancy     Fasciitis     Right    Fibromyalgia     GERD (gastroesophageal reflux disease) Heart murmur     benign    Herpes genitalia     Hyperlipidemia     Hypertension     IBS (irritable bowel syndrome)     Migraines     Ovarian cyst     PONV (postoperative nausea and vomiting)     with anesthesia    Stage 3 chronic kidney disease ([address]     follows w Dr Ladonna Esparza    Thyroid disease      Patient Active Problem List   Diagnosis    Asthma    Migraines    SLE (systemic lupus erythematosus) ([address]    Primary osteoarthritis of right knee    Primary osteoarthritis of left knee    Displacement of lumbar intervertebral disc    Neural foraminal stenosis of lumbar spine    Lumbar facet arthropathy    Morbid obesity ([address]    Essential hypertension    Type 2 diabetes mellitus without complication, without long-term current use of insulin ([address]    Pure hypercholesterolemia    Soft tissue neoplasm    Anemia        Past Surgical History:      Procedure Laterality Date    CERVICAL FUSION  07/30/2014    anterior cervical discetomy fusion C6-C7    CERVICAL SPINE SURGERY      has a plate    COLONOSCOPY      DILATION AND CURETTAGE OF UTERUS      ECHO COMPL W DOP COLOR FLOW  08/24/2013         ENDOSCOPY, COLON, DIAGNOSTIC      KNEE ARTHROSCOPY Right 2014    KNEE ARTHROSCOPY Right 04/01/2016    Arthroscopy right knee chondroplasty synovectomy medial and lateral menisectomy    KNEE ARTHROSCOPY Left 12/16/2016    left knee arthoscopy medial meniscectomy chodroplasty synovectomy    KNEE ARTHROSCOPY Right 07/10/2020    RIGHT KNEE ARTHROSCOPY, MEDIAL MENISCECTOMY AND DEBRIDEMENT performed by Bert Marr DO at [facility] ARTHROSCOPY Left 3/11/2022    Left knee arthroscopy and debridement performed by Bert Marr DO at [facility] Right 5/26/2021    EXCISION SOFT TISSUE NEOPLASM RIGTH LEG performed by Nithya Tee MD at [address] Bilateral 12/06/2016    lumbar facet #1    OTHER SURGICAL HISTORY      partial rt  fallopian tube removed    FL SHOULDER SCOPE BONE SHAVING Left 04/12/2018    LEFT SHOULDER ARTHROSCOPY, SUBACROMIAL DECOMPRESSION AND DEBRIDEMENT performed by Gatha Plate, DO at [address] ARTHROSCOPY Right 01/03/2020    RIGHT SHOULDER ARTHROSCOPY, SUBACROMIAL DECOMPRESSION AND DEBRIDEMENT LABRIUM performed by Gatha Plate, DO at [facility]         Family History:  Family History   Problem Relation Age of Onset    Diabetes Mother     Migraines Mother     Hypertension Mother     Bipolar Disorder Mother     Ovarian Cancer Sister     Bipolar Disorder Sister     Depression Sister     Mental Illness Other     Arthritis Other     Hypertension Other     No Known Problems Father        Medications:  Reviewed and reconciled. Social History:  Social History     Socioeconomic History    Marital status:      Spouse name: Not on file    Number of children: Not on file    Years of education: Not on file    Highest education level: Not on file   Occupational History    Not on file   Tobacco Use    Smoking status: Never    Smokeless tobacco: Never   Vaping Use    Vaping Use: Never used   Substance and Sexual Activity    Alcohol use: No    Drug use: No    Sexual activity: Not on file   Other Topics Concern    Not on file   Social History Narrative    Not on file     Social Determinants of Health     Financial Resource Strain: Not on file   Food Insecurity: Not on file   Transportation Needs: Not on file   Physical Activity: Not on file   Stress: Not on file   Social Connections: Not on file   Intimate Partner Violence: Not on file   Housing Stability: Not on file       Allergies:   Allergies   Allergen Reactions    Aspirin Anaphylaxis and Nausea Only    Mobic [Meloxicam] Other (See Comments)     \"Almost a stroke, couldn't talk, right side numbness\"    Other      STATES ALL STEROIDS  cause  Migraine,.rash.& difficulty breathing    Cortisone Hives, Nausea And Vomiting and Other (See Comments)      heart rate goes up, massive migraines facial swelling      Darvocet A500 [Propoxyphene N-Acetaminophen] Hives, Nausea And Vomiting and Other (See Comments)     Massive migraines    Folic Acid Hives    Gabapentin Other (See Comments)     Dizziness    Propoxyphene Hives     hives    Tape [Adhesive Tape] Dermatitis     \"skin spots\"       Physical Exam:  BP (!) 136/101   Pulse 77   Temp 98.1 °F (36.7 °C)   Ht 5' 3\" (1.6 m)   Wt 227 lb 6.4 oz (103.1 kg)   SpO2 100%   BMI 40.28 kg/m²   GENERAL: Alert, oriented x 3, not in acute distress. HEENT: PERRLA; EOMI. Oropharynx clear. NECK: Supple. No palpable cervical or supraclavicular lymphadenopathy. LUNGS: Good air entry bilaterally. No wheezing, crackles or rhonchi. CARDIOVASCULAR: Regular rate. No murmurs, rubs or gallops. ABDOMEN: Soft. Non-tender, non-distended. Positive bowel sounds. EXTREMITIES: Without clubbing, cyanosis, or edema. NEUROLOGIC: No focal deficits. ECOG PS 0      Impression/Plan:        Mrs. Bisi Saunders is a pleasant 75-year-old lady, with a past medical history significant for asthma, anxiety and depression, DM, GERD, CKD, hyperlipidemia, hypertension, and fibromyalgia, who was referred to the hematology office for evaluation of iron deficiency that was not responsive to the oral iron. The patient's hemoglobin hematocrit are normal, but she has persistent iron deficiency, she had blood work done with Dr. Paco Lance, was told she needed parenteral iron infusion. The patient is feeling tired, she has heavy menstrual bleeding, no melena or hematochezia. She had GI side effects with the oral iron.      The patient has iron deficiency without anemia, likely secondary to heavy menstrual bleeding, she has not been responding to the oral iron, also had GI side effects from it, recommended parenteral iron infusion as she was symptomatic, discussed with the patient Feraheme x2 doses 1 week apart, the side effects were reviewed with the patient, The patient received the first dose of Feraheme on 1/6/2022, she had nausea, was medicated with Zofran with the second dose FIT test is negative, GI w/up in 2019 was neg for bleeding, she had mild leukopenia with lymphocytopenia, likely reactive, had resolved. The patient is doing well clinically at this time, labs reviewed, she is not anemic, findings consistent with anemia of chronic inflammation, no iron deficiency. She has mild macrocytosis, will check vit B12 and folate with the next visit no indication for parenteral iron infusion at this time. We will monitor her CBCD and iron studies, she will receive parenteral iron infusion as indicated. RTC in 4 months. Thank you for allowing us to participate in the care of Mrs. Jacqueline Hoffman.     Roderick Alfonso MD   HEMATOLOGY/MEDICAL [address] [address] [address]  [address] [address]  [facility] [phone]

## 2022-10-17 ENCOUNTER — OFFICE VISIT (OUTPATIENT)
Dept: ORTHOPEDIC SURGERY | Age: 42
End: 2022-10-17
Payer: COMMERCIAL

## 2022-10-17 VITALS — BODY MASS INDEX: 40.22 KG/M2 | WEIGHT: 227 LBS | HEIGHT: 63 IN

## 2022-10-17 DIAGNOSIS — S83.241A ACUTE MEDIAL MENISCUS TEAR, RIGHT, INITIAL ENCOUNTER: Primary | ICD-10-CM

## 2022-10-17 DIAGNOSIS — M17.11 PRIMARY OSTEOARTHRITIS OF RIGHT KNEE: ICD-10-CM

## 2022-10-17 PROCEDURE — G8484 FLU IMMUNIZE NO ADMIN: HCPCS | Performed by: ORTHOPAEDIC SURGERY

## 2022-10-17 PROCEDURE — G8417 CALC BMI ABV UP PARAM F/U: HCPCS | Performed by: ORTHOPAEDIC SURGERY

## 2022-10-17 PROCEDURE — 99213 OFFICE O/P EST LOW 20 MIN: CPT | Performed by: ORTHOPAEDIC SURGERY

## 2022-10-17 PROCEDURE — G8427 DOCREV CUR MEDS BY ELIG CLIN: HCPCS | Performed by: ORTHOPAEDIC SURGERY

## 2022-10-17 PROCEDURE — 1036F TOBACCO NON-USER: CPT | Performed by: ORTHOPAEDIC SURGERY

## 2022-10-17 NOTE — PROGRESS NOTES
Chief Complaint   Patient presents with    Knee Pain     Right Knee MRI follow up       Emiliano Rondon returns today for follow-up of her right knee pain. she reports this is worse than when I saw her last.  The patient's pain level is a 6+/10. The previous treatment was not successful. she cannot do steroids due to allergies, insurance denied visco supplementation.      Past Medical History:   Diagnosis Date    Anxiety and depression     Arthritis     Asthma     Bulging lumbar disc     Cervical disc disease     Diabetes mellitus ([address]     diet controlled    Ectopic pregnancy     Fasciitis     Right    Fibromyalgia     GERD (gastroesophageal reflux disease)     Heart murmur     benign    Herpes genitalia     Hyperlipidemia     Hypertension     IBS (irritable bowel syndrome)     Migraines     Ovarian cyst     PONV (postoperative nausea and vomiting)     with anesthesia    Stage 3 chronic kidney disease ([address]     follows w Dr Kellie Odom    Thyroid disease      Past Surgical History:   Procedure Laterality Date    CERVICAL FUSION  07/30/2014    anterior cervical discetomy fusion C6-C7    CERVICAL SPINE SURGERY      has a plate    COLONOSCOPY      DILATION AND CURETTAGE OF UTERUS      ECHO COMPL W DOP COLOR FLOW  08/24/2013         ENDOSCOPY, COLON, DIAGNOSTIC      KNEE ARTHROSCOPY Right 2014    KNEE ARTHROSCOPY Right 04/01/2016    Arthroscopy right knee chondroplasty synovectomy medial and lateral menisectomy    KNEE ARTHROSCOPY Left 12/16/2016    left knee arthoscopy medial meniscectomy chodroplasty synovectomy    KNEE ARTHROSCOPY Right 07/10/2020    RIGHT KNEE ARTHROSCOPY, MEDIAL MENISCECTOMY AND DEBRIDEMENT performed by Chinmay Torres DO at [facility] ARTHROSCOPY Left 3/11/2022    Left knee arthroscopy and debridement performed by Chinmay Torres DO at [facility] Right 5/26/2021    EXCISION SOFT TISSUE NEOPLASM RIGTH LEG performed by Alphonso Mukherjee MD at [facility] BLOCK Bilateral 12/06/2016    lumbar facet #1    OTHER SURGICAL HISTORY      partial rt  fallopian tube removed    [place] SHOULDER SCOPE BONE SHAVING Left 04/12/2018    LEFT SHOULDER ARTHROSCOPY, SUBACROMIAL DECOMPRESSION AND DEBRIDEMENT performed by Albino Fowler DO at [address]  ARTHROSCOPY Right 01/03/2020    RIGHT SHOULDER ARTHROSCOPY, SUBACROMIAL DECOMPRESSION AND DEBRIDEMENT LABRIUM performed by Albino Fowler DO at [address] [facility]         Current Outpatient Medications:     Ubrogepant (UBRELVY) 100 MG TABS, Take 100 mg by mouth daily as needed (migraine) Repeat in 2 hours if first dose ineffective; no more than 2 doses in 24 hours, Disp: 48 tablet, Rfl: 3    topiramate (TOPAMAX) 100 MG tablet, Take 100 mg by mouth 2 times daily, Disp: , Rfl:     ondansetron (ZOFRAN) 8 MG tablet, Take 1 tablet by mouth every 8 hours as needed for Nausea or Vomiting, Disp: 30 tablet, Rfl: 0    Multiple Vitamins-Minerals (THERAPEUTIC MULTIVITAMIN-MINERALS) tablet, Take 1 tablet by mouth daily, Disp: , Rfl:     Cetirizine HCl (ZYRTEC PO), Take 10 mg by mouth daily , Disp: , Rfl:     lidocaine (LMX) 4 % cream, , Disp: , Rfl:     metFORMIN (GLUCOPHAGE) 500 MG tablet, Take 500 mg by mouth 2 times daily (with meals) , Disp: , Rfl:     levothyroxine (SYNTHROID) 150 MCG tablet, Take 150 mcg by mouth Daily , Disp: , Rfl:     vitamin D (ERGOCALCIFEROL) 1.25 MG (53858 UT) CAPS capsule, once a week , Disp: , Rfl:     simvastatin (ZOCOR) 5 MG tablet, Take 5 mg by mouth nightly , Disp: , Rfl:     atenolol (TENORMIN) 25 MG tablet, Take 1 tablet by mouth daily (Patient taking differently: Take 25 mg by mouth in the morning and at bedtime Takes 1/2 tab PM), Disp: 30 tablet, Rfl: 0    Cholecalciferol (VITAMIN D3) 50 MCG (2000 UT) CAPS, Take by mouth daily, Disp: , Rfl:     oxyCODONE-acetaminophen (PERCOCET) 5-325 MG per tablet, Take 1 tablet by mouth every 8 hours as needed for Pain., Disp: , Rfl:     albuterol sulfate  (90 Base) MCG/ACT inhaler, Inhale 1 puff into the lungs every 4 hours as needed , Disp: , Rfl: 5    Milnacipran HCl (SAVELLA PO), Take 50 mg by mouth 2 times daily , Disp: , Rfl:     diclofenac sodium 1 % GEL, Apply 2 g topically 4 times daily as needed for Pain Using 4 percent, Disp: , Rfl:     tiZANidine (ZANAFLEX) 4 MG tablet, Take 4 mg by mouth every 8 hours as needed Talking 3 times a day, Disp: , Rfl:     amitriptyline (ELAVIL) 25 MG tablet, Take 25 mg by mouth nightly, Disp: , Rfl:     Elastic Bandages & Supports (KNEE BRACE) MISC, Hinged knee brace to left knee, Disp: 1 each, Rfl: 0    omeprazole (PRILOSEC) 40 MG delayed release capsule, Take 20 mg by mouth in the morning and at bedtime , Disp: , Rfl:     montelukast (SINGULAIR) 10 MG tablet, Take 10 mg by mouth nightly., Disp: , Rfl:     valACYclovir (VALTREX) 500 MG tablet, Take 500 mg by mouth as needed , Disp: , Rfl:   Allergies   Allergen Reactions    Aspirin Anaphylaxis and Nausea Only    Mobic [Meloxicam] Other (See Comments)     \"Almost a stroke, couldn't talk, right side numbness\"    Other      STATES ALL STEROIDS  cause  Migraine,.rash.& difficulty breathing    Cortisone Hives, Nausea And Vomiting and Other (See Comments)      heart rate goes up, massive migraines facial swelling      Darvocet A500 [Propoxyphene N-Acetaminophen] Hives, Nausea And Vomiting and Other (See Comments)     Massive migraines    Folic Acid Hives    Gabapentin Other (See Comments)     Dizziness    Propoxyphene Hives     hives    Tape [Adhesive Tape] Dermatitis     \"skin spots\"     Social History     Socioeconomic History    Marital status:      Spouse name: Not on file    Number of children: Not on file    Years of education: Not on file    Highest education level: Not on file   Occupational History    Not on file   Tobacco Use    Smoking status: Never    Smokeless tobacco: Never   Vaping Use    Vaping Use: Never used   Substance and Sexual Activity    Alcohol use: No    Drug use: No    Sexual activity: Not on file   Other Topics Concern    Not on file   Social History Narrative    Not on file     Social Determinants of Health     Financial Resource Strain: Not on file   Food Insecurity: Not on file   Transportation Needs: Not on file   Physical Activity: Not on file   Stress: Not on file   Social Connections: Not on file   Intimate Partner Violence: Not on file   Housing Stability: Not on file     Family History   Problem Relation Age of Onset    Diabetes Mother     Migraines Mother     Hypertension Mother     Bipolar Disorder Mother     Ovarian Cancer Sister     Bipolar Disorder Sister     Depression Sister     Mental Illness Other     Arthritis Other     Hypertension Other     No Known Problems Father        Review of Systems:     Skin: (-) rash,(-) psoriasis,(-) eczema, (-)skin cancer. Musculoskeletal: (-) fractures,  (-) dislocations,(-) collagen vascular disease, (-) fibromyalgia, (-) multiple sclerosis, (-) muscular dystrophy, (-) RSD,(-) joint pain (-)swelling, (-) joint pain,swelling. Neurologic: (-) epilepsy, (-)seizures,(-) brain tumor,(-) TIA, (-)stroke, (-)headaches, (-)Parkinson disease,(-) memory loss, (-) LOC. Cardiovascular: (-) Chest pain, (-) swelling in legs/feet, (-) SOB, (-) cramping in legs/feet with walking. Constitutional:  The patient is alert and oriented x 3, appears to be stated age and in no distress. Ht 5' 3\" (1.6 m)   Wt 227 lb (103 kg)   BMI 40.21 kg/m²     Skin:  Upon inspection: the skin appears warm, dry and intact. There is not a previous scar over the affected area. There is not any cellulitis, lymphedema or cutaneous lesions noted in the lower extremities. Upon palpation there is no induration noted. Neurologic:  Gait: normal;  Motor exam of the lower extremities show ; quadriceps, hamstrings, foot dorsi and plantar flexors intact R.  5/5 and L. 5/5.  Deep tendon reflexes are 2/4 at the knees and 2/4 at the ankles with strong extensor hallicus longus motor strength bilaterally. Sensory to both feet is intact to all sensory roots. Cardiovascular: The vascular exam is normal and is well perfused to distal extremities. Distal pulses DP/PT: R. 2+; L. 2+. There is cap refill noted less than two seconds in all digits. There is not edema of the bilateral lower extremities. There is not varicosities noted in the distal extremities. Lymph:  Upon palpation,  there is no lymphadenopathy noted in bilateral lower extremities. Musculoskeletal:  Gait: antalgic; examination of the nails and digits reveal no cyanosis or clubbing    Lumbar exam:  On visual inspection, there is no deformity of the spine. full range of motion, no tenderness, palpable spasm or pain on motion. Special tests: Straight Leg Raise negative, Omer testnegative. Hip exam:  Upon inspection, there is no deformity noted. Upon palpation there is not tenderness. ROM: is   full and semetrical.   Strength: Hip Flexors 5/5; Hip Abductors 5/5; Hip Adduction 5/5. Knee exam:  Right knee exam shows;  range of motion of R. Knee is 0 to 120, and L. Knee is 0 to 120. She does have  pain on motion, effusion is mild, there is tenderness over the  medial, anterior region, there are not any masses, there is not ligamentous instability, there is  deformity noted. Knee exam: bilateral positive for moderate crepitations, some mild tenderness laxity is not noted with  stress. R. Knee:  Lachman's negative, Anterior Drawer negative, Posterior Drawer negative  Miranda's positive, Thallasy  positive,   PF grind test negative, Apprehension test negative, Patellar J sign  negative  L. Knee:  Lachman's negative, Anterior Drawer negative, Posterior Drawer negative  Miranda's negative, Thallasy  negative,   PF grind test negative, Apprehension test negative,  Patellar J sign  negative    Xrays:    Moderate tricompartmental arthritis    MRI: Impression   No meniscus or ligament pathology. Mild degenerative changes to the patellofemoral compartment of the knee, new   from prior remote MRI 04/01/2014. Radiographic findings reviewed with patient    Impression:  Encounter Diagnoses   Name Primary? Acute medial meniscus tear, right, initial encounter Yes    Primary osteoarthritis of right knee          Plan:   Natural history and expected course discussed. Questions answered. Educational materials distributed. Rest, ice, compression, and elevation (RICE) therapy. Reduction in offending activity. I had a lengthy discussion with the patient regarding their diagnosis. I explained treatment options including surgical vs non surgical treatment. I reviewed in detail the risks and benefits and outlined the procedure in detail with expected outcomes and possible complications. I also discussed non surgical treatment such as injections (CSI and visco supplementation), physical therapy, topical creams and NSAID's. They have elected for conservative management at this time.    Pt will continue to work on weight loss, fu after 20 lb weight loss

## 2022-11-14 ENCOUNTER — OFFICE VISIT (OUTPATIENT)
Dept: ORTHOPEDIC SURGERY | Age: 42
End: 2022-11-14
Payer: COMMERCIAL

## 2022-11-14 VITALS — WEIGHT: 221 LBS | TEMPERATURE: 98 F | BODY MASS INDEX: 39.16 KG/M2 | HEIGHT: 63 IN

## 2022-11-14 DIAGNOSIS — M17.11 PRIMARY OSTEOARTHRITIS OF RIGHT KNEE: Primary | ICD-10-CM

## 2022-11-14 PROCEDURE — G8417 CALC BMI ABV UP PARAM F/U: HCPCS | Performed by: ORTHOPAEDIC SURGERY

## 2022-11-14 PROCEDURE — G8484 FLU IMMUNIZE NO ADMIN: HCPCS | Performed by: ORTHOPAEDIC SURGERY

## 2022-11-14 PROCEDURE — 1036F TOBACCO NON-USER: CPT | Performed by: ORTHOPAEDIC SURGERY

## 2022-11-14 PROCEDURE — G8427 DOCREV CUR MEDS BY ELIG CLIN: HCPCS | Performed by: ORTHOPAEDIC SURGERY

## 2022-11-14 PROCEDURE — 99214 OFFICE O/P EST MOD 30 MIN: CPT | Performed by: ORTHOPAEDIC SURGERY

## 2022-11-14 NOTE — PROGRESS NOTES
Chief Complaint   Patient presents with    Knee Pain     Right knee pain follow up. Knee continues to be painful. Feels like it has been getting worse and giving out more often. Really has hard time moving knee at night or in morning. Jeffrey Peng returns today for follow-up of her right knee pain. she reports this is worse than when I saw her last.  The patient's pain level is a 6+/10. The previous treatment was not successful. she cannot do steroids due to allergies, insurance denied visco supplementation.  She would like to discuss surgery, she has lost 20 lbs    Past Medical History:   Diagnosis Date    Anxiety and depression     Arthritis     Asthma     Bulging lumbar disc     Cervical disc disease     Diabetes mellitus ([address]     diet controlled    Ectopic pregnancy     Fasciitis     Right    Fibromyalgia     GERD (gastroesophageal reflux disease)     Heart murmur     benign    Herpes genitalia     Hyperlipidemia     Hypertension     IBS (irritable bowel syndrome)     Migraines     Ovarian cyst     PONV (postoperative nausea and vomiting)     with anesthesia    Stage 3 chronic kidney disease ([address]     follows w Dr Preethi Fletcher    Thyroid disease      Past Surgical History:   Procedure Laterality Date    CERVICAL FUSION  07/30/2014    anterior cervical discetomy fusion C6-C7    CERVICAL SPINE SURGERY      has a plate    COLONOSCOPY      DILATION AND CURETTAGE OF UTERUS      ECHO COMPL W DOP COLOR FLOW  08/24/2013         ENDOSCOPY, COLON, DIAGNOSTIC      KNEE ARTHROSCOPY Right 2014    KNEE ARTHROSCOPY Right 04/01/2016    Arthroscopy right knee chondroplasty synovectomy medial and lateral menisectomy    KNEE ARTHROSCOPY Left 12/16/2016    left knee arthoscopy medial meniscectomy chodroplasty synovectomy    KNEE ARTHROSCOPY Right 07/10/2020    RIGHT KNEE ARTHROSCOPY, MEDIAL MENISCECTOMY AND DEBRIDEMENT performed by Danielle Euceda DO at [facility] ARTHROSCOPY Left 3/11/2022    Left knee arthroscopy and debridement performed by Yolis Kang DO at [facility] Right 5/26/2021    EXCISION SOFT TISSUE NEOPLASM RIGTH LEG performed by Pamela Rodgers MD at [address] Bilateral 12/06/2016    lumbar facet #1    OTHER SURGICAL HISTORY      partial rt  fallopian tube removed    TN SHOULDER SCOPE BONE SHAVING Left 04/12/2018    LEFT SHOULDER ARTHROSCOPY, SUBACROMIAL DECOMPRESSION AND DEBRIDEMENT performed by Yolis Kang DO at [address] ARTHROSCOPY Right 01/03/2020    RIGHT SHOULDER ARTHROSCOPY, SUBACROMIAL DECOMPRESSION AND DEBRIDEMENT LABRIUM performed by Yolis Kang DO at [facility]         Current Outpatient Medications:     Ubrogepant (UBRELVY) 100 MG TABS, Take 100 mg by mouth daily as needed (migraine) Repeat in 2 hours if first dose ineffective; no more than 2 doses in 24 hours, Disp: 48 tablet, Rfl: 3    topiramate (TOPAMAX) 100 MG tablet, Take 100 mg by mouth 2 times daily, Disp: , Rfl:     ondansetron (ZOFRAN) 8 MG tablet, Take 1 tablet by mouth every 8 hours as needed for Nausea or Vomiting, Disp: 30 tablet, Rfl: 0    Multiple Vitamins-Minerals (THERAPEUTIC MULTIVITAMIN-MINERALS) tablet, Take 1 tablet by mouth daily, Disp: , Rfl:     Cetirizine HCl (ZYRTEC PO), Take 10 mg by mouth daily , Disp: , Rfl:     lidocaine (LMX) 4 % cream, , Disp: , Rfl:     metFORMIN (GLUCOPHAGE) 500 MG tablet, Take 500 mg by mouth 2 times daily (with meals) , Disp: , Rfl:     levothyroxine (SYNTHROID) 150 MCG tablet, Take 150 mcg by mouth Daily , Disp: , Rfl:     vitamin D (ERGOCALCIFEROL) 1.25 MG (48125 UT) CAPS capsule, once a week , Disp: , Rfl:     simvastatin (ZOCOR) 5 MG tablet, Take 5 mg by mouth nightly , Disp: , Rfl:     atenolol (TENORMIN) 25 MG tablet, Take 1 tablet by mouth daily (Patient taking differently: Take 25 mg by mouth in the morning and at bedtime Takes 1/2 tab PM), Disp: 30 tablet, Rfl: 0    Cholecalciferol (VITAMIN D3) 50 MCG (2000 UT) CAPS, Take by mouth daily, Disp: , Rfl:     oxyCODONE-acetaminophen (PERCOCET) 5-325 MG per tablet, Take 1 tablet by mouth every 8 hours as needed for Pain., Disp: , Rfl:     albuterol sulfate  (90 Base) MCG/ACT inhaler, Inhale 1 puff into the lungs every 4 hours as needed , Disp: , Rfl: 5    Milnacipran HCl (SAVELLA PO), Take 50 mg by mouth 2 times daily , Disp: , Rfl:     diclofenac sodium 1 % GEL, Apply 2 g topically 4 times daily as needed for Pain Using 4 percent, Disp: , Rfl:     tiZANidine (ZANAFLEX) 4 MG tablet, Take 4 mg by mouth every 8 hours as needed Talking 3 times a day, Disp: , Rfl:     amitriptyline (ELAVIL) 25 MG tablet, Take 25 mg by mouth nightly, Disp: , Rfl:     Elastic Bandages & Supports (KNEE BRACE) MISC, Hinged knee brace to left knee, Disp: 1 each, Rfl: 0    omeprazole (PRILOSEC) 40 MG delayed release capsule, Take 20 mg by mouth in the morning and at bedtime , Disp: , Rfl:     montelukast (SINGULAIR) 10 MG tablet, Take 10 mg by mouth nightly., Disp: , Rfl:     valACYclovir (VALTREX) 500 MG tablet, Take 500 mg by mouth as needed , Disp: , Rfl:   Allergies   Allergen Reactions    Aspirin Anaphylaxis and Nausea Only    Mobic [Meloxicam] Other (See Comments)     \"Almost a stroke, couldn't talk, right side numbness\"    Other      STATES ALL STEROIDS  cause  Migraine,.rash.& difficulty breathing    Cortisone Hives, Nausea And Vomiting and Other (See Comments)      heart rate goes up, massive migraines facial swelling      Darvocet A500 [Propoxyphene N-Acetaminophen] Hives, Nausea And Vomiting and Other (See Comments)     Massive migraines    Folic Acid Hives    Gabapentin Other (See Comments)     Dizziness    Propoxyphene Hives     hives    Tape [Adhesive Tape] Dermatitis     \"skin spots\"     Social History     Socioeconomic History    Marital status:      Spouse name: Not on file    Number of children: Not on file    Years of education: Not on file    Highest education level: Not on file   Occupational History    Not on file   Tobacco Use    Smoking status: Never    Smokeless tobacco: Never   Vaping Use    Vaping Use: Never used   Substance and Sexual Activity    Alcohol use: No    Drug use: No    Sexual activity: Not on file   Other Topics Concern    Not on file   Social History Narrative    Not on file     Social Determinants of Health     Financial Resource Strain: Not on file   Food Insecurity: Not on file   Transportation Needs: Not on file   Physical Activity: Not on file   Stress: Not on file   Social Connections: Not on file   Intimate Partner Violence: Not on file   Housing Stability: Not on file     Family History   Problem Relation Age of Onset    Diabetes Mother     Migraines Mother     Hypertension Mother     Bipolar Disorder Mother     Ovarian Cancer Sister     Bipolar Disorder Sister     Depression Sister     Mental Illness Other     Arthritis Other     Hypertension Other     No Known Problems Father        Review of Systems:     Skin: (-) rash,(-) psoriasis,(-) eczema, (-)skin cancer. Musculoskeletal: (-) fractures,  (-) dislocations,(-) collagen vascular disease, (-) fibromyalgia, (-) multiple sclerosis, (-) muscular dystrophy, (-) RSD,(-) joint pain (-)swelling, (-) joint pain,swelling. Neurologic: (-) epilepsy, (-)seizures,(-) brain tumor,(-) TIA, (-)stroke, (-)headaches, (-)Parkinson disease,(-) memory loss, (-) LOC. Cardiovascular: (-) Chest pain, (-) swelling in legs/feet, (-) SOB, (-) cramping in legs/feet with walking. Constitutional:  _Temp 98 °F (36.7 °C)   Ht 5' 3\" (1.6 m)   Wt 221 lb (100.2 kg)   BMI 39.15 kg/m²  Vital signs are stable. In general, patient is awake, alert and oriented X3, in no apparent distress. Examination of HENT reveals normocephalic, atraumatic. PERRLA/EOMI sclera are white. Conjunctivae are clear. TM's are intact. Pharynx is pink and moist.  Uvula and tongue are midline. Heart: Positive S1 and positive S2 with regular rate and rhythm. Lungs: Clear to auscultation bilaterally without rales, rhonchi or wheezes. Abdomen: soft, nontender. Positive bowel sounds. No organomegaly. No guarding or rigidity. The patient is alert and oriented x 3, appears to be stated age and in no distress. Temp 98 °F (36.7 °C)   Ht 5' 3\" (1.6 m)   Wt 221 lb (100.2 kg)   BMI 39.15 kg/m²     Skin:  Upon inspection: the skin appears warm, dry and intact. There is not a previous scar over the affected area. There is not any cellulitis, lymphedema or cutaneous lesions noted in the lower extremities. Upon palpation there is no induration noted. Neurologic:  Gait: normal;  Motor exam of the lower extremities show ; quadriceps, hamstrings, foot dorsi and plantar flexors intact R.  5/5 and L. 5/5. Deep tendon reflexes are 2/4 at the knees and 2/4 at the ankles with strong extensor hallicus longus motor strength bilaterally. Sensory to both feet is intact to all sensory roots. Cardiovascular: The vascular exam is normal and is well perfused to distal extremities. Distal pulses DP/PT: R. 2+; L. 2+. There is cap refill noted less than two seconds in all digits. There is not edema of the bilateral lower extremities. There is not varicosities noted in the distal extremities. Lymph:  Upon palpation,  there is no lymphadenopathy noted in bilateral lower extremities. Musculoskeletal:  Gait: antalgic; examination of the nails and digits reveal no cyanosis or clubbing    Lumbar exam:  On visual inspection, there is no deformity of the spine. full range of motion, no tenderness, palpable spasm or pain on motion. Special tests: Straight Leg Raise negative, Omer testnegative. Hip exam:  Upon inspection, there is no deformity noted. Upon palpation there is not tenderness. ROM: is   full and semetrical.   Strength: Hip Flexors 5/5; Hip Abductors 5/5; Hip Adduction 5/5.      Knee exam:  Right knee exam shows;  range of motion of R. Knee is 0 to 120, and L. Knee is 0 to 120. She does have  pain on motion, effusion is mild, there is tenderness over the  medial, anterior region, there are not any masses, there is not ligamentous instability, there is  deformity noted. Knee exam: bilateral positive for moderate crepitations, some mild tenderness laxity is not noted with  stress. R. Knee:  Lachman's negative, Anterior Drawer negative, Posterior Drawer negative  Miranda's positive, Thallasy  positive,   PF grind test negative, Apprehension test negative, Patellar J sign  negative  L. Knee:  Lachman's negative, Anterior Drawer negative, Posterior Drawer negative  Miranda's negative, Thallasy  negative,   PF grind test negative, Apprehension test negative,  Patellar J sign  negative      MRI:   Impression   No meniscus or ligament pathology. Mild degenerative changes to the patellofemoral compartment of the knee, new   from prior remote MRI 04/01/2014. Radiographic findings reviewed with patient    Impression:  Encounter Diagnosis   Name Primary? Primary osteoarthritis of right knee Yes           Plan:   Natural history and expected course discussed. Questions answered. Educational materials distributed. Rest, ice, compression, and elevation (RICE) therapy. Reduction in offending activity. I had a lengthy discussion with the patient regarding their diagnosis. I explained treatment options including surgical vs non surgical treatment. I reviewed in detail the risks and benefits and outlined the procedure in detail with expected outcomes and possible complications. I also discussed non surgical treatment such as injections (CSI and visco supplementation), physical therapy, topical creams and NSAID's. They have elected for surgical  management at this time.    We discussed various treatment options both surgical and non-surgical.   The patient is unable to ambulate more than 100 feet and is unable to perform the average daily activities including:  Light housework, ADLs, donning clothes, toileting and exercise. Patient has failed previous conservative measures including cortisone injections, NSAIDs, PT, HEP and pain medication and is currently a fall risk to the disability and decreased functioning. The patient wishes to have the total knee arthroplasty. The risks and benefits of a total knee replacement were discussed with the patient. The risks include but are not limited to: infection, injury to blood vessels and nerves, non relief of symptoms, arthrofibrosis of knee, aseptic loosening of prosthesis, intraoperative fracture, blood loss, PE/DVT, MI, dislocation of hip and knee, need for further operative intervention and death. The patient is aware of the risks and wished to proceed with a Right patellofemoral vs total knee replacement on 11/30/22. We will obtain medical clearence from the PCP. At least 30 minutes was spent discussing the diagnosis and treatment options with the patient with at least 50% of the time was spent with decision making and counseling the patient.

## 2022-11-16 ENCOUNTER — TELEPHONE (OUTPATIENT)
Dept: ORTHOPEDIC SURGERY | Age: 42
End: 2022-11-16

## 2022-11-16 ENCOUNTER — PREP FOR PROCEDURE (OUTPATIENT)
Dept: ORTHOPEDIC SURGERY | Age: 42
End: 2022-11-16

## 2022-11-16 NOTE — TELEPHONE ENCOUNTER
Prior Authorization Form:      DEMOGRAPHICS:                     Patient Name:  Nehemias Clayton  Patient :  1980            Insurance:  Payor: Lashae Montanez / Plan: Jerel Macias / Product Type: *No Product type* /   Insurance ID Number:    Payer/Plan Subscr  Sex Relation Sub.  Ins. ID Effective Group Num   1. TIGRE - * JACKIE STANLEY* 1980 Female Self [id number] 10/1/22 [facility] [address]         DIAGNOSIS & PROCEDURE:                       Procedure/Operation: RIGHT KNEE PATELLOFEMORAL ARTHROPLASTY VS TOTAL KNEE ARTHROPLASTY           CPT Code: 17209; 63494    Diagnosis:  RIGHT KNEE DJD    ICD10 Code: M17.11    Location[facility]  [facility]    Surgeon:  LIBBY HELMS    SCHEDULING INFORMATION:                          Date: 22    Time: TO FOLLOW              Anesthesia:  Spinal                                                       Status:  Outpatient        Special Comments:  ILA       Electronically signed by Raghav Tong ATC on 2022 at 3:07 PM

## 2022-11-21 RX ORDER — SODIUM CHLORIDE 9 MG/ML
INJECTION, SOLUTION INTRAVENOUS CONTINUOUS
Status: CANCELLED | OUTPATIENT
Start: 2022-11-21

## 2022-11-22 ENCOUNTER — HOSPITAL ENCOUNTER (OUTPATIENT)
Dept: GENERAL RADIOLOGY | Age: 42
Discharge: HOME | End: 2022-11-24
Payer: COMMERCIAL

## 2022-11-22 ENCOUNTER — ANESTHESIA EVENT (OUTPATIENT)
Dept: OPERATING ROOM | Age: 42
End: 2022-11-22
Payer: COMMERCIAL

## 2022-11-22 ENCOUNTER — HOSPITAL ENCOUNTER (OUTPATIENT)
Dept: PREADMISSION TESTING | Age: 42
Discharge: HOME | End: 2022-11-22
Payer: COMMERCIAL

## 2022-11-22 VITALS
DIASTOLIC BLOOD PRESSURE: 103 MMHG | TEMPERATURE: 97.7 F | HEART RATE: 95 BPM | RESPIRATION RATE: 20 BRPM | WEIGHT: 219.56 LBS | BODY MASS INDEX: 38.89 KG/M2 | SYSTOLIC BLOOD PRESSURE: 151 MMHG | OXYGEN SATURATION: 97 %

## 2022-11-22 DIAGNOSIS — M17.11 PRIMARY OSTEOARTHRITIS OF RIGHT KNEE: Primary | ICD-10-CM

## 2022-11-22 LAB
ALBUMIN SERPL-MCNC: 4.3 G/DL (ref 3.5–5.2)
ALP BLD-CCNC: 71 U/L (ref 35–104)
ALT SERPL-CCNC: 15 U/L (ref 0–32)
ANION GAP SERPL CALCULATED.3IONS-SCNC: 10 MMOL/L (ref 7–16)
APTT: 36.2 SEC (ref 24.5–35.1)
AST SERPL-CCNC: 18 U/L (ref 0–31)
BACTERIA: ABNORMAL /HPF
BASOPHILS ABSOLUTE: 0.02 E9/L (ref 0–0.2)
BASOPHILS RELATIVE PERCENT: 0.5 % (ref 0–2)
BILIRUB SERPL-MCNC: 0.2 MG/DL (ref 0–1.2)
BILIRUBIN URINE: NEGATIVE
BLOOD, URINE: ABNORMAL
BUN BLDV-MCNC: 13 MG/DL (ref 6–20)
CALCIUM SERPL-MCNC: 9.6 MG/DL (ref 8.6–10.2)
CHLORIDE BLD-SCNC: 105 MMOL/L (ref 98–107)
CLARITY: CLEAR
CO2: 25 MMOL/L (ref 22–29)
COLOR: YELLOW
CREAT SERPL-MCNC: 0.9 MG/DL (ref 0.5–1)
EOSINOPHILS ABSOLUTE: 0.05 E9/L (ref 0.05–0.5)
EOSINOPHILS RELATIVE PERCENT: 1.3 % (ref 0–6)
EPITHELIAL CELLS, UA: ABNORMAL /HPF
GFR SERPL CREATININE-BSD FRML MDRD: >60 ML/MIN/1.73
GLUCOSE BLD-MCNC: 102 MG/DL (ref 74–99)
GLUCOSE URINE: NEGATIVE MG/DL
HBA1C MFR BLD: 5.8 % (ref 4–5.6)
HCT VFR BLD CALC: 43.4 % (ref 34–48)
HEMOGLOBIN: 14.4 G/DL (ref 11.5–15.5)
IMMATURE GRANULOCYTES #: 0.01 E9/L
IMMATURE GRANULOCYTES %: 0.3 % (ref 0–5)
INR BLD: 1.2
KETONES, URINE: NEGATIVE MG/DL
LEUKOCYTE ESTERASE, URINE: NEGATIVE
LYMPHOCYTES ABSOLUTE: 0.9 E9/L (ref 1.5–4)
LYMPHOCYTES RELATIVE PERCENT: 23.9 % (ref 20–42)
MCH RBC QN AUTO: 33.3 PG (ref 26–35)
MCHC RBC AUTO-ENTMCNC: 33.2 % (ref 32–34.5)
MCV RBC AUTO: 100.2 FL (ref 80–99.9)
MONOCYTES ABSOLUTE: 0.19 E9/L (ref 0.1–0.95)
MONOCYTES RELATIVE PERCENT: 5 % (ref 2–12)
NEUTROPHILS ABSOLUTE: 2.6 E9/L (ref 1.8–7.3)
NEUTROPHILS RELATIVE PERCENT: 69 % (ref 43–80)
NITRITE, URINE: NEGATIVE
PDW BLD-RTO: 12.5 FL (ref 11.5–15)
PH UA: 6.5 (ref 5–9)
PLATELET # BLD: 223 E9/L (ref 130–450)
PMV BLD AUTO: 11.8 FL (ref 7–12)
POTASSIUM REFLEX MAGNESIUM: 4.1 MMOL/L (ref 3.5–5)
PREALBUMIN: 30 MG/DL (ref 20–40)
PROTEIN UA: NEGATIVE MG/DL
PROTHROMBIN TIME: 13.3 SEC (ref 9.3–12.4)
RBC # BLD: 4.33 E12/L (ref 3.5–5.5)
RBC UA: ABNORMAL /HPF (ref 0–2)
SODIUM BLD-SCNC: 140 MMOL/L (ref 132–146)
SPECIFIC GRAVITY UA: 1.01 (ref 1–1.03)
TOTAL PROTEIN: 6.7 G/DL (ref 6.4–8.3)
UROBILINOGEN, URINE: 0.2 E.U./DL
WBC # BLD: 3.8 E9/L (ref 4.5–11.5)
WBC UA: ABNORMAL /HPF (ref 0–5)

## 2022-11-22 PROCEDURE — 71046 X-RAY EXAM CHEST 2 VIEWS: CPT

## 2022-11-22 PROCEDURE — 83036 HEMOGLOBIN GLYCOSYLATED A1C: CPT

## 2022-11-22 PROCEDURE — 80053 COMPREHEN METABOLIC PANEL: CPT

## 2022-11-22 PROCEDURE — 36415 COLL VENOUS BLD VENIPUNCTURE: CPT

## 2022-11-22 PROCEDURE — 85730 THROMBOPLASTIN TIME PARTIAL: CPT

## 2022-11-22 PROCEDURE — 81001 URINALYSIS AUTO W/SCOPE: CPT

## 2022-11-22 PROCEDURE — 85025 COMPLETE CBC W/AUTO DIFF WBC: CPT

## 2022-11-22 PROCEDURE — 87081 CULTURE SCREEN ONLY: CPT

## 2022-11-22 PROCEDURE — 84134 ASSAY OF PREALBUMIN: CPT

## 2022-11-22 PROCEDURE — 87088 URINE BACTERIA CULTURE: CPT

## 2022-11-22 PROCEDURE — 85610 PROTHROMBIN TIME: CPT

## 2022-11-22 RX ORDER — CETIRIZINE HYDROCHLORIDE 10 MG/1
TABLET ORAL
COMMUNITY
Start: 2022-11-01

## 2022-11-22 ASSESSMENT — PAIN DESCRIPTION - LOCATION: LOCATION: KNEE;NECK

## 2022-11-22 ASSESSMENT — PAIN DESCRIPTION - ORIENTATION: ORIENTATION: RIGHT;LEFT;POSTERIOR

## 2022-11-22 ASSESSMENT — PAIN DESCRIPTION - ONSET: ONSET: ON-GOING

## 2022-11-22 ASSESSMENT — PAIN DESCRIPTION - DESCRIPTORS: DESCRIPTORS: STABBING;THROBBING;TIGHTNESS

## 2022-11-22 ASSESSMENT — PAIN DESCRIPTION - PAIN TYPE: TYPE: CHRONIC PAIN

## 2022-11-22 ASSESSMENT — PAIN SCALES - GENERAL: PAINLEVEL_OUTOF10: 8

## 2022-11-22 NOTE — PROGRESS NOTES
Met with pt and explained CM role  Pt lives with his wife, son, dgt in law and granddgt in a 2 story house with 4 AMBER and 14 steps to 2nd floor  Pt was independent PTA and was able to drive  Pt's PCP is Dr Alistair Denny  No DME avail  No hx of HHC or rehab  No hx of mental health issues or drug or alcohol use  Pt has a prescription plan and uses [facility] in [place], [place]  Primary contact is pt's wife Ruddy Dorsey, contact # [phone]  Pt's wife will provide pt with transportation home upon discharge  Pt does not have a POA  CM reviewed d/c planning process including the following: identifying help at home, patient preference for d/c planning needs, Discharge Lounge, [facility] Meds to Bed program, availability of treatment team to discuss questions or concerns patient and/or family may have regarding understanding medications and recognizing signs and symptoms once discharged  CM also encouraged patient to follow up with all recommended appointments after discharge  Patient advised of importance for patient and family to participate in managing patients medical well being  Patient/caregiver received discharge checklist  Content reviewed  Patient/caregiver encouraged to participate in discharge plan of care prior to discharge home  Patient attended preoperative Total Joint Camp on 11/22/2022. Patient is scheduled to have an elective knee replacement. Patient was educated regarding Disease Process, Medications, Smoking Cessation, Oxygenation, Incentive Spirometry and Deep Breath and Cough, signs and symptoms of postoperative joint infection that include: Fever, Chills, Pain Control, Drainage and Redness, post-op follow up with orthopaedic surgeon, dressing removal, staple removal, ambulatory devices which include a wheeled walker and cane, bed mobility, correct anatomical alignment, active range of motion, proper transferring technique, incision care, infection prevention measures, non-pharmacologic comfort measures, notification of inadequate pain control measures, pain scale for assessing level of pain, pharmacologic pain management, relaxation techniques.

## 2022-11-22 NOTE — ANESTHESIA PRE PROCEDURE
Department of Anesthesiology  Preprocedure Note       Name:  Katya Maza   Age:  43 y.o.  :  1980                                          MRN:  [id number]         Date:  2022      Surgeon: Duke Cerda): Latonya Castellanos DO    Procedure: Procedure(s):  RIGHT KNEE PATELLOFEMORAL ARTHROPLASTY VS TOTAL KNEE ARTHROPLASTY- 22 ARTHROSURFACE AND SMITH AND NEPHEW    Medications prior to admission:   Prior to Admission medications    Medication Sig Start Date End Date Taking?  Authorizing Provider   cetirizine (ZYRTEC) 10 MG tablet TAKE 1 TABLET BY ORAL ROUTE EVERY DAY 22   Historical Provider, MD   Ubrogepant (UBRELVY) 100 MG TABS Take 100 mg by mouth daily as needed (migraine) Repeat in 2 hours if first dose ineffective; no more than 2 doses in 24 hours 22   NADIR Suazo - CNP   topiramate (TOPAMAX) 100 MG tablet Take 100 mg by mouth 2 times daily    Historical Provider, MD   ondansetron (ZOFRAN) 8 MG tablet Take 1 tablet by mouth every 8 hours as needed for Nausea or Vomiting 22   Maggy Ngo MD   lidocaine (LMX) 4 % cream  21   Historical Provider, MD   metFORMIN (GLUCOPHAGE) 500 MG tablet Take 500 mg by mouth 2 times daily (with meals)  21   Historical Provider, MD   levothyroxine (SYNTHROID) 150 MCG tablet Take 150 mcg by mouth Daily  1/15/21   Historical Provider, MD   vitamin D (ERGOCALCIFEROL) 1.25 MG (48331 UT) CAPS capsule once a week  21   Historical Provider, MD   simvastatin (ZOCOR) 5 MG tablet Take 5 mg by mouth nightly  21   Historical Provider, MD   atenolol (TENORMIN) 25 MG tablet Take 1 tablet by mouth daily  Patient taking differently: Take 25 mg by mouth in the morning and at bedtime Takes 1/2 tab PM 20   Israel Spears MD   Cholecalciferol (VITAMIN D3) 50 MCG (2000 UT) CAPS Take by mouth daily    Historical Provider, MD   oxyCODONE-acetaminophen (PERCOCET) 5-325 MG per tablet Take 1 tablet by mouth every 8 hours as needed for Pain. Historical Provider, MD   albuterol sulfate  (90 Base) MCG/ACT inhaler Inhale 1 puff into the lungs every 4 hours as needed  11/7/19   Historical Provider, MD   Milnacipran HCl (SAVELLA PO) Take 50 mg by mouth 2 times daily     Historical Provider, MD   diclofenac sodium 1 % GEL Apply 2 g topically 4 times daily as needed for Pain Using 4 percent    Historical Provider, MD   tiZANidine (ZANAFLEX) 4 MG tablet Take 4 mg by mouth every 8 hours as needed Talking 3 times a day    Historical Provider, MD   amitriptyline (ELAVIL) 25 MG tablet Take 25 mg by mouth nightly    Historical Provider, MD   Elastic Bandages & Supports (KNEE BRACE) MISC Hinged knee brace to left knee 8/1/17   Stepan Balls, DO   omeprazole (PRILOSEC) 40 MG delayed release capsule Take 20 mg by mouth in the morning and at bedtime     Historical Provider, MD   montelukast (SINGULAIR) 10 MG tablet Take 10 mg by mouth nightly.     Historical Provider, MD   valACYclovir (VALTREX) 500 MG tablet Take 500 mg by mouth as needed     Historical Provider, MD       Current medications:    Current Outpatient Medications   Medication Sig Dispense Refill    cetirizine (ZYRTEC) 10 MG tablet TAKE 1 TABLET BY ORAL ROUTE EVERY DAY      Ubrogepant (UBRELVY) 100 MG TABS Take 100 mg by mouth daily as needed (migraine) Repeat in 2 hours if first dose ineffective; no more than 2 doses in 24 hours 48 tablet 3    topiramate (TOPAMAX) 100 MG tablet Take 100 mg by mouth 2 times daily      ondansetron (ZOFRAN) 8 MG tablet Take 1 tablet by mouth every 8 hours as needed for Nausea or Vomiting 30 tablet 0    lidocaine (LMX) 4 % cream       metFORMIN (GLUCOPHAGE) 500 MG tablet Take 500 mg by mouth 2 times daily (with meals)       levothyroxine (SYNTHROID) 150 MCG tablet Take 150 mcg by mouth Daily       vitamin D (ERGOCALCIFEROL) 1.25 MG (78120 UT) CAPS capsule once a week       simvastatin (ZOCOR) 5 MG tablet Take 5 mg by mouth nightly       atenolol (TENORMIN) 25 MG tablet Take 1 tablet by mouth daily (Patient taking differently: Take 25 mg by mouth in the morning and at bedtime Takes 1/2 tab PM) 30 tablet 0    Cholecalciferol (VITAMIN D3) 50 MCG (2000 UT) CAPS Take by mouth daily      oxyCODONE-acetaminophen (PERCOCET) 5-325 MG per tablet Take 1 tablet by mouth every 8 hours as needed for Pain.  albuterol sulfate  (90 Base) MCG/ACT inhaler Inhale 1 puff into the lungs every 4 hours as needed   5    Milnacipran HCl (SAVELLA PO) Take 50 mg by mouth 2 times daily       diclofenac sodium 1 % GEL Apply 2 g topically 4 times daily as needed for Pain Using 4 percent      tiZANidine (ZANAFLEX) 4 MG tablet Take 4 mg by mouth every 8 hours as needed Talking 3 times a day      amitriptyline (ELAVIL) 25 MG tablet Take 25 mg by mouth nightly      Elastic Bandages & Supports (KNEE BRACE) MISC Hinged knee brace to left knee 1 each 0    omeprazole (PRILOSEC) 40 MG delayed release capsule Take 20 mg by mouth in the morning and at bedtime       montelukast (SINGULAIR) 10 MG tablet Take 10 mg by mouth nightly.  valACYclovir (VALTREX) 500 MG tablet Take 500 mg by mouth as needed        No current facility-administered medications for this visit. Allergies: Allergies   Allergen Reactions    Aspirin Anaphylaxis, Nausea Only and Other (See Comments)     Other reaction(s):  Other (See Comments)  Heart rate goes up    Mobic [Meloxicam] Other (See Comments)     \"Almost a stroke, couldn't talk, right side numbness\"    Other      STATES ALL STEROIDS  cause  Migraine,.rash.& difficulty breathing    Prednisone Other (See Comments)    Cortisone Hives, Nausea And Vomiting, Other (See Comments) and Rash      heart rate goes up, massive migraines facial swelling      Darvocet A500 [Propoxyphene N-Acetaminophen] Hives, Nausea And Vomiting and Other (See Comments)     Massive migraines    Folic Acid Hives    Gabapentin Other (See Comments) Dizziness    Propoxyphene Hives and Other (See Comments)     hives    Tape [Adhesive Tape] Dermatitis     \"skin spots\"       Problem List:    Patient Active Problem List   Diagnosis Code    Asthma J45.909    Migraines G43.909    SLE (systemic lupus erythematosus) ([facility]) M32.9    Primary osteoarthritis of right knee M17.11    Primary osteoarthritis of left knee M17.12    Displacement of lumbar intervertebral disc M51.26    Neural foraminal stenosis of lumbar spine M48.061    Lumbar facet arthropathy M47.816    Morbid obesity ([facility]) E66.01    Essential hypertension I10    Type 2 diabetes mellitus without complication, without long-term current use of insulin ([facility]) E11.9    Pure hypercholesterolemia E78.00    Soft tissue neoplasm D49.2    Anemia D64.9       Past Medical History:        Diagnosis Date    Anxiety and depression     Arthritis     Asthma     Bulging lumbar disc     Cervical disc disease     Diabetes mellitus ([facility])     diet controlled    Ectopic pregnancy     Fasciitis     Right    Fibromyalgia     GERD (gastroesophageal reflux disease)     Heart murmur     benign    Herpes genitalia     History of blood transfusion     Hyperlipidemia     Hypertension     IBS (irritable bowel syndrome)     Migraines     Ovarian cyst     PONV (postoperative nausea and vomiting)     with anesthesia    Stage 3 chronic kidney disease ([address]     follows w Dr Sneed [place] Thyroid disease        Past Surgical History:        Procedure Laterality Date    CERVICAL FUSION  07/30/2014    anterior cervical discetomy fusion C6-C7    CERVICAL SPINE SURGERY      has a plate    COLONOSCOPY      DILATION AND CURETTAGE OF UTERUS      ECHO COMPL W DOP COLOR FLOW  08/24/2013         ENDOSCOPY, COLON, DIAGNOSTIC      KNEE ARTHROSCOPY Right 2014    KNEE ARTHROSCOPY Right 04/01/2016    Arthroscopy right knee chondroplasty synovectomy medial and lateral menisectomy    KNEE ARTHROSCOPY Left 12/16/2016    left knee arthoscopy medial meniscectomy chodroplasty synovectomy    KNEE ARTHROSCOPY Right 07/10/2020    RIGHT KNEE ARTHROSCOPY, MEDIAL MENISCECTOMY AND DEBRIDEMENT performed by Latonya Castellanos DO at [facility] ARTHROSCOPY Left 3/11/2022    Left knee arthroscopy and debridement performed by Latonya Castellanos DO at [address] [place] [address] Street Right 5/26/2021    EXCISION SOFT TISSUE NEOPLASM RIGTH LEG performed by Agueda Valladares MD at [address] Bilateral 12/06/2016    lumbar facet #1    OTHER SURGICAL HISTORY      partial rt  fallopian tube removed    FL SHOULDER SCOPE BONE SHAVING Left 04/12/2018    LEFT SHOULDER ARTHROSCOPY, SUBACROMIAL DECOMPRESSION AND DEBRIDEMENT performed by Latonya Castellanos DO at [address] ARTHROSCOPY Right 01/03/2020    RIGHT SHOULDER ARTHROSCOPY, SUBACROMIAL DECOMPRESSION AND DEBRIDEMENT LABRIUM [address] Latony[facility] Castellanos DO [address]         Social History:    Social History     Tobacco Use    Smoking status: Never    Smokeless tobacco: Never   Substance Use Topics    Alcohol use: No                                Counseling given: Not Answered      Vital Signs (Current): There were no vitals filed for this visit.                                            BP Readings from Last 3 Encounters:   11/22/22 (!) 151/103   10/03/22 (!) 136/101   09/28/22 128/78       NPO Status:                                                                                 BMI:   Wt Readings from Last 3 Encounters:   11/22/22 219 lb 9 oz (99.6 kg)   11/14/22 221 lb (100.2 kg)   10/17/22 227 lb (103 kg)     There is no height or weight on file to calculate BMI.    CBC:   Lab Results   Component Value Date/Time    WBC 3.8 11/22/2022 11:00 AM    RBC 4.33 11/22/2022 11:00 AM    HGB 14.4 11/22/2022 11:00 AM    HCT 43.4 11/22/2022 11:00 AM    .2 11/22/2022 11:00 AM    RDW 12.5 11/22/2022 11:00 AM  11/22/2022 11:00 AM       CMP:   Lab Results   Component Value Date/Time     03/07/2022 10:05 AM    K 3.9 03/07/2022 10:05 AM    K 4.1 05/21/2021 10:36 AM     03/07/2022 10:05 AM    CO2 23 03/07/2022 10:05 AM    BUN 16 03/07/2022 10:05 AM    CREATININE 1.0 03/07/2022 10:05 AM    GFRAA >60 03/07/2022 10:05 AM    LABGLOM >60 03/07/2022 10:05 AM    GLUCOSE 104 03/07/2022 10:05 AM    PROT 6.9 11/07/2020 09:05 PM    CALCIUM 9.5 03/07/2022 10:05 AM    BILITOT <0.2 11/07/2020 09:05 PM    ALKPHOS 87 11/07/2020 09:05 PM    AST 20 11/07/2020 09:05 PM    ALT 17 11/07/2020 09:05 PM       POC Tests: No results for input(s): POCGLU, POCNA, POCK, POCCL, POCBUN, POCHEMO, POCHCT in the last 72 hours. Coags:   Lab Results   Component Value Date/Time    PROTIME 13.3 11/22/2022 11:00 AM    INR 1.2 11/22/2022 11:00 AM    APTT 36.2 11/22/2022 11:00 AM       HCG (If Applicable):   Lab Results   Component Value Date    PREGTESTUR NEGATIVE 05/26/2021        ABGs: No results found for: PHART, PO2ART, HCZ1TAG, TTT6DOI, BEART, A8BCMMNJ     Type & Screen (If Applicable):  No results found for: LABABO, LABRH    Drug/Infectious Status (If Applicable):  No results found for: HIV, HEPCAB    COVID-19 Screening (If Applicable):   Lab Results   Component Value Date/Time    COVID19 Not Detected 03/07/2022 10:44 AM           Anesthesia Evaluation  Patient summary reviewed   history of anesthetic complications: PONV. Airway: Mallampati: III  TM distance: >3 FB   Neck ROM: limited  Mouth opening: > = 3 FB   Dental:      Comment: Dentition intact,patient denies any loose teeth.     Pulmonary: breath sounds clear to auscultation  (+) asthma:                            Cardiovascular:    (+) hypertension:, valvular problems/murmurs:, murmur: Grade 1, hyperlipidemia        Rhythm: regular  Rate: normal                 ROS comment:   ECG:   Normal sinus rhythm  Normal ECG  No previous ECGs available  Confirmed by Zenia Denver ([id number]) on 5/21/2021 6:48:18 PM    ECHO:  Left Ventricle   Left ventricular size is grossly normal.   Normal left ventricular wall thickness. Ejection fraction is visually estimated at 61%. No evidence of left ventricular mass or thrombus noted. No regional wall motion abnormalities seen. Right Ventricle   Normal right ventricular size and function. No evidence of a thrombus in the right ventricle. Left Atrium   Left atrium is of normal size. Interatrial septum appears intact. No evidence of thrombus within left atrium. No evidence of mass within left atrium. Right Atrium   Normal right atrium size. Mitral Valve   Physiologic and/or trace mitral regurgitation is present. No mitral valve stenosis present. Tricuspid Valve   Physiologic and/or trace tricuspid regurgitation. Aortic Valve   Structurally normal aortic valve. Aortic valve opens well. The aortic valve is trileaflet. No evidence of aortic valve regurgitation. No hemodynamically significant aortic stenosis is present. Pulmonic Valve   Physiologic and/or trace pulmonic regurgitation present. Pericardial Effusion   No evidence of pericardial effusion. Miscellaneous   The aorta is within normal limits. Conclusions      Summary   Left ventricular size is grossly normal.   Normal left ventricular wall thickness. Ejection fraction is visually estimated at 61%. No evidence of left ventricular mass or thrombus noted. No regional wall motion abnormalities seen. Physiologic and/or trace mitral regurgitation is present. No mitral valve stenosis present. Physiologic and/or trace tricuspid regurgitation. Physiologic and/or trace pulmonic regurgitation present. Regular rhythm.       Signature      ----------------------------------------------------------------   Electronically signed by Concepcion Musa DO(Interpreting   physician) on 08/24/2013 06:45 PM ----------------------------------------------------------------        Neuro/Psych:   (+) neuromuscular disease:, headaches:, psychiatric history:             ROS comment: H/O Motion sickness GI/Hepatic/Renal:   (+) GERD:, morbid obesity (Morbidly obese 38.89)          Endo/Other:    (+) DiabetesType II DM, well controlled, , : arthritis: OA., .                 Abdominal:   (+) obese (Morbidly obese 38.89),           Vascular: Other Findings:             Anesthesia Plan      general and regional     ASA 3     (Right Adductor canal single shot nerve block for post-op pain management. Patient declines having a spinal due to her low back pain.)  Induction: intravenous. MIPS: Postoperative opioids intended and Prophylactic antiemetics administered. Anesthetic plan and risks discussed with patient. Plan discussed with CRNA.                     Madie Garcia MD   11/22/2022

## 2022-11-22 NOTE — CARE COORDINATION
MAY met with patient in PAT. Plan is for a R Patella Vs Total Right Knee on 11/30/22. She states she lives home with her  and her 23year old son. They live in a 4 story home, she states she goes to the basement, and 2 more floors daily, her bathroom is on the 2nd floor and bed on 3rd floor. She states between each level there are anywhere from 5-10 stairs to navigate. She states her  will be with her day of surgery and plan is for him to take her home same day. She has a wheeled walker and wheelchair if needed. She will NEED a shower chair and bedside commode, she would like to use [facility]. Call placed to Dustin Corcoran at [facility] regarding above. Will need DME orders day of surgery and Dustin Jewell will have delivery for between 2-6p on 11/30 for ACC. Discussed need for [facility] for PT and OT, she is in agreement and would like to use [facility]. Referral to Mark Doe at [facility] for start of care 12/1/22, [facility]

## 2022-11-22 NOTE — PROGRESS NOTES
[address] [facility]                                                                                                                    PRE OP INSTRUCTIONS FOR  Fernando Alvarado        Date: 11/22/2022    Date of surgery: 11/30/22   Arrival Time: Hospital will call you between 5pm and 7pm Tuesday evening with your final arrival time for surgery    Do not eat or drink anything after midnight prior to surgery. This includes no water, chewing gum, mints or ice chips. Take the following medications with a small sip of water on the morning of Surgery: Inhaler if needed, Atenolol, Levothyroxine, Omeprazole, Oxycodone if needed, Topamax, Savella     Diabetics may take evening dose of insulin but none after midnight. If you feel symptomatic or low blood sugar morning of surgery drink 1-2 ounces of apple juice only. Aspirin, Ibuprofen, Advil, Naproxen, Vitamin E and other Anti-inflammatory products should be stopped  before surgery  as directed by your physician. Take Tylenol only unless instructed otherwise by your surgeon. Check with your Doctor regarding stopping Plavix, Coumadin, Lovenox, Eliquis, Effient, or other blood thinners. Do not smoke,use illicit drugs and do not drink any alcoholic beverages 24 hours prior to surgery. You may brush your teeth the morning of surgery. DO NOT SWALLOW WATER    You MUST make arrangements for a responsible adult to take you home after your surgery. You will not be allowed to leave alone or drive yourself home. It is strongly suggested someone stay with you the first 24 hrs. Your surgery will be cancelled if you do not have a ride home. PEDIATRIC PATIENTS ONLY:  A parent/legal guardian must accompany a child scheduled for surgery and plan to stay at the hospital until the child is discharged. Please do not bring other children with you.     Please wear simple, loose fitting clothing to the hospital.  Do not bring valuables (money, credit cards, checkbooks, etc.) Do not wear any makeup (including no eye makeup) or nail polish on your fingers or toes. DO NOT wear any jewelry or piercings on day of surgery. All body piercing jewelry must be removed. Shower the night before surgery with _x__Antibacterial soap /TOD WIPES___x_____    TOTAL JOINT REPLACEMENT/HYSTERECTOMY PATIENTS ONLY---Remember to bring Blood Bank bracelet to the hospital on the day of surgery. If you have a Living Will and Durable Power of  for Healthcare, please bring in a copy. If appropriate bring crutches, inspirex, WALKER, CANE etc... Notify your Surgeon if you develop any illness between now and surgery time, cough, cold, fever, sore throat, nausea, vomiting, etc.  Please notify your surgeon if you experience dizziness, shortness of breath or blurred vision between now & the time of your surgery. If you have ___dentures, they will be removed before going to the OR; we will provide you a container. If you wear ___contact lenses or ___glasses, they will be removed; please bring a case for them. To provide excellent care visitors will be limited to 2 in the room at any given time. Please bring picture ID and insurance card. Sleep apnea patients need to bring CPAP AND SETTINGS to hospital on day of surgery. During flu season no children under the age of 15 are permitted in the hospital for the safety of all patients. Other                   Please call AMBULATORY CARE if you have any further questions.    [address] [facility]     [address]

## 2022-11-24 LAB
MRSA CULTURE ONLY: NORMAL
URINE CULTURE, ROUTINE: NORMAL

## 2022-11-30 ENCOUNTER — ANESTHESIA (OUTPATIENT)
Dept: OPERATING ROOM | Age: 42
End: 2022-11-30
Payer: COMMERCIAL

## 2022-11-30 ENCOUNTER — HOSPITAL ENCOUNTER (OUTPATIENT)
Age: 42
Setting detail: OUTPATIENT SURGERY
Discharge: HOME | End: 2022-11-30
Attending: ORTHOPAEDIC SURGERY | Admitting: ORTHOPAEDIC SURGERY
Payer: COMMERCIAL

## 2022-11-30 ENCOUNTER — APPOINTMENT (OUTPATIENT)
Dept: GENERAL RADIOLOGY | Age: 42
End: 2022-11-30
Attending: ORTHOPAEDIC SURGERY
Payer: COMMERCIAL

## 2022-11-30 VITALS
DIASTOLIC BLOOD PRESSURE: 82 MMHG | RESPIRATION RATE: 18 BRPM | OXYGEN SATURATION: 100 % | SYSTOLIC BLOOD PRESSURE: 130 MMHG | TEMPERATURE: 98 F | HEART RATE: 72 BPM

## 2022-11-30 DIAGNOSIS — M17.11 OSTEOARTHRITIS OF RIGHT KNEE: ICD-10-CM

## 2022-11-30 DIAGNOSIS — M17.11 PRIMARY OSTEOARTHRITIS OF RIGHT KNEE: Primary | ICD-10-CM

## 2022-11-30 LAB
HCG, URINE, POC: NEGATIVE
HCT VFR BLD CALC: 41.7 % (ref 34–48)
HEMOGLOBIN: 13.2 G/DL (ref 11.5–15.5)
Lab: NORMAL
METER GLUCOSE: 98 MG/DL (ref 74–99)
NEGATIVE QC PASS/FAIL: NORMAL
POSITIVE QC PASS/FAIL: NORMAL

## 2022-11-30 PROCEDURE — 2500000003 HC RX 250 WO HCPCS: Performed by: NURSE ANESTHETIST, CERTIFIED REGISTERED

## 2022-11-30 PROCEDURE — 85018 HEMOGLOBIN: CPT

## 2022-11-30 PROCEDURE — 73560 X-RAY EXAM OF KNEE 1 OR 2: CPT

## 2022-11-30 PROCEDURE — 6360000002 HC RX W HCPCS: Performed by: PHYSICAL THERAPY ASSISTANT

## 2022-11-30 PROCEDURE — 82962 GLUCOSE BLOOD TEST: CPT

## 2022-11-30 PROCEDURE — 6360000002 HC RX W HCPCS: Performed by: ORTHOPAEDIC SURGERY

## 2022-11-30 PROCEDURE — 2580000003 HC RX 258: Performed by: ANESTHESIOLOGY

## 2022-11-30 PROCEDURE — 97161 PT EVAL LOW COMPLEX 20 MIN: CPT | Performed by: PHYSICAL THERAPIST

## 2022-11-30 PROCEDURE — 27438 REVISE KNEECAP WITH IMPLANT: CPT | Performed by: ORTHOPAEDIC SURGERY

## 2022-11-30 PROCEDURE — 97110 THERAPEUTIC EXERCISES: CPT | Performed by: PHYSICAL THERAPIST

## 2022-11-30 PROCEDURE — C1776 JOINT DEVICE (IMPLANTABLE): HCPCS | Performed by: ORTHOPAEDIC SURGERY

## 2022-11-30 PROCEDURE — 3700000001 HC ADD 15 MINUTES (ANESTHESIA): Performed by: ORTHOPAEDIC SURGERY

## 2022-11-30 PROCEDURE — 2500000003 HC RX 250 WO HCPCS: Performed by: PHYSICAL THERAPY ASSISTANT

## 2022-11-30 PROCEDURE — 2709999900 HC NON-CHARGEABLE SUPPLY: Performed by: ORTHOPAEDIC SURGERY

## 2022-11-30 PROCEDURE — 88305 TISSUE EXAM BY PATHOLOGIST: CPT

## 2022-11-30 PROCEDURE — 85014 HEMATOCRIT: CPT

## 2022-11-30 PROCEDURE — 76942 ECHO GUIDE FOR BIOPSY: CPT | Performed by: ANESTHESIOLOGY

## 2022-11-30 PROCEDURE — 6360000002 HC RX W HCPCS: Performed by: ANESTHESIOLOGY

## 2022-11-30 PROCEDURE — 6360000002 HC RX W HCPCS

## 2022-11-30 PROCEDURE — 7100000001 HC PACU RECOVERY - ADDTL 15 MIN: Performed by: ORTHOPAEDIC SURGERY

## 2022-11-30 PROCEDURE — 2580000003 HC RX 258: Performed by: NURSE ANESTHETIST, CERTIFIED REGISTERED

## 2022-11-30 PROCEDURE — 7100000011 HC PHASE II RECOVERY - ADDTL 15 MIN: Performed by: ORTHOPAEDIC SURGERY

## 2022-11-30 PROCEDURE — 7100000010 HC PHASE II RECOVERY - FIRST 15 MIN: Performed by: ORTHOPAEDIC SURGERY

## 2022-11-30 PROCEDURE — C1713 ANCHOR/SCREW BN/BN,TIS/BN: HCPCS | Performed by: ORTHOPAEDIC SURGERY

## 2022-11-30 PROCEDURE — 6360000002 HC RX W HCPCS: Performed by: NURSE ANESTHETIST, CERTIFIED REGISTERED

## 2022-11-30 PROCEDURE — 97116 GAIT TRAINING THERAPY: CPT | Performed by: PHYSICAL THERAPIST

## 2022-11-30 PROCEDURE — 3700000000 HC ANESTHESIA ATTENDED CARE: Performed by: ORTHOPAEDIC SURGERY

## 2022-11-30 PROCEDURE — 36415 COLL VENOUS BLD VENIPUNCTURE: CPT

## 2022-11-30 PROCEDURE — 2580000003 HC RX 258: Performed by: ORTHOPAEDIC SURGERY

## 2022-11-30 PROCEDURE — 3600000005 HC SURGERY LEVEL 5 BASE: Performed by: ORTHOPAEDIC SURGERY

## 2022-11-30 PROCEDURE — 7100000000 HC PACU RECOVERY - FIRST 15 MIN: Performed by: ORTHOPAEDIC SURGERY

## 2022-11-30 PROCEDURE — 88311 DECALCIFY TISSUE: CPT

## 2022-11-30 PROCEDURE — 6370000000 HC RX 637 (ALT 250 FOR IP): Performed by: ORTHOPAEDIC SURGERY

## 2022-11-30 PROCEDURE — 3600000015 HC SURGERY LEVEL 5 ADDTL 15MIN: Performed by: ORTHOPAEDIC SURGERY

## 2022-11-30 PROCEDURE — 2580000003 HC RX 258: Performed by: PHYSICAL THERAPY ASSISTANT

## 2022-11-30 DEVICE — GEN II 7.5MM RESUR PAT 32MM
Type: IMPLANTABLE DEVICE | Site: KNEE | Status: FUNCTIONAL
Brand: GENESIS II

## 2022-11-30 DEVICE — CEMENT BNE 20ML 40GM FULL DOSE PMMA W/O ANTIBIO M VISC: Type: IMPLANTABLE DEVICE | Site: KNEE | Status: FUNCTIONAL

## 2022-11-30 DEVICE — COMPONENT FEM W11XL21.5MM TAPR POST DISP FOR RESURF SYS: Type: IMPLANTABLE DEVICE | Site: KNEE | Status: FUNCTIONAL

## 2022-11-30 DEVICE — IMPLANTABLE DEVICE: Type: IMPLANTABLE DEVICE | Site: KNEE | Status: FUNCTIONAL

## 2022-11-30 RX ORDER — FENTANYL CITRATE 50 UG/ML
50 INJECTION, SOLUTION INTRAMUSCULAR; INTRAVENOUS ONCE
Status: COMPLETED | OUTPATIENT
Start: 2022-11-30 | End: 2022-11-30

## 2022-11-30 RX ORDER — OXYCODONE HYDROCHLORIDE AND ACETAMINOPHEN 5; 325 MG/1; MG/1
1-2 TABLET ORAL
Qty: 36 TABLET | Refills: 0 | Status: SHIPPED | OUTPATIENT
Start: 2022-11-30 | End: 2022-12-07

## 2022-11-30 RX ORDER — SODIUM CHLORIDE 9 MG/ML
INJECTION, SOLUTION INTRAVENOUS PRN
Status: DISCONTINUED | OUTPATIENT
Start: 2022-11-30 | End: 2022-11-30 | Stop reason: HOSPADM

## 2022-11-30 RX ORDER — SODIUM CHLORIDE 0.9 % (FLUSH) 0.9 %
5-40 SYRINGE (ML) INJECTION PRN
Status: DISCONTINUED | OUTPATIENT
Start: 2022-11-30 | End: 2022-11-30 | Stop reason: HOSPADM

## 2022-11-30 RX ORDER — ONDANSETRON HYDROCHLORIDE 8 MG/1
8 TABLET, FILM COATED ORAL EVERY 8 HOURS PRN
Status: CANCELLED | OUTPATIENT
Start: 2022-11-30

## 2022-11-30 RX ORDER — SODIUM CHLORIDE 0.9 % (FLUSH) 0.9 %
5-40 SYRINGE (ML) INJECTION EVERY 12 HOURS SCHEDULED
Status: DISCONTINUED | OUTPATIENT
Start: 2022-11-30 | End: 2022-11-30 | Stop reason: HOSPADM

## 2022-11-30 RX ORDER — VANCOMYCIN HYDROCHLORIDE 1 G/20ML
INJECTION, POWDER, LYOPHILIZED, FOR SOLUTION INTRAVENOUS PRN
Status: DISCONTINUED | OUTPATIENT
Start: 2022-11-30 | End: 2022-11-30 | Stop reason: ALTCHOICE

## 2022-11-30 RX ORDER — MEPERIDINE HYDROCHLORIDE 25 MG/ML
12.5 INJECTION INTRAMUSCULAR; INTRAVENOUS; SUBCUTANEOUS EVERY 5 MIN PRN
Status: DISCONTINUED | OUTPATIENT
Start: 2022-11-30 | End: 2022-11-30 | Stop reason: HOSPADM

## 2022-11-30 RX ORDER — FENTANYL CITRATE 50 UG/ML
INJECTION, SOLUTION INTRAMUSCULAR; INTRAVENOUS PRN
Status: DISCONTINUED | OUTPATIENT
Start: 2022-11-30 | End: 2022-11-30 | Stop reason: SDUPTHER

## 2022-11-30 RX ORDER — PROPOFOL 10 MG/ML
INJECTION, EMULSION INTRAVENOUS PRN
Status: DISCONTINUED | OUTPATIENT
Start: 2022-11-30 | End: 2022-11-30 | Stop reason: SDUPTHER

## 2022-11-30 RX ORDER — ROPIVACAINE HYDROCHLORIDE 5 MG/ML
INJECTION, SOLUTION EPIDURAL; INFILTRATION; PERINEURAL
Status: COMPLETED
Start: 2022-11-30 | End: 2022-11-30

## 2022-11-30 RX ORDER — ROPIVACAINE HYDROCHLORIDE 5 MG/ML
INJECTION, SOLUTION EPIDURAL; INFILTRATION; PERINEURAL
Status: COMPLETED | OUTPATIENT
Start: 2022-11-30 | End: 2022-11-30

## 2022-11-30 RX ORDER — SODIUM CHLORIDE 0.9 % (FLUSH) 0.9 %
5-40 SYRINGE (ML) INJECTION EVERY 12 HOURS SCHEDULED
Status: CANCELLED | OUTPATIENT
Start: 2022-11-30

## 2022-11-30 RX ORDER — MIDAZOLAM HYDROCHLORIDE 1 MG/ML
INJECTION INTRAMUSCULAR; INTRAVENOUS
Status: COMPLETED
Start: 2022-11-30 | End: 2022-11-30

## 2022-11-30 RX ORDER — MORPHINE SULFATE 2 MG/ML
2 INJECTION, SOLUTION INTRAMUSCULAR; INTRAVENOUS
Status: CANCELLED | OUTPATIENT
Start: 2022-11-30

## 2022-11-30 RX ORDER — ALBUTEROL SULFATE 90 UG/1
1 AEROSOL, METERED RESPIRATORY (INHALATION) EVERY 4 HOURS PRN
Status: CANCELLED | OUTPATIENT
Start: 2022-11-30

## 2022-11-30 RX ORDER — CETIRIZINE HYDROCHLORIDE 10 MG/1
10 TABLET ORAL DAILY
Status: CANCELLED | OUTPATIENT
Start: 2022-11-30

## 2022-11-30 RX ORDER — ONDANSETRON 4 MG/1
4 TABLET, ORALLY DISINTEGRATING ORAL EVERY 8 HOURS PRN
Status: CANCELLED | OUTPATIENT
Start: 2022-11-30

## 2022-11-30 RX ORDER — MIDAZOLAM HYDROCHLORIDE 1 MG/ML
2 INJECTION INTRAMUSCULAR; INTRAVENOUS ONCE
Status: CANCELLED | OUTPATIENT
Start: 2022-11-30

## 2022-11-30 RX ORDER — MONTELUKAST SODIUM 10 MG/1
10 TABLET ORAL NIGHTLY
Status: CANCELLED | OUTPATIENT
Start: 2022-11-30

## 2022-11-30 RX ORDER — TOPIRAMATE 100 MG/1
100 TABLET, FILM COATED ORAL 2 TIMES DAILY
Status: CANCELLED | OUTPATIENT
Start: 2022-11-30

## 2022-11-30 RX ORDER — MEPERIDINE HYDROCHLORIDE 25 MG/ML
INJECTION INTRAMUSCULAR; INTRAVENOUS; SUBCUTANEOUS
Status: COMPLETED
Start: 2022-11-30 | End: 2022-11-30

## 2022-11-30 RX ORDER — ATORVASTATIN CALCIUM 10 MG/1
10 TABLET, FILM COATED ORAL DAILY
Status: CANCELLED | OUTPATIENT
Start: 2022-11-30

## 2022-11-30 RX ORDER — FENTANYL CITRATE 50 UG/ML
50 INJECTION, SOLUTION INTRAMUSCULAR; INTRAVENOUS EVERY 10 MIN PRN
Status: DISCONTINUED | OUTPATIENT
Start: 2022-11-30 | End: 2022-11-30 | Stop reason: HOSPADM

## 2022-11-30 RX ORDER — ONDANSETRON 2 MG/ML
4 INJECTION INTRAMUSCULAR; INTRAVENOUS ONCE
Status: COMPLETED | OUTPATIENT
Start: 2022-11-30 | End: 2022-11-30

## 2022-11-30 RX ORDER — SODIUM CHLORIDE 0.9 % (FLUSH) 0.9 %
5-40 SYRINGE (ML) INJECTION PRN
Status: CANCELLED | OUTPATIENT
Start: 2022-11-30

## 2022-11-30 RX ORDER — CEFAZOLIN 2 G/1
INJECTION, POWDER, FOR SOLUTION INTRAMUSCULAR; INTRAVENOUS
Status: DISCONTINUED
Start: 2022-11-30 | End: 2022-11-30 | Stop reason: HOSPADM

## 2022-11-30 RX ORDER — KETOROLAC TROMETHAMINE 30 MG/ML
30 INJECTION, SOLUTION INTRAMUSCULAR; INTRAVENOUS ONCE
Status: COMPLETED | OUTPATIENT
Start: 2022-11-30 | End: 2022-11-30

## 2022-11-30 RX ORDER — FENTANYL CITRATE 50 UG/ML
INJECTION, SOLUTION INTRAMUSCULAR; INTRAVENOUS
Status: COMPLETED
Start: 2022-11-30 | End: 2022-11-30

## 2022-11-30 RX ORDER — ONDANSETRON 2 MG/ML
INJECTION INTRAMUSCULAR; INTRAVENOUS
Status: COMPLETED
Start: 2022-11-30 | End: 2022-11-30

## 2022-11-30 RX ORDER — MIDAZOLAM HYDROCHLORIDE 1 MG/ML
INJECTION INTRAMUSCULAR; INTRAVENOUS PRN
Status: DISCONTINUED | OUTPATIENT
Start: 2022-11-30 | End: 2022-11-30 | Stop reason: SDUPTHER

## 2022-11-30 RX ORDER — LEVOTHYROXINE SODIUM 0.15 MG/1
150 TABLET ORAL DAILY
Status: CANCELLED | OUTPATIENT
Start: 2022-11-30

## 2022-11-30 RX ORDER — ROCURONIUM BROMIDE 10 MG/ML
INJECTION, SOLUTION INTRAVENOUS PRN
Status: DISCONTINUED | OUTPATIENT
Start: 2022-11-30 | End: 2022-11-30 | Stop reason: SDUPTHER

## 2022-11-30 RX ORDER — SCOLOPAMINE TRANSDERMAL SYSTEM 1 MG/1
1 PATCH, EXTENDED RELEASE TRANSDERMAL
Status: DISCONTINUED | OUTPATIENT
Start: 2022-11-30 | End: 2022-11-30 | Stop reason: HOSPADM

## 2022-11-30 RX ORDER — KETOROLAC TROMETHAMINE 30 MG/ML
INJECTION, SOLUTION INTRAMUSCULAR; INTRAVENOUS
Status: COMPLETED
Start: 2022-11-30 | End: 2022-11-30

## 2022-11-30 RX ORDER — ATENOLOL 25 MG/1
25 TABLET ORAL DAILY
Status: CANCELLED | OUTPATIENT
Start: 2022-11-30

## 2022-11-30 RX ORDER — SODIUM CHLORIDE 9 MG/ML
INJECTION, SOLUTION INTRAVENOUS PRN
Status: CANCELLED | OUTPATIENT
Start: 2022-11-30

## 2022-11-30 RX ORDER — DEXTROSE AND SODIUM CHLORIDE 5; .45 G/100ML; G/100ML
INJECTION, SOLUTION INTRAVENOUS CONTINUOUS
Status: CANCELLED | OUTPATIENT
Start: 2022-11-30

## 2022-11-30 RX ORDER — SODIUM CHLORIDE 9 MG/ML
INJECTION, SOLUTION INTRAVENOUS CONTINUOUS
Status: DISCONTINUED | OUTPATIENT
Start: 2022-11-30 | End: 2022-11-30 | Stop reason: HOSPADM

## 2022-11-30 RX ORDER — DEXAMETHASONE SODIUM PHOSPHATE 10 MG/ML
INJECTION, SOLUTION INTRAMUSCULAR; INTRAVENOUS PRN
Status: DISCONTINUED | OUTPATIENT
Start: 2022-11-30 | End: 2022-11-30 | Stop reason: SDUPTHER

## 2022-11-30 RX ORDER — ONDANSETRON 2 MG/ML
4 INJECTION INTRAMUSCULAR; INTRAVENOUS EVERY 6 HOURS PRN
Status: CANCELLED | OUTPATIENT
Start: 2022-11-30

## 2022-11-30 RX ORDER — MIDAZOLAM HYDROCHLORIDE 1 MG/ML
1 INJECTION INTRAMUSCULAR; INTRAVENOUS ONCE
Status: COMPLETED | OUTPATIENT
Start: 2022-11-30 | End: 2022-11-30

## 2022-11-30 RX ORDER — TIZANIDINE 4 MG/1
4 TABLET ORAL EVERY 8 HOURS PRN
Status: DISCONTINUED | OUTPATIENT
Start: 2022-11-30 | End: 2022-11-30 | Stop reason: HOSPADM

## 2022-11-30 RX ORDER — PROMETHAZINE HYDROCHLORIDE 25 MG/ML
12.5 INJECTION, SOLUTION INTRAMUSCULAR; INTRAVENOUS ONCE
Status: COMPLETED | OUTPATIENT
Start: 2022-11-30 | End: 2022-11-30

## 2022-11-30 RX ORDER — ROPIVACAINE HYDROCHLORIDE 5 MG/ML
45 INJECTION, SOLUTION EPIDURAL; INFILTRATION; PERINEURAL ONCE
Status: DISCONTINUED | OUTPATIENT
Start: 2022-11-30 | End: 2022-11-30 | Stop reason: HOSPADM

## 2022-11-30 RX ORDER — OXYCODONE HYDROCHLORIDE 5 MG/1
10 TABLET ORAL EVERY 4 HOURS PRN
Status: DISCONTINUED | OUTPATIENT
Start: 2022-11-30 | End: 2022-11-30 | Stop reason: HOSPADM

## 2022-11-30 RX ORDER — ONDANSETRON 2 MG/ML
INJECTION INTRAMUSCULAR; INTRAVENOUS PRN
Status: DISCONTINUED | OUTPATIENT
Start: 2022-11-30 | End: 2022-11-30 | Stop reason: SDUPTHER

## 2022-11-30 RX ORDER — SODIUM CHLORIDE 9 MG/ML
INJECTION, SOLUTION INTRAVENOUS CONTINUOUS PRN
Status: DISCONTINUED | OUTPATIENT
Start: 2022-11-30 | End: 2022-11-30 | Stop reason: SDUPTHER

## 2022-11-30 RX ORDER — ACETAMINOPHEN 325 MG/1
650 TABLET ORAL EVERY 6 HOURS
Status: CANCELLED | OUTPATIENT
Start: 2022-12-01

## 2022-11-30 RX ORDER — PANTOPRAZOLE SODIUM 40 MG/1
40 TABLET, DELAYED RELEASE ORAL
Status: CANCELLED | OUTPATIENT
Start: 2022-12-01

## 2022-11-30 RX ORDER — AMITRIPTYLINE HYDROCHLORIDE 25 MG/1
25 TABLET, FILM COATED ORAL NIGHTLY
Status: CANCELLED | OUTPATIENT
Start: 2022-11-30

## 2022-11-30 RX ORDER — SODIUM CHLORIDE, SODIUM LACTATE, POTASSIUM CHLORIDE, CALCIUM CHLORIDE 600; 310; 30; 20 MG/100ML; MG/100ML; MG/100ML; MG/100ML
INJECTION, SOLUTION INTRAVENOUS CONTINUOUS PRN
Status: DISCONTINUED | OUTPATIENT
Start: 2022-11-30 | End: 2022-11-30 | Stop reason: SDUPTHER

## 2022-11-30 RX ORDER — WATER 1000 ML/1000ML
INJECTION, SOLUTION INTRAVENOUS
Status: DISCONTINUED
Start: 2022-11-30 | End: 2022-11-30 | Stop reason: HOSPADM

## 2022-11-30 RX ORDER — NEOSTIGMINE METHYLSULFATE 1 MG/ML
INJECTION, SOLUTION INTRAVENOUS PRN
Status: DISCONTINUED | OUTPATIENT
Start: 2022-11-30 | End: 2022-11-30 | Stop reason: SDUPTHER

## 2022-11-30 RX ORDER — LIDOCAINE HYDROCHLORIDE 20 MG/ML
INJECTION, SOLUTION INTRAVENOUS PRN
Status: DISCONTINUED | OUTPATIENT
Start: 2022-11-30 | End: 2022-11-30 | Stop reason: SDUPTHER

## 2022-11-30 RX ORDER — GLYCOPYRROLATE 0.2 MG/ML
INJECTION INTRAMUSCULAR; INTRAVENOUS PRN
Status: DISCONTINUED | OUTPATIENT
Start: 2022-11-30 | End: 2022-11-30 | Stop reason: SDUPTHER

## 2022-11-30 RX ORDER — MIDAZOLAM HYDROCHLORIDE 1 MG/ML
1 INJECTION INTRAMUSCULAR; INTRAVENOUS PRN
Status: COMPLETED | OUTPATIENT
Start: 2022-11-30 | End: 2022-11-30

## 2022-11-30 RX ADMIN — ONDANSETRON 4 MG: 2 INJECTION INTRAMUSCULAR; INTRAVENOUS at 13:45

## 2022-11-30 RX ADMIN — FENTANYL CITRATE 50 MCG: 50 INJECTION, SOLUTION INTRAMUSCULAR; INTRAVENOUS at 13:15

## 2022-11-30 RX ADMIN — ROPIVACAINE HYDROCHLORIDE 52 ML: 5 INJECTION, SOLUTION EPIDURAL; INFILTRATION; PERINEURAL at 13:06

## 2022-11-30 RX ADMIN — ONDANSETRON 4 MG: 2 INJECTION INTRAMUSCULAR; INTRAVENOUS at 15:45

## 2022-11-30 RX ADMIN — DEXAMETHASONE SODIUM PHOSPHATE 10 MG: 10 INJECTION INTRAMUSCULAR; INTRAVENOUS at 13:45

## 2022-11-30 RX ADMIN — Medication 0.5 MG: at 16:35

## 2022-11-30 RX ADMIN — MEPERIDINE HYDROCHLORIDE 12.5 MG: 25 INJECTION, SOLUTION INTRAMUSCULAR; INTRAVENOUS; SUBCUTANEOUS at 16:20

## 2022-11-30 RX ADMIN — SODIUM CHLORIDE: 900 INJECTION, SOLUTION INTRAVENOUS at 13:15

## 2022-11-30 RX ADMIN — FENTANYL CITRATE 100 MCG: 50 INJECTION, SOLUTION INTRAMUSCULAR; INTRAVENOUS at 13:52

## 2022-11-30 RX ADMIN — MIDAZOLAM 1 MG: 1 INJECTION INTRAMUSCULAR; INTRAVENOUS at 13:04

## 2022-11-30 RX ADMIN — CEFAZOLIN 2000 MG: 2 INJECTION, POWDER, FOR SOLUTION INTRAMUSCULAR; INTRAVENOUS at 16:25

## 2022-11-30 RX ADMIN — HYDROMORPHONE HYDROCHLORIDE 0.5 MG: 1 INJECTION, SOLUTION INTRAMUSCULAR; INTRAVENOUS; SUBCUTANEOUS at 15:52

## 2022-11-30 RX ADMIN — HYDROMORPHONE HYDROCHLORIDE 0.5 MG: 1 INJECTION, SOLUTION INTRAMUSCULAR; INTRAVENOUS; SUBCUTANEOUS at 15:14

## 2022-11-30 RX ADMIN — MEPERIDINE HYDROCHLORIDE 12.5 MG: 25 INJECTION, SOLUTION INTRAMUSCULAR; INTRAVENOUS; SUBCUTANEOUS at 15:43

## 2022-11-30 RX ADMIN — Medication 0.5 MG: at 15:14

## 2022-11-30 RX ADMIN — LIDOCAINE HYDROCHLORIDE 100 MG: 20 INJECTION, SOLUTION INTRAVENOUS at 13:25

## 2022-11-30 RX ADMIN — OXYCODONE 10 MG: 5 TABLET ORAL at 18:12

## 2022-11-30 RX ADMIN — MIDAZOLAM HYDROCHLORIDE 1 MG: 1 INJECTION INTRAMUSCULAR; INTRAVENOUS at 16:15

## 2022-11-30 RX ADMIN — KETOROLAC TROMETHAMINE 30 MG: 30 INJECTION, SOLUTION INTRAMUSCULAR; INTRAVENOUS at 15:22

## 2022-11-30 RX ADMIN — ROCURONIUM BROMIDE 30 MG: 10 INJECTION, SOLUTION INTRAVENOUS at 13:26

## 2022-11-30 RX ADMIN — FENTANYL CITRATE 50 MCG: 50 INJECTION, SOLUTION INTRAMUSCULAR; INTRAVENOUS at 13:25

## 2022-11-30 RX ADMIN — MIDAZOLAM 1 MG: 1 INJECTION INTRAMUSCULAR; INTRAVENOUS at 13:09

## 2022-11-30 RX ADMIN — FENTANYL CITRATE 50 MCG: 50 INJECTION INTRAMUSCULAR; INTRAVENOUS at 13:10

## 2022-11-30 RX ADMIN — FENTANYL CITRATE 50 MCG: 50 INJECTION INTRAMUSCULAR; INTRAVENOUS at 13:05

## 2022-11-30 RX ADMIN — FENTANYL CITRATE 50 MCG: 50 INJECTION, SOLUTION INTRAMUSCULAR; INTRAVENOUS at 13:10

## 2022-11-30 RX ADMIN — MIDAZOLAM 2 MG: 1 INJECTION INTRAMUSCULAR; INTRAVENOUS at 13:09

## 2022-11-30 RX ADMIN — CEFAZOLIN 2000 MG: 2 INJECTION, POWDER, FOR SOLUTION INTRAMUSCULAR; INTRAVENOUS at 13:09

## 2022-11-30 RX ADMIN — KETOROLAC TROMETHAMINE 30 MG: 30 INJECTION, SOLUTION INTRAMUSCULAR at 15:22

## 2022-11-30 RX ADMIN — MIDAZOLAM 1 MG: 1 INJECTION INTRAMUSCULAR; INTRAVENOUS at 16:15

## 2022-11-30 RX ADMIN — GLYCOPYRROLATE 0.6 MG: 0.2 INJECTION, SOLUTION INTRAMUSCULAR; INTRAVENOUS at 14:27

## 2022-11-30 RX ADMIN — GLYCOPYRROLATE 0.2 MG: 0.2 INJECTION, SOLUTION INTRAMUSCULAR; INTRAVENOUS at 13:55

## 2022-11-30 RX ADMIN — SODIUM CHLORIDE, POTASSIUM CHLORIDE, SODIUM LACTATE AND CALCIUM CHLORIDE: 600; 310; 30; 20 INJECTION, SOLUTION INTRAVENOUS at 13:45

## 2022-11-30 RX ADMIN — PROPOFOL 200 MG: 10 INJECTION, EMULSION INTRAVENOUS at 13:25

## 2022-11-30 RX ADMIN — Medication 0.5 MG: at 15:52

## 2022-11-30 RX ADMIN — PROMETHAZINE HYDROCHLORIDE 12.5 MG: 25 INJECTION INTRAMUSCULAR; INTRAVENOUS at 15:51

## 2022-11-30 RX ADMIN — Medication 3 MG: at 14:27

## 2022-11-30 RX ADMIN — SODIUM CHLORIDE: 9 INJECTION, SOLUTION INTRAVENOUS at 12:28

## 2022-11-30 RX ADMIN — HYDROMORPHONE HYDROCHLORIDE 0.5 MG: 1 INJECTION, SOLUTION INTRAMUSCULAR; INTRAVENOUS; SUBCUTANEOUS at 16:35

## 2022-11-30 ASSESSMENT — PAIN SCALES - GENERAL
PAINLEVEL_OUTOF10: 10
PAINLEVEL_OUTOF10: 8
PAINLEVEL_OUTOF10: 9
PAINLEVEL_OUTOF10: 10
PAINLEVEL_OUTOF10: 6
PAINLEVEL_OUTOF10: 6
PAINLEVEL_OUTOF10: 10
PAINLEVEL_OUTOF10: 7
PAINLEVEL_OUTOF10: 10
PAINLEVEL_OUTOF10: 0
PAINLEVEL_OUTOF10: 0

## 2022-11-30 ASSESSMENT — PAIN DESCRIPTION - LOCATION
LOCATION: KNEE

## 2022-11-30 ASSESSMENT — PAIN DESCRIPTION - DESCRIPTORS
DESCRIPTORS: ACHING
DESCRIPTORS: ACHING;BURNING;THROBBING

## 2022-11-30 ASSESSMENT — PAIN - FUNCTIONAL ASSESSMENT
PAIN_FUNCTIONAL_ASSESSMENT: PREVENTS OR INTERFERES SOME ACTIVE ACTIVITIES AND ADLS
PAIN_FUNCTIONAL_ASSESSMENT: 0-10

## 2022-11-30 ASSESSMENT — PAIN DESCRIPTION - FREQUENCY
FREQUENCY: CONTINUOUS
FREQUENCY: CONTINUOUS
FREQUENCY: INTERMITTENT

## 2022-11-30 ASSESSMENT — PAIN DESCRIPTION - PAIN TYPE
TYPE: CHRONIC PAIN;SURGICAL PAIN
TYPE: ACUTE PAIN;SURGICAL PAIN
TYPE: CHRONIC PAIN;SURGICAL PAIN

## 2022-11-30 ASSESSMENT — PAIN DESCRIPTION - ORIENTATION
ORIENTATION: RIGHT

## 2022-11-30 ASSESSMENT — PAIN DESCRIPTION - ONSET
ONSET: ON-GOING
ONSET: ON-GOING

## 2022-11-30 NOTE — PROGRESS NOTES
Date:2022  Patient Name: Hilda Damon  MRN: [id number]  : 1980  ROOM #: OR POOL/NONE    Occupational Therapy home care evaluation to be completed. Charge nurse on med surg. notified. Thank you.    Alison Segura OTR/L #[id number]

## 2022-11-30 NOTE — ANESTHESIA PRE PROCEDURE
Department of Anesthesiology  Preprocedure Note       Name:  Chuck Ribeiro   Age:  43 y.o.  :  1980                                          MRN:  [id number]         Date:  2022      Surgeon: Valdo Vazquez): Albino Fowler DO    Procedure: Procedure(s):  RIGHT KNEE PATELLOFEMORAL ARTHROPLASTY VS TOTAL KNEE ARTHROPLASTY- 22 ARTHROSURFACE AND SMITH AND NEPHEW    Medications prior to admission:   Prior to Admission medications    Medication Sig Start Date End Date Taking?  Authorizing Provider   cetirizine (ZYRTEC) 10 MG tablet TAKE 1 TABLET BY ORAL ROUTE EVERY DAY 22   Historical Provider, MD   Ubrogepant (UBRELVY) 100 MG TABS Take 100 mg by mouth daily as needed (migraine) Repeat in 2 hours if first dose ineffective; no more than 2 doses in 24 hours 22   NADIR Woodruff - CNP   topiramate (TOPAMAX) 100 MG tablet Take 100 mg by mouth 2 times daily    Historical Provider, MD   ondansetron (ZOFRAN) 8 MG tablet Take 1 tablet by mouth every 8 hours as needed for Nausea or Vomiting 22   [address] [place] [address], MD   lidocaine (LMX) 4 % cream  21   Historical Provider, MD   metFORMIN (GLUCOPHAGE) 500 MG tablet Take 500 mg by mouth 2 times daily (with meals)  21   Historical Provider, MD   levothyroxine (SYNTHROID) 150 MCG tablet Take 150 mcg by mouth Daily  1/15/21   Historical Provider, MD   vitamin D (ERGOCALCIFEROL) 1.25 MG (12747 UT) CAPS capsule once a week  21   Historical Provider, MD   simvastatin (ZOCOR) 5 MG tablet Take 5 mg by mouth nightly  21   Historical Provider, MD   atenolol (TENORMIN) 25 MG tablet Take 1 tablet by mouth daily  Patient taking differently: Take 25 mg by mouth in the morning and at bedtime Takes 1/2 tab PM 20   Israel Sandhu MD   Cholecalciferol (VITAMIN D3) 50 MCG (2000 UT) CAPS Take by mouth daily    Historical Provider, MD   oxyCODONE-acetaminophen (PERCOCET) 5-325 MG per tablet Take 1 tablet by mouth every 8 hours as needed for Pain. Historical Provider, MD   albuterol sulfate  (90 Base) MCG/ACT inhaler Inhale 1 puff into the lungs every 4 hours as needed  11/7/19   Historical Provider, MD   Milnacipran HCl (SAVELLA PO) Take 50 mg by mouth 2 times daily     Historical Provider, MD   diclofenac sodium 1 % GEL Apply 2 g topically 4 times daily as needed for Pain Using 4 percent    Historical Provider, MD   tiZANidine (ZANAFLEX) 4 MG tablet Take 4 mg by mouth every 8 hours as needed Talking 3 times a day    Historical Provider, MD   amitriptyline (ELAVIL) 25 MG tablet Take 25 mg by mouth nightly    Historical Provider, MD   Elastic Bandages & Supports (KNEE BRACE) MISC Hinged knee brace to left knee 8/1/17   Mana Erb, DO   omeprazole (PRILOSEC) 40 MG delayed release capsule Take 20 mg by mouth in the morning and at bedtime     Historical Provider, MD   montelukast (SINGULAIR) 10 MG tablet Take 10 mg by mouth nightly. Historical Provider, MD   valACYclovir (VALTREX) 500 MG tablet Take 500 mg by mouth as needed     Historical Provider, MD       Current medications:    No current facility-administered medications for this visit. No current outpatient medications on file.      Facility-Administered Medications Ordered in Other Visits   Medication Dose Route Frequency Provider Last Rate Last Admin    sodium chloride flush 0.9 % injection 5-40 mL  5-40 mL IntraVENous 2 times per day Mana Erb, DO        sodium chloride flush 0.9 % injection 5-40 mL  5-40 mL IntraVENous PRN Mana Erb, DO        0.9 % sodium chloride infusion   IntraVENous PRN Mana Erb, DO        ceFAZolin (ANCEF) 2,000 mg in sterile water 20 mL IV syringe  2,000 mg IntraVENous On Call to [address], DO        scopolamine (TRANSDERM-SCOP) transdermal patch 1 patch  1 patch TransDERmal Q72H Mana Erb, DO   1 patch at 11/30/22 1222    0.9 % sodium chloride infusion   IntraVENous Continuous Anitra De Luna MD 20 mL/hr at 11/30/22 1228 New Bag at 11/30/22 1228    ortho mix injection   Injection On Call Yaw Gomez,         fentaNYL (SUBLIMAZE) 100 MCG/2ML injection             midazolam (VERSED) 2 MG/2ML injection             ropivacaine (NAROPIN) 0.5% injection                Allergies: Allergies   Allergen Reactions    Aspirin Anaphylaxis, Nausea Only and Other (See Comments)     Other reaction(s):  Other (See Comments)  Heart rate goes up    Mobic [Meloxicam] Other (See Comments)     \"Almost a stroke, couldn't talk, right side numbness\"    Other      STATES ALL STEROIDS  cause  Migraine,.rash.& difficulty breathing    Prednisone Other (See Comments)    Cortisone Hives, Nausea And Vomiting, Other (See Comments) and Rash      heart rate goes up, massive migraines facial swelling      Darvocet A500 [Propoxyphene N-Acetaminophen] Hives, Nausea And Vomiting and Other (See Comments)     Massive migraines    Folic Acid Hives    Gabapentin Other (See Comments)     Dizziness    Propoxyphene Hives and Other (See Comments)     hives    Tape [Adhesive Tape] Dermatitis     \"skin spots\"       Problem List:    Patient Active Problem List   Diagnosis Code    Asthma J45.909    Migraines G43.909    SLE (systemic lupus erythematosus) ([facility] M32.9    Primary osteoarthritis of right knee M17.11    Primary osteoarthritis of left knee M17.12    Displacement of lumbar intervertebral disc M51.26    Neural foraminal stenosis of lumbar spine M48.061    Lumbar facet arthropathy M47.816    Morbid obesity ([facility]) E66.01    Essential hypertension I10    Type 2 diabetes mellitus without complication, without long-term current use of insulin ([facility]) E11.9    Pure hypercholesterolemia E78.00    Soft tissue neoplasm D49.2    Anemia D64.9       Past Medical History:        Diagnosis Date    Anxiety and depression     Arthritis     Asthma     Bulging lumbar disc     Cervical disc disease     Diabetes mellitus ([facility] [address] diet controlled    Ectopic pregnancy     Fasciitis     Right    Fibromyalgia     GERD (gastroesophageal reflux disease)     Heart murmur     benign    Herpes genitalia     History of blood transfusion     Hyperlipidemia     Hypertension     IBS (irritable bowel syndrome)     Migraines     Ovarian cyst     PONV (postoperative nausea and vomiting)     with anesthesia    Stage 3 chronic kidney disease ([address]     follows w Dr Alberta Hearn Thyroid disease        Past Surgical History:        Procedure Laterality Date    CERVICAL FUSION  07/30/2014    anterior cervical discetomy fusion C6-C7    CERVICAL SPINE SURGERY      has a plate    COLONOSCOPY      DILATION AND CURETTAGE OF UTERUS      ECHO COMPL W DOP COLOR FLOW  08/24/2013         ENDOSCOPY, COLON, DIAGNOSTIC      KNEE ARTHROSCOPY Right 2014    KNEE ARTHROSCOPY Right 04/01/2016    Arthroscopy right knee chondroplasty synovectomy medial and lateral menisectomy    KNEE ARTHROSCOPY Left 12/16/2016    left knee arthoscopy medial meniscectomy chodroplasty synovectomy    KNEE ARTHROSCOPY Right 07/10/2020    RIGHT KNEE ARTHROSCOPY, MEDIAL MENISCECTOMY AND DEBRIDEMENT performed by Devan Man DO at [place] [address] ARTHROSCOPY Left 3/11/2022    Left knee arthroscopy and debridement performed by Devan Man DO at [address] [place] [address] Right 5/26/2021    EXCISION SOFT TISSUE NEOPLASM RIGTH LEG performed by Gt Martinez MD at [facility] Bilateral 12/06/2016    lumbar facet #1    OTHER SURGICAL HISTORY      partial rt  fallopian tube removed    HI SHOULDER SCOPE BONE SHAVING Left 04/12/2018    LEFT SHOULDER ARTHROSCOPY, SUBACROMIAL DECOMPRESSION AND DEBRIDEMENT performed by Devan Man DO at [address] [place] [address] ARTHROSCOPY Right 01/03/2020    RIGHT SHOULDER ARTHROSCOPY, SUBACROMIAL DECOMPRESSION AND DEBRIDEMENT LABRIUM performed by Devan Man DO at [facility]  TUBAL LIGATION         Social History:    Social History     Tobacco Use    Smoking status: Never    Smokeless tobacco: Never   Substance Use Topics    Alcohol use: No                                Counseling given: Not Answered      Vital Signs (Current): There were no vitals filed for this visit. BP Readings from Last 3 Encounters:   11/30/22 (!) 140/93   11/22/22 (!) 151/103   10/03/22 (!) 136/101       NPO Status:                                                                                 BMI:   Wt Readings from Last 3 Encounters:   11/22/22 219 lb 9 oz (99.6 kg)   11/14/22 221 lb (100.2 kg)   10/17/22 227 lb (103 kg)     There is no height or weight on file to calculate BMI.    CBC:   Lab Results   Component Value Date/Time    WBC 3.8 11/22/2022 11:00 AM    RBC 4.33 11/22/2022 11:00 AM    HGB 14.4 11/22/2022 11:00 AM    HCT 43.4 11/22/2022 11:00 AM    .2 11/22/2022 11:00 AM    RDW 12.5 11/22/2022 11:00 AM     11/22/2022 11:00 AM       CMP:   Lab Results   Component Value Date/Time     11/22/2022 11:00 AM    K 4.1 11/22/2022 11:00 AM     11/22/2022 11:00 AM    CO2 25 11/22/2022 11:00 AM    BUN 13 11/22/2022 11:00 AM    CREATININE 0.9 11/22/2022 11:00 AM    GFRAA >60 03/07/2022 10:05 AM    LABGLOM >60 11/22/2022 11:00 AM    GLUCOSE 102 11/22/2022 11:00 AM    PROT 6.7 11/22/2022 11:00 AM    CALCIUM 9.6 11/22/2022 11:00 AM    BILITOT 0.2 11/22/2022 11:00 AM    ALKPHOS 71 11/22/2022 11:00 AM    AST 18 11/22/2022 11:00 AM    ALT 15 11/22/2022 11:00 AM       POC Tests: No results for input(s): POCGLU, POCNA, POCK, POCCL, POCBUN, POCHEMO, POCHCT in the last 72 hours.     Coags:   Lab Results   Component Value Date/Time    PROTIME 13.3 11/22/2022 11:00 AM    INR 1.2 11/22/2022 11:00 AM    APTT 36.2 11/22/2022 11:00 AM       HCG (If Applicable):   Lab Results   Component Value Date    PREGTESTUR NEGATIVE 05/26/2021        ABGs: No results found for: PHART, PO2ART, TNQ2RAW, DJT6IKG, BEART, W6IOVRLE     Type & Screen (If Applicable):  No results found for: LABABO, LABRH    Drug/Infectious Status (If Applicable):  No results found for: HIV, HEPCAB    COVID-19 Screening (If Applicable):   Lab Results   Component Value Date/Time    COVID19 Not Detected 03/07/2022 10:44 AM           Anesthesia Evaluation  Patient summary reviewed   history of anesthetic complications: PONV. Airway: Mallampati: III  TM distance: >3 FB   Neck ROM: limited  Mouth opening: > = 3 FB   Dental:      Comment: Dentition intact,patient denies any loose teeth. Pulmonary: breath sounds clear to auscultation  (+) asthma:                            Cardiovascular:    (+) hypertension:, valvular problems/murmurs:, murmur: Grade 1, hyperlipidemia        Rhythm: regular  Rate: normal                 ROS comment:   ECG:   Normal sinus rhythm  Normal ECG  No previous ECGs available  Confirmed by Kendra Eye ([id number]) on 5/21/2021 6:48:18 PM    ECHO:  Left Ventricle   Left ventricular size is grossly normal.   Normal left ventricular wall thickness. Ejection fraction is visually estimated at 61%. No evidence of left ventricular mass or thrombus noted. No regional wall motion abnormalities seen. Right Ventricle   Normal right ventricular size and function. No evidence of a thrombus in the right ventricle. Left Atrium   Left atrium is of normal size. Interatrial septum appears intact. No evidence of thrombus within left atrium. No evidence of mass within left atrium. Right Atrium   Normal right atrium size. Mitral Valve   Physiologic and/or trace mitral regurgitation is present. No mitral valve stenosis present. Tricuspid Valve   Physiologic and/or trace tricuspid regurgitation. Aortic Valve   Structurally normal aortic valve. Aortic valve opens well. The aortic valve is trileaflet. No evidence of aortic valve regurgitation.    No

## 2022-11-30 NOTE — ANESTHESIA PROCEDURE NOTES
Peripheral Block    Patient location during procedure: PACU  Reason for block: post-op pain management and at surgeon's request  Start time: 11/30/2022 1:06 PM  End time: 11/30/2022 1:08 PM  Staffing  Anesthesiologist: Dwayne Ferris MD  Preanesthetic Checklist  Completed: patient identified, IV checked, site marked, risks and benefits discussed, surgical/procedural consents, equipment checked, pre-op evaluation, timeout performed, anesthesia consent given, oxygen available and monitors applied/VS acknowledged  Peripheral Block   Patient position: supine  Prep: ChloraPrep  Provider prep: mask and sterile gloves  Patient monitoring: cardiac monitor, continuous pulse ox, frequent blood pressure checks and IV access  Block type: Femoral  Adductor canal (and Right IPAC.)  Laterality: right  Injection technique: single-shot  Guidance: ultrasound guided  Local infiltration: lidocaine  Local infiltration: lidocaine    Needle   Needle type: combined needle/nerve stimulator   Needle gauge: 20 G  Needle localization: ultrasound guidance  Needle length: 10 cm  Assessment   Injection assessment: negative aspiration for heme, no paresthesia on injection and local visualized surrounding nerve on ultrasound  Paresthesia pain: none  Slow fractionated injection: yes  Hemodynamics: stable  Real-time US image taken/store: yes  Outcomes: uncomplicated    Additional Notes  Versed 2 mgm IV and Fentanyl 100 microgram IV, Right groin prepared, right femoral artery identified, using doppler. Right groin and medial thigh are sterilized and draped. Ultra sound picture at the junction of the upper third and the lower two third of medial thigh, till we got a picture of the femoral artery and vein, then I passed the stimuplex needle on the proximal side of the femoral artery and injected 37 cc of Ropivicaine  0.5% then, Right Inner Knee prepared, Right lower fermur end identified, using doppler.   Right popliteal artery identified by Ultra sound waves. I injected 15 cc of Ropivicaine  0.5% just above the Right Popliteal Artery area. Pictures were taken. No complications.         Medications Administered  ropivacaine (NAROPIN) injection 0.5% - Perineural, Knee Right   52 mL - 11/30/2022 1:06:00 PM

## 2022-11-30 NOTE — PROGRESS NOTES
1304 Time out done.  2505-2489 Right adductor nerve block and IPAC done per Dr. Priscila Neumann well

## 2022-11-30 NOTE — PROGRESS NOTES
Patient took dinner tray well. Requested pain med before exercising with Physical Therapy. Given Roxycodone as ordered. Patient voided 200 cc via bedpan.   visiting at bedside, returned from retrieving prescriptions at their pharmacy

## 2022-11-30 NOTE — CARE COORDINATION
11/30/2022: SS Note/Discharge plan:  SS Consult for post-op d/c planning and HHC/DME orders noted, d/c plan for pt to return home from [facility], [facility] accepted referral for home therapy and start of care tomorrow, [facility] delivered dme to [facility] (bsc and ttb), [facility] notified.  Electronically signed by ROSARIO Haynes on 11/30/2022 at 4:32 PM

## 2022-11-30 NOTE — DISCHARGE INSTRUCTIONS
Your information:  Name: Danitza Wakefield  : 1980    Your instructions:    Discharge home with home care. They will call you with your first appointment. Follow up with primary care provider as directed. Follow up with Dr. Cristhian Samuel on December 15, 8:50am.  Information about your newly prescribed medications has been included in your discharge instructions. Orthopedics Discharge Instructions   Weight bearing Status - Weight bearing as tolerated - Operative Extermity  Pain medication Per Prescription  Ice to operative/injured site for 15-30 minutes of each hour for next 3-5 days    Elevate operative/injured limb on 2 pillows at home  Continue DVT Prophylaxis as Prescribed  Wound care - Leave aquacell in place until 22  Follow Up in Office in 2 weeks with Dr. Cristhian Samuel    Call the office for directions or with any questions. Watch for these significant complications. Call physician if they or any other problems occur:  Fever over 101°, redness, swelling or warmth at the operative site  Unrelieved nausea    Foul smelling or cloudy drainage at the operative site   Unrelieved pain    Blood soaked dressing. (Some oozing may be normal)     Numb, pale, blue, cold or tingling extremity    Signs and symptoms to look out for:  Call 911 anytime you think you may need emergency care. For example, call if:    You passed out (lost consciousness). You have severe trouble breathing. You have sudden chest pain and shortness of breath, or you cough up blood. Call your doctor now or seek immediate medical care if:    You have signs of infection, such as: Increased pain, swelling, warmth, or redness. Red streaks leading from the incision. Pus draining from the incision. A fever. You have signs of a blood clot, such as:  Pain in your calf, back of the knee, thigh, or groin. Redness and swelling in your leg or groin. Your incision comes open and begins to bleed, or the bleeding increases.      You have pain that does not get better after you take pain medicine. Watch closely for changes in your health, and be sure to contact your doctor if:    You do not have a bowel movement after taking a laxative. What to do after you leave the hospital:    Recommended diet: regular diet    Recommended activity: activity as tolerated    The following personal items were collected during your admission and were returned to you:    Belongings  Dental Appliances: None  Vision - Corrective Lenses: Eyeglasses  Hearing Aid: None  Clothing: Undergarments, Footwear, Jacket/Coat, Pants, Shirt, Socks  Jewelry: None  Body Piercings Removed: N/A  Electronic Devices: None  Weapons (Notify Protective Services/Security): None  Home Medications: None  Valuables Given To: Family (Comment)  Responsible person(s) in the waiting room: margarita  Patient approves for provider to speak to responsible person post operatively: Yes    Information obtained by:  By signing below, I understand that if any problems occur once I leave the hospital I am to contact Dr. Cornel Carrera. I understand and acknowledge receipt of the instructions indicated above.

## 2022-11-30 NOTE — OP NOTE
Operative Note      Patient: Mj Chan  YOB: 1980  MRN: [id number]    Date of Procedure: 11/30/2022    Pre-Op Diagnosis: Osteoarthritis of right knee [M17.11]    Post-Op Diagnosis: Same       Procedure(s):  RIGHT KNEE PATELLOFEMORAL ARTHROPLASTY VS TOTAL KNEE ARTHROPLASTY- 11/30/22 ARTHROSURFACE AND JAVED AND NEPHEW    Surgeon(s): Hector Riggs DO    Assistant:   Resident: Melida Camargo DO; Rory Jules DO; Michael Olea DO    Anesthesia: General    Estimated Blood Loss (mL): less than 005     Complications: None    Specimens:   ID Type Source Tests Collected by Time Destination   A : right  patella  Tissue Tissue SURGICAL PATHOLOGY Hector Riggs DO 11/30/2022 1441        Implants:  Implant Name Type Inv. Item Serial No.  Lot No. LRB No. Used Action   COMPONENT FEM P08RK15.5MM TAPR POST DISP FOR RESURF SYS - [id number]  COMPONENT FEM N18HV50.5MM TAPR POST DISP FOR RESURF SYS  ARTHROSURFACE-WD [id number] Right 1 Implanted   COMPONENT ARTC 8.5X5MM OFFSET TROCHLEAR HEMICAP - [id number]  COMPONENT ARTC 8.5X5MM OFFSET TROCHLEAR HEMICAP  ARTHROSURFACE-WD [id number] Right 1 Implanted   CEMENT BNE 20ML 40GM FULL DOSE PMMA W/O ANTIBIO M VISC - [id number]  CEMENT BNE 20ML 40GM FULL DOSE PMMA W/O ANTIBIO M VISC  [employer] HOWM-WD [id number] Right 1 Implanted   COMPONENT PAT [id number] THK7.5MM STD STELLA RESURFACE RND NP W/O - [id number]  COMPONENT PAT [id number] THK7.5MM STD STELLA RESURFACE RND NP W/O  JAVED AND NEPHEW [employer] [id number] Right 1 Implanted         Drains: * No LDAs found *    Findings: as above    Detailed Description of Procedure:   Below            PREOPERATIVE DIAGNOSIS:  right knee DJD, patellofemoral joint. POSTOPERATIVE DIAGNOSIS:  as above      OPERATION:  right knee patellofemoral replacement. ANESTHESIA:  General.      ESTIMATED BLOOD LOSS:  Minimal.      COMPLICATIONS:  None.       OPERATIVE PROCEDURE:  The patient was taken to the operative suite and was   given general anesthesia. The right leg was identified with a preoperative   time-out. Placed a tourniquet on the right thigh,  prepped and draped the leg in sterile fashion. I outlined an incision along the anterior knee. I exsanguinated the limb,   inflated the tourniquet to 300 mmHg. I made a midline incision through skin   and subcutaneous tissue. I dissected down to the extensor mechanism was   identified. I then carried out a medial parapatellar arthrotomy. I everted   the patella, exposing the underlying trochlea. There was significant damage   through trochlear and patellar surface that was grade 4 over a large surface. I then established a center point for the right contoured trochlear   component, both in the mediolateral direction and the superior-inferior. I then placed a guide pin within the center position, centered with our   centering guide. I then reamed over the guide pin to the depth of 5 to the 5   implant. I then put our centering sleeve here and reamed both the superior   and inferior portions of the trochlear surface. I then drilled and placed   our screw within the center portion of the trochlea to appropriate depth to   make sure the depth of the screw was to its proper dimensions. I then went to prepare the patella. The patient's patella was oblong and a   different type of alignment. I then chose to resurface the entire patella. I cut the patella to measure approximately 24 to 26 mm in length. I then   resected approximately 10 to 11 mm to accommodate in the patella component. The size was 32. I drilled our patella after placement. I then obtained the   implants from the back table. I mixed the cement, prepared the bony surface   for cementing, and then inserted a trochlear implant from [employer] and a   dome patella from [employer]. These were held in position after cementing   both into place.   Excess cement was removed and to harden. I then thoroughly   irrigated the wound upon closure once the cement hardened. The trochlea did   track perfectly within the trochlea groove. Following this, it had no   abnormalities. I then closed the medial parapatellar arthrotomy with a 0 Vicryl. I closed   the subcu layer with 2-0 Vicryl followed by skin staples. Sterile dressing   was placed on wound. The patient recovered in recovery room without difficulty. The patient   tolerated the procedure well.      Electronically signed by Faisal Hobbs DO on 11/30/2022 at 2:48 PM

## 2022-11-30 NOTE — PROGRESS NOTES
Physical Therapy    Physical Therapy Initial Evaluation/Plan of Care    Room #:  OR POOL/NONE  Patient Name: Alexandra Cast  YOB: 1980  MRN: [id number]    Date of Service: 11/30/2022     Tentative placement recommendation: Home Health Physical Therapy 5 days/week   Equipment recommendation: Patient has needed equipment       Evaluating Physical Therapist: Johanne Smith  #[id number]     Specific Provider Orders/Date/Referring Provider :  Dr Liliya Katz  PT EVAL and treat 11/30/22    Admitting Diagnosis:   Osteoarthritis of right knee [M17.11]   Visit diagnosis:   Visit Diagnoses         Codes    Osteoarthritis of right knee     M17.11            Patient Active Problem List   Diagnosis    Asthma    Migraines    SLE (systemic lupus erythematosus) ([address]    Primary osteoarthritis of right knee    Primary osteoarthritis of left knee    Displacement of lumbar intervertebral disc    Neural foraminal stenosis of lumbar spine    Lumbar facet arthropathy    Morbid obesity ([address]    Essential hypertension    Type 2 diabetes mellitus without complication, without long-term current use of insulin ([address]    Pure hypercholesterolemia    Soft tissue neoplasm    Anemia        ASSESSMENT of Current Deficits  Safe for discharge home with family at a Supervision  level. Patient will continue to need therapy to maximize function. All questions and concerns addressed. PHYSICAL THERAPY  PLAN OF CARE       Patient and or family understand(s) diagnosis, prognosis, and plan of care.        Prior Level of Function: Patient ambulated independently    Rehab Potential: good for baseline      Past medical history:   Past Medical History:   Diagnosis Date    Anxiety and depression     Arthritis     Asthma     Bulging lumbar disc     Cervical disc disease     Diabetes mellitus ([address]     diet controlled    Ectopic pregnancy     Fasciitis     Right    Fibromyalgia     GERD (gastroesophageal reflux disease)     Heart murmur benign    Herpes genitalia     History of blood transfusion     Hyperlipidemia     Hypertension     IBS (irritable bowel syndrome)     Migraines     Ovarian cyst     PONV (postoperative nausea and vomiting)     with anesthesia    Stage 3 chronic kidney disease ([address]     follows w Dr Gwen Jade    Thyroid disease      Past Surgical History:   Procedure Laterality Date    CERVICAL FUSION  07/30/2014    anterior cervical discetomy fusion C6-C7    CERVICAL SPINE SURGERY      has a plate    COLONOSCOPY      DILATION AND CURETTAGE OF UTERUS      ECHO COMPL W DOP COLOR FLOW  08/24/2013         ENDOSCOPY, COLON, DIAGNOSTIC      KNEE ARTHROSCOPY Right 2014    KNEE ARTHROSCOPY Right 04/01/2016    Arthroscopy right knee chondroplasty synovectomy medial and lateral menisectomy    KNEE ARTHROSCOPY Left 12/16/2016    left knee arthoscopy medial meniscectomy chodroplasty synovectomy    KNEE ARTHROSCOPY Right 07/10/2020    RIGHT KNEE ARTHROSCOPY, MEDIAL MENISCECTOMY AND DEBRIDEMENT performed by Jeni Gregory DO at [facility] ARTHROSCOPY Left 3/11/2022    Left knee arthroscopy and debridement performed by Jeni [facility] DO at [facility] Right 5/26/2021    EXCISION SOFT TISSUE NEOPLASM RIGTH LEG performed by Jose Perez MD at [address] Bilateral 12/06/2016    lumbar facet #1    OTHER SURGICAL HISTORY      partial rt  fallopian tube removed    [place] SHOULDER SCOPE BONE SHAVING Left 04/12/2018    LEFT SHOULDER ARTHROSCOPY, SUBACROMIAL DECOMPRESSION AND DEBRIDEMENT performed by Jeni Gregory DO at [address] [facility] ARTHROSCOPY Right 01/03/2020    RIGHT SHOULDER ARTHROSCOPY, SUBACROMIAL DECOMPRESSION AND DEBRIDEMENT LABRIUM performed by Jeni Gregory DO at [place]:    Precautions:  ambulate patient, falls ,right lower extremity FWB (full weight bearing)     Social history: Patient lives with spouse and adult son in a  multiple levels    with  5-10 steps   to enter with [employer] owned: Wheelchair and [address],       [address] [place] [address] Pkwy   How much difficulty turning over in bed?: A Little  How much difficulty sitting down on / standing up from a chair with arms?: A Little  How much difficulty moving from lying on back to sitting on side of bed?: A Little  How much help from another person moving to and from a bed to a chair?: A Little  How much help from another person needed to walk in hospital room?: A Little  How much help from another person for climbing 3-5 steps with a railing?: A Little  AM-PAC Inpatient Mobility Raw Score : 18  AM-PAC Inpatient T-Scale Score : 43.63  Mobility Inpatient CMS 0-100% Score: 46.58  Mobility Inpatient CMS G-Code Modifier : CK    Nursing cleared patient for PT evaluation. The admitting diagnosis and active problem list as listed above have been reviewed prior to the initiation of this evaluation. OBJECTIVE:   Initial Evaluation  Date: 11/30/2022   Was pt agreeable to Eval/treatment?  Yes   Pain Level  8/10   Right knee    Bed Mobility  Rolling: Supervision     Supine to sit: Supervision     Sit to supine: Supervision     Scooting: Supervision      Transfers Sit to stand: Supervision   from bed, & chair   Ambulation     150 feet using  wheeled walker with Supervision     slow pace   Stair negotiation: ascended and descended   2 x 5 steps  with bilateral rails supervision     ROM Within functional limits except Right knee 10-45°    Strength Within functional limits  except  Right lower extremity 2/5   Balance Sitting EOB:  good    Dynamic Standing:  fair   wheeled walker      Patient is Alert & Oriented x person, place, time, and situation and follows directions    Sensation:  Patient denies numbness/tingling  Edema:  no        Patient education  Patient educated on role of Physical Therapy, risks of immobility, safety and plan of care,  importance of mobility while in hospital , ankle pumps, quad set and glut set for edema control, blood clot prevention, importance and purpose of adaptive device and adjusted to proper height for the patient. , safety , stair training , weight bearing status , and positioning for skin integrity and comfort      Patient response to education:   Pt verbalized understanding Pt demonstrated skill Pt requires further education in this area   Yes Partial Yes       Treatment:  Patient practiced and was instructed in the following treatment:     Therapist educated and facilitated patient on techniques to increase safety and independence with bed mobility, balance, functional transfers, and functional mobility. Patient performed ankle pumps, quad sets, glut sets x 15-20 each. Active assist heelslide, hip abduction/adduction, straight leg raise x 10 each  Assisted to edge of bed,  Sat edge of bed 10 minutes with Independent to increase dynamic sitting balance and activity tolerance. . Ambulated in hallway, performed stairs. Returned to room     Instruction knee extension in bed with kneecap and toes pointing to ceiling  Instruction and performance of incentive inspirometer. At end of session, patient in wheelchair with  call light and phone within reach,   all lines and tubes intact, nursing notified. Patient would benefit from skilled Home Physical Therapy to improve functional independence and quality of life. Patient's/ family goals   home today    Patient and or family understand(s) diagnosis, prognosis, and plan of care.        Time in   600   645  Time out  615  720    Total Treatment Time  30 minutes    Evaluation time includes thorough review of current medical information, gathering information on past medical history/social history and prior level of function, completion of standardized testing/informal observation of tasks, assessment of data, and development of Plan of care and goals.     CPT codes:  Low Complexity PT evaluation (10572)  Therapeutic exercises (75338)   15 minutes  1 unit(s)  Gait Training (03753) 15 minutes 1 unit(s)    Tiarra Simms PT

## 2022-11-30 NOTE — PROGRESS NOTES
1658:  updated multiple times during PACU stay and sent to pharmacy at 1700 due to them closing at 1800.  Pt sent down to Phase 2 of recovery in [facility]

## 2022-11-30 NOTE — H&P
Updated H&P    Chief Complaint   Patient presents with    Knee Pain       Right knee pain follow up. Knee continues to be painful. Feels like it has been getting worse and giving out more often. Really has hard time moving knee at night or in morning. Alexandra Cast returns today for follow-up of her right knee pain. she reports this is worse than when I saw her last.  The patient's pain level is a 6+/10. The previous treatment was not successful. she cannot do steroids due to allergies, insurance denied visco supplementation.  She would like to discuss surgery, she has lost 20 lbs     Past Medical History        Past Medical History:   Diagnosis Date    Anxiety and depression      Arthritis      Asthma      Bulging lumbar disc      Cervical disc disease      Diabetes mellitus ([address]       diet controlled    Ectopic pregnancy      Fasciitis       Right    Fibromyalgia      GERD (gastroesophageal reflux disease)      Heart murmur       benign    Herpes genitalia      Hyperlipidemia      Hypertension      IBS (irritable bowel syndrome)      Migraines      Ovarian cyst      PONV (postoperative nausea and vomiting)       with anesthesia    Stage 3 chronic kidney disease ([address]       follows w Dr Consuelo Crisostomo    Thyroid disease           Past Surgical History         Past Surgical History:   Procedure Laterality Date    CERVICAL FUSION   07/30/2014     anterior cervical discetomy fusion C6-C7    CERVICAL SPINE SURGERY         has a plate    COLONOSCOPY        DILATION AND CURETTAGE OF UTERUS        ECHO COMPL W DOP COLOR FLOW   08/24/2013          ENDOSCOPY, COLON, DIAGNOSTIC        KNEE ARTHROSCOPY Right 2014    KNEE ARTHROSCOPY Right 04/01/2016     Arthroscopy right knee chondroplasty synovectomy medial and lateral menisectomy    KNEE ARTHROSCOPY Left 12/16/2016     left knee arthoscopy medial meniscectomy chodroplasty synovectomy    KNEE ARTHROSCOPY Right 07/10/2020     RIGHT KNEE ARTHROSCOPY, MEDIAL MENISCECTOMY AND DEBRIDEMENT performed by Hector Riggs DO at [facility] ARTHROSCOPY Left 3/11/2022     Left knee arthroscopy and debridement performed by Hector Riggs DO at [facility] Right 5/26/2021     EXCISION SOFT TISSUE NEOPLASM RIGTH LEG performed by Rustam Saunders MD at [address] Bilateral 12/06/2016     lumbar facet #1    OTHER SURGICAL HISTORY         partial rt  fallopian tube removed    [place] SHOULDER SCOPE BONE SHAVING Left 04/12/2018     LEFT SHOULDER ARTHROSCOPY, SUBACROMIAL DECOMPRESSION AND DEBRIDEMENT performed by Hector Riggs DO at [address] ARTHROSCOPY Right 01/03/2020     RIGHT SHOULDER ARTHROSCOPY, SUBACROMIAL DECOMPRESSION AND DEBRIDEMENT LABRIUM performed by Hector Riggs DO at [facility]               Current Medication      Current Outpatient Medications:     Ubrogepant (UBRELVY) 100 MG TABS, Take 100 mg by mouth daily as needed (migraine) Repeat in 2 hours if first dose ineffective; no more than 2 doses in 24 hours, Disp: 48 tablet, Rfl: 3    topiramate (TOPAMAX) 100 MG tablet, Take 100 mg by mouth 2 times daily, Disp: , Rfl:     ondansetron (ZOFRAN) 8 MG tablet, Take 1 tablet by mouth every 8 hours as needed for Nausea or Vomiting, Disp: 30 tablet, Rfl: 0    Multiple Vitamins-Minerals (THERAPEUTIC MULTIVITAMIN-MINERALS) tablet, Take 1 tablet by mouth daily, Disp: , Rfl:     Cetirizine HCl (ZYRTEC PO), Take 10 mg by mouth daily , Disp: , Rfl:     lidocaine (LMX) 4 % cream, , Disp: , Rfl:     metFORMIN (GLUCOPHAGE) 500 MG tablet, Take 500 mg by mouth 2 times daily (with meals) , Disp: , Rfl:     levothyroxine (SYNTHROID) 150 MCG tablet, Take 150 mcg by mouth Daily , Disp: , Rfl:     vitamin D (ERGOCALCIFEROL) 1.25 MG (01648 UT) CAPS capsule, once a week , Disp: , Rfl:     simvastatin (ZOCOR) 5 MG tablet, Take 5 mg by mouth nightly , Disp: , Rfl:     atenolol (TENORMIN) 25 MG tablet, Take 1 tablet by mouth daily (Patient taking differently: Take 25 mg by mouth in the morning and at bedtime Takes 1/2 tab PM), Disp: 30 tablet, Rfl: 0    Cholecalciferol (VITAMIN D3) 50 MCG (2000 UT) CAPS, Take by mouth daily, Disp: , Rfl:     oxyCODONE-acetaminophen (PERCOCET) 5-325 MG per tablet, Take 1 tablet by mouth every 8 hours as needed for Pain., Disp: , Rfl:     albuterol sulfate  (90 Base) MCG/ACT inhaler, Inhale 1 puff into the lungs every 4 hours as needed , Disp: , Rfl: 5    Milnacipran HCl (SAVELLA PO), Take 50 mg by mouth 2 times daily , Disp: , Rfl:     diclofenac sodium 1 % GEL, Apply 2 g topically 4 times daily as needed for Pain Using 4 percent, Disp: , Rfl:     tiZANidine (ZANAFLEX) 4 MG tablet, Take 4 mg by mouth every 8 hours as needed Talking 3 times a day, Disp: , Rfl:     amitriptyline (ELAVIL) 25 MG tablet, Take 25 mg by mouth nightly, Disp: , Rfl:     Elastic Bandages & Supports (KNEE BRACE) MISC, Hinged knee brace to left knee, Disp: 1 each, Rfl: 0    omeprazole (PRILOSEC) 40 MG delayed release capsule, Take 20 mg by mouth in the morning and at bedtime , Disp: , Rfl:     montelukast (SINGULAIR) 10 MG tablet, Take 10 mg by mouth nightly., Disp: , Rfl:     valACYclovir (VALTREX) 500 MG tablet, Take 500 mg by mouth as needed , Disp: , Rfl:            Allergies   Allergen Reactions    Aspirin Anaphylaxis and Nausea Only    Mobic [Meloxicam] Other (See Comments)       \"Almost a stroke, couldn't talk, right side numbness\"    Other         STATES ALL STEROIDS  cause  Migraine,.rash.& difficulty breathing    Cortisone Hives, Nausea And Vomiting and Other (See Comments)        heart rate goes up, massive migraines facial swelling       Darvocet A500 [Propoxyphene N-Acetaminophen] Hives, Nausea And Vomiting and Other (See Comments)       Massive migraines    Folic Acid Hives    Gabapentin Other (See Comments)       Dizziness    Propoxyphene Hives       hives    Tape [facility] Tape] Dermatitis       \"skin spots\"      Social History               Socioeconomic History    Marital status:        Spouse name: Not on file    Number of children: Not on file    Years of education: Not on file    Highest education level: Not on file   Occupational History    Not on file   Tobacco Use    Smoking status: Never    Smokeless tobacco: Never   Vaping Use    Vaping Use: Never used   Substance and Sexual Activity    Alcohol use: No    Drug use: No    Sexual activity: Not on file   Other Topics Concern    Not on file   Social History Narrative    Not on file      Social Determinants of Health      Financial Resource Strain: Not on file   Food Insecurity: Not on file   Transportation Needs: Not on file   Physical Activity: Not on file   Stress: Not on file   Social Connections: Not on file   Intimate Partner Violence: Not on file   Housing Stability: Not on file         Family History         Family History   Problem Relation Age of Onset    Diabetes Mother      Migraines Mother      Hypertension Mother      Bipolar Disorder Mother      Ovarian Cancer Sister      Bipolar Disorder Sister      Depression Sister      Mental Illness Other      Arthritis Other      Hypertension Other      No Known Problems Father              Review of Systems:      Skin: (-) rash,(-) psoriasis,(-) eczema, (-)skin cancer. Musculoskeletal: (-) fractures,  (-) dislocations,(-) collagen vascular disease, (-) fibromyalgia, (-) multiple sclerosis, (-) muscular dystrophy, (-) RSD,(-) joint pain (-)swelling, (-) joint pain,swelling. Neurologic: (-) epilepsy, (-)seizures,(-) brain tumor,(-) TIA, (-)stroke, (-)headaches, (-)Parkinson disease,(-) memory loss, (-) LOC. Cardiovascular: (-) Chest pain, (-) swelling in legs/feet, (-) SOB, (-) cramping in legs/feet with walking. Constitutional:    Vital signs are stable. In general, patient is awake, alert and oriented X3, in no apparent distress.   Examination of HENT reveals normocephalic, atraumatic. PERRLA/EOMI sclera are white. Conjunctivae are clear. TM's are intact. Pharynx is pink and moist.  Uvula and tongue are midline. Heart: Positive S1 and positive S2 with regular rate and rhythm. Lungs: Clear to auscultation bilaterally without rales, rhonchi or wheezes. Abdomen: soft, nontender. Positive bowel sounds. No organomegaly. No guarding or rigidity. The patient is alert and oriented x 3, appears to be stated age and in no distress. BP (!) 140/93   Pulse 88   Temp 98.3 °F (36.8 °C) (Infrared)   Resp 16   LMP 11/18/2022 (Exact Date)   SpO2 99%        Skin:  Upon inspection: the skin appears warm, dry and intact. There is not a previous scar over the affected area. There is not any cellulitis, lymphedema or cutaneous lesions noted in the lower extremities. Upon palpation there is no induration noted. Neurologic:  Gait: normal;  Motor exam of the lower extremities show ; quadriceps, hamstrings, foot dorsi and plantar flexors intact R.  5/5 and L. 5/5. Deep tendon reflexes are 2/4 at the knees and 2/4 at the ankles with strong extensor hallicus longus motor strength bilaterally. Sensory to both feet is intact to all sensory roots. Cardiovascular: The vascular exam is normal and is well perfused to distal extremities. Distal pulses DP/PT: R. 2+; L. 2+. There is cap refill noted less than two seconds in all digits. There is not edema of the bilateral lower extremities. There is not varicosities noted in the distal extremities. Lymph:  Upon palpation,  there is no lymphadenopathy noted in bilateral lower extremities. Musculoskeletal:  Gait: antalgic; examination of the nails and digits reveal no cyanosis or clubbing     Lumbar exam:  On visual inspection, there is no deformity of the spine. full range of motion, no tenderness, palpable spasm or pain on motion. Special tests: Straight Leg Raise negative, Omer testnegative.      Hip exam:  Upon inspection, there is no deformity noted. Upon palpation there is not tenderness. ROM: is   full and semetrical.   Strength: Hip Flexors 5/5; Hip Abductors 5/5; Hip Adduction 5/5. Knee exam:  Right knee exam shows;  range of motion of R. Knee is 0 to 120, and L. Knee is 0 to 120. She does have  pain on motion, effusion is mild, there is tenderness over the  medial, anterior region, there are not any masses, there is not ligamentous instability, there is  deformity noted. Knee exam: bilateral positive for moderate crepitations, some mild tenderness laxity is not noted with  stress. R. Knee:  Lachman's negative, Anterior Drawer negative, Posterior Drawer negative  Miranda's positive, Thallasy  positive,   PF grind test negative, Apprehension test negative, Patellar J sign  negative  L. Knee:  Lachman's negative, Anterior Drawer negative, Posterior Drawer negative  Miranda's negative, Thallasy  negative,   PF grind test negative, Apprehension test negative,  Patellar J sign  negative        MRI:   Impression   No meniscus or ligament pathology. Mild degenerative changes to the patellofemoral compartment of the knee, new   from prior remote MRI 04/01/2014. Radiographic findings reviewed with patient     Impression:       Encounter Diagnosis   Name Primary? Primary osteoarthritis of right knee Yes               Plan:   Natural history and expected course discussed. Questions answered. Educational materials distributed. Rest, ice, compression, and elevation (RICE) therapy. Reduction in offending activity. I had a lengthy discussion with the patient regarding their diagnosis. I explained treatment options including surgical vs non surgical treatment. I reviewed in detail the risks and benefits and outlined the procedure in detail with expected outcomes and possible complications.   I also discussed non surgical treatment such as injections (CSI and visco supplementation), physical therapy, topical creams and NSAID's. They have elected for surgical  management at this time. We discussed various treatment options both surgical and non-surgical.   The patient is unable to ambulate more than 100 feet and is unable to perform the average daily activities including:  Light housework, ADLs, donning clothes, toileting and exercise. Patient has failed previous conservative measures including cortisone injections, NSAIDs, PT, HEP and pain medication and is currently a fall risk to the disability and decreased functioning. The patient wishes to have the total knee arthroplasty. The risks and benefits of a total knee replacement were discussed with the patient. The risks include but are not limited to: infection, injury to blood vessels and nerves, non relief of symptoms, arthrofibrosis of knee, aseptic loosening of prosthesis, intraoperative fracture, blood loss, PE/DVT, MI, dislocation of hip and knee, need for further operative intervention and death. The patient is aware of the risks and wished to proceed with a Right patellofemoral vs total knee replacement on 11/30/22.

## 2022-12-01 NOTE — ANESTHESIA POSTPROCEDURE EVALUATION
Department of Anesthesiology  Postprocedure Note    Patient: Calos Guadalupe  MRN: [id number]  YOB: 1980  Date of evaluation: 12/1/2022      Procedure Summary     Date: 11/30/22 Room / Location: [address] [place] [address] / [facility]    Anesthesia Start: 1304 Anesthesia Stop: 1514    Procedure: RIGHT KNEE PATELLOFEMORAL ARTHROPLASTY VS TOTAL KNEE ARTHROPLASTY- 11/30/22 ARTHROSURFACE AND JAVED AND NEPHEW (Right: Knee) Diagnosis:       Osteoarthritis of right knee      (Osteoarthritis of right knee [M17.11])    Surgeons: Lauri Garrison DO Responsible Provider: Kaylie Jorgensen MD    Anesthesia Type: general ASA Status: 3          Anesthesia Type: No value filed.     Peter Phase I: Peter Score: 10    Peter Phase II: Peter Score: 10      Anesthesia Post Evaluation    Patient location during evaluation: PACU  Patient participation: complete - patient participated  Level of consciousness: awake  Pain score: 0  Airway patency: patent  Nausea & Vomiting: no nausea  Complications: no  Cardiovascular status: blood pressure returned to baseline  Respiratory status: acceptable  Hydration status: euvolemic

## 2022-12-13 DIAGNOSIS — M17.11 PRIMARY OSTEOARTHRITIS OF RIGHT KNEE: Primary | ICD-10-CM

## 2022-12-15 ENCOUNTER — OFFICE VISIT (OUTPATIENT)
Dept: ORTHOPEDIC SURGERY | Age: 42
End: 2022-12-15

## 2022-12-15 VITALS — WEIGHT: 219 LBS | BODY MASS INDEX: 38.8 KG/M2 | TEMPERATURE: 98 F | HEIGHT: 63 IN

## 2022-12-15 DIAGNOSIS — M17.11 PRIMARY OSTEOARTHRITIS OF RIGHT KNEE: Primary | ICD-10-CM

## 2022-12-15 PROCEDURE — 99024 POSTOP FOLLOW-UP VISIT: CPT | Performed by: ORTHOPAEDIC SURGERY

## 2022-12-15 NOTE — PROGRESS NOTES
Post-Operative week: 2 Status Post right knee patellofemoral replacement, surgery date 11/30/2022. Systemic or Specific Complaints:No Complaints    Objective:     General: alert, appears stated age, and cooperative   Wound: Wound clean and dry no evidence of infection. , No Erythema, No Edema, and No Drainage   Motion: Flexion: 0 to 90 Degrees   DVT Exam: No evidence of DVT seen on physical exam.  Negative Rosendo's sign. No cords or calf tenderness. Xrays:  No signs of fracture, there is good alignment, and no signs of aseptic loosening. Radiographic findings reviewed with patient    Assessment:     Encounter Diagnosis   Name Primary? Primary osteoarthritis of right knee Yes      Doing well postoperatively. Plan:     Continues current post-op course  Continues current post-op course, staples were removed at today's appt  Patient is to continue taking enteric coated aspirin 81 mg, 1 tablet twice daily. Patient is to continue wearing FRANCOIS hose until next follow up visit but wear during the day and remove at night. Outpatient physical therapy is to begin immediately. No bathing/swimming/hot tub for two weeks or until incision is completely closed. We will see the patient back in 3 weeks for repeat xray and evaluation.

## 2023-01-06 ENCOUNTER — OFFICE VISIT (OUTPATIENT)
Dept: ORTHOPEDIC SURGERY | Age: 43
End: 2023-01-06

## 2023-01-06 VITALS — TEMPERATURE: 98 F | WEIGHT: 219 LBS | BODY MASS INDEX: 38.8 KG/M2 | HEIGHT: 63 IN

## 2023-01-06 DIAGNOSIS — Z96.651 HISTORY OF ARTHROPLASTY OF RIGHT KNEE: Primary | ICD-10-CM

## 2023-01-06 PROCEDURE — 99024 POSTOP FOLLOW-UP VISIT: CPT | Performed by: NURSE PRACTITIONER

## 2023-01-06 NOTE — PROGRESS NOTES
Post-Operative week: 6 Status Post right PF Arthroplasty, DOS: 11/30/22  Systemic or Specific Complaints:No Complaints    Objective:     General: alert, appears stated age and cooperative   Wound: Wound clean and dry no evidence of infection. , No Erythema, No Edema and No Drainage   Motion: Flexion: 0 to 125 Degrees   DVT Exam: No evidence of DVT seen on physical exam.  Negative Rosendo's sign. No cords or calf tenderness. No significant calf/ankle edema. Xrays:  N/A    Assessment:     Encounter Diagnosis   Name Primary? History of arthroplasty of right knee Yes      Doing well postoperatively. Plan:     Continues current post-op course  Patient is to discontinue wearing FRANCOIS hose. Continue with outpatient physical therapy.     Follow up 2 months with xr

## 2023-01-09 ENCOUNTER — OFFICE VISIT (OUTPATIENT)
Dept: NEUROLOGY | Age: 43
End: 2023-01-09
Payer: COMMERCIAL

## 2023-01-09 VITALS
WEIGHT: 219 LBS | HEART RATE: 86 BPM | RESPIRATION RATE: 16 BRPM | BODY MASS INDEX: 38.8 KG/M2 | SYSTOLIC BLOOD PRESSURE: 115 MMHG | TEMPERATURE: 98.3 F | DIASTOLIC BLOOD PRESSURE: 76 MMHG | HEIGHT: 63 IN | OXYGEN SATURATION: 100 %

## 2023-01-09 DIAGNOSIS — G43.709 CHRONIC MIGRAINE WITHOUT AURA WITHOUT STATUS MIGRAINOSUS, NOT INTRACTABLE: Primary | ICD-10-CM

## 2023-01-09 PROCEDURE — 64615 CHEMODENERV MUSC MIGRAINE: CPT | Performed by: NURSE PRACTITIONER

## 2023-01-09 NOTE — PROGRESS NOTES
Botulinum Toxin Injection Procedure Note    Pre-procedure Diagnosis: Chronic migraine    Indications: same    Procedure Details   The risks, benefits, indications, potential complications, and alternatives were explained to the patient and informed consent obtained. The following 31 injection sites were injected with botulinum toxin:    Bilateral Corrugators: 5 units each side  Procerus: 5 units  Bilateral frontalis: 10 units each side  Bilateral temporalis: 20 units each side  Bilateral occipitalis: 15 units each side  Bilateral cervical paraspinals: 10 units each side  Bilateral trapezius: 15 units each side    Total: 155 units      Botulinum toxin Lot #: [id number]  Botulinum toxin expiration date: 3/2025  Total botox units injected: 155 units  Total botox units wasted: 45 units    The patient tolerated the procedure well. Plan:    Call with any facial muscle weakness, ptosis, neck weakness, difficulty swallowing, difficulty speaking, or diffculty breathing    Keep a careful HA diary    Return to clinic for repeat botulinum injection in 3 months.      Sharad Ya APRN - CNP-AQH  9:26 AM

## 2023-01-30 ENCOUNTER — HOSPITAL ENCOUNTER (OUTPATIENT)
Dept: INFUSION THERAPY | Age: 43
Discharge: HOME | End: 2023-01-30
Payer: COMMERCIAL

## 2023-01-30 DIAGNOSIS — D64.9 ANEMIA, UNSPECIFIED TYPE: ICD-10-CM

## 2023-01-30 LAB
ALBUMIN SERPL-MCNC: 4.2 G/DL (ref 3.5–5.2)
ALP BLD-CCNC: 89 U/L (ref 35–104)
ALT SERPL-CCNC: 19 U/L (ref 0–32)
ANION GAP SERPL CALCULATED.3IONS-SCNC: 9 MMOL/L (ref 7–16)
AST SERPL-CCNC: 18 U/L (ref 0–31)
BASOPHILS ABSOLUTE: 0.04 E9/L (ref 0–0.2)
BASOPHILS RELATIVE PERCENT: 0.8 % (ref 0–2)
BILIRUB SERPL-MCNC: <0.2 MG/DL (ref 0–1.2)
BUN BLDV-MCNC: 16 MG/DL (ref 6–20)
CALCIUM SERPL-MCNC: 9.2 MG/DL (ref 8.6–10.2)
CHLORIDE BLD-SCNC: 106 MMOL/L (ref 98–107)
CO2: 25 MMOL/L (ref 22–29)
CREAT SERPL-MCNC: 1 MG/DL (ref 0.5–1)
EOSINOPHILS ABSOLUTE: 0.16 E9/L (ref 0.05–0.5)
EOSINOPHILS RELATIVE PERCENT: 3.2 % (ref 0–6)
FERRITIN: 68 NG/ML
FOLATE: 20 NG/ML (ref 4.8–24.2)
GFR SERPL CREATININE-BSD FRML MDRD: >60 ML/MIN/1.73
GLUCOSE BLD-MCNC: 95 MG/DL (ref 74–99)
HCT VFR BLD CALC: 43.6 % (ref 34–48)
HEMOGLOBIN: 14.4 G/DL (ref 11.5–15.5)
IMMATURE GRANULOCYTES #: 0.01 E9/L
IMMATURE GRANULOCYTES %: 0.2 % (ref 0–5)
IRON SATURATION: 30 % (ref 15–50)
IRON: 82 MCG/DL (ref 37–145)
LYMPHOCYTES ABSOLUTE: 1.33 E9/L (ref 1.5–4)
LYMPHOCYTES RELATIVE PERCENT: 26.6 % (ref 20–42)
MCH RBC QN AUTO: 32.6 PG (ref 26–35)
MCHC RBC AUTO-ENTMCNC: 33 % (ref 32–34.5)
MCV RBC AUTO: 98.6 FL (ref 80–99.9)
MONOCYTES ABSOLUTE: 0.27 E9/L (ref 0.1–0.95)
MONOCYTES RELATIVE PERCENT: 5.4 % (ref 2–12)
NEUTROPHILS ABSOLUTE: 3.19 E9/L (ref 1.8–7.3)
NEUTROPHILS RELATIVE PERCENT: 63.8 % (ref 43–80)
PDW BLD-RTO: 11.7 FL (ref 11.5–15)
PLATELET # BLD: 211 E9/L (ref 130–450)
PMV BLD AUTO: 11.6 FL (ref 7–12)
POTASSIUM SERPL-SCNC: 4.3 MMOL/L (ref 3.5–5)
RBC # BLD: 4.42 E12/L (ref 3.5–5.5)
SODIUM BLD-SCNC: 140 MMOL/L (ref 132–146)
TOTAL IRON BINDING CAPACITY: 275 MCG/DL (ref 250–450)
TOTAL PROTEIN: 7.2 G/DL (ref 6.4–8.3)
VITAMIN B-12: 869 PG/ML (ref 211–946)
WBC # BLD: 5 E9/L (ref 4.5–11.5)

## 2023-01-30 PROCEDURE — 82746 ASSAY OF FOLIC ACID SERUM: CPT

## 2023-01-30 PROCEDURE — 83540 ASSAY OF IRON: CPT

## 2023-01-30 PROCEDURE — 82728 ASSAY OF FERRITIN: CPT

## 2023-01-30 PROCEDURE — 80053 COMPREHEN METABOLIC PANEL: CPT

## 2023-01-30 PROCEDURE — 36415 COLL VENOUS BLD VENIPUNCTURE: CPT

## 2023-01-30 PROCEDURE — 85025 COMPLETE CBC W/AUTO DIFF WBC: CPT

## 2023-01-30 PROCEDURE — 83550 IRON BINDING TEST: CPT

## 2023-01-30 PROCEDURE — 82607 VITAMIN B-12: CPT

## 2023-03-02 DIAGNOSIS — Z96.651 HISTORY OF ARTHROPLASTY OF RIGHT KNEE: Primary | ICD-10-CM

## 2023-03-06 ENCOUNTER — OFFICE VISIT (OUTPATIENT)
Dept: ORTHOPEDIC SURGERY | Age: 43
End: 2023-03-06
Payer: COMMERCIAL

## 2023-03-06 VITALS — WEIGHT: 225 LBS | HEIGHT: 63 IN | TEMPERATURE: 98 F | BODY MASS INDEX: 39.87 KG/M2

## 2023-03-06 DIAGNOSIS — Z96.651 HISTORY OF ARTHROPLASTY OF RIGHT KNEE: Primary | ICD-10-CM

## 2023-03-06 PROCEDURE — G8484 FLU IMMUNIZE NO ADMIN: HCPCS | Performed by: ORTHOPAEDIC SURGERY

## 2023-03-06 PROCEDURE — 99213 OFFICE O/P EST LOW 20 MIN: CPT | Performed by: ORTHOPAEDIC SURGERY

## 2023-03-06 PROCEDURE — G8427 DOCREV CUR MEDS BY ELIG CLIN: HCPCS | Performed by: ORTHOPAEDIC SURGERY

## 2023-03-06 PROCEDURE — G8417 CALC BMI ABV UP PARAM F/U: HCPCS | Performed by: ORTHOPAEDIC SURGERY

## 2023-03-06 PROCEDURE — 1036F TOBACCO NON-USER: CPT | Performed by: ORTHOPAEDIC SURGERY

## 2023-03-06 NOTE — PROGRESS NOTES
Chief Complaint   Patient presents with    Knee Pain     Right Knee PF Arthroplasty DOS 11/30/2022,  states of swelling that comes and goes. Noe Schroeder returns today for follow-up of her right knee pain. she reports this is better than when I saw her last.  The patient's pain level is a 4-5/10. The previous treatment was successful.     Past Medical History:   Diagnosis Date    Anxiety and depression     Arthritis     Asthma     Bulging lumbar disc     Cervical disc disease     Diabetes mellitus ([address]     diet controlled    Ectopic pregnancy     Fasciitis     Right    Fibromyalgia     GERD (gastroesophageal reflux disease)     Heart murmur     benign    Herpes genitalia     History of blood transfusion     Hyperlipidemia     Hypertension     IBS (irritable bowel syndrome)     Migraines     Ovarian cyst     PONV (postoperative nausea and vomiting)     with anesthesia    Stage 3 chronic kidney disease ([address]     follows w Dr Tabitha Balderas    Thyroid disease      Past Surgical History:   Procedure Laterality Date    CERVICAL FUSION  07/30/2014    anterior cervical discetomy fusion C6-C7    CERVICAL SPINE SURGERY      has a plate    COLONOSCOPY      DILATION AND CURETTAGE OF UTERUS      ECHO COMPL W DOP COLOR FLOW  08/24/2013         ENDOSCOPY, COLON, DIAGNOSTIC      KNEE ARTHROSCOPY Right 2014    KNEE ARTHROSCOPY Right 04/01/2016    Arthroscopy right knee chondroplasty synovectomy medial and lateral menisectomy    KNEE ARTHROSCOPY Left 12/16/2016    left knee arthoscopy medial meniscectomy chodroplasty synovectomy    KNEE ARTHROSCOPY Right 07/10/2020    RIGHT KNEE ARTHROSCOPY, MEDIAL MENISCECTOMY AND DEBRIDEMENT performed by Dinorah Ortiz DO at [facility] ARTHROSCOPY Left 3/11/2022    Left knee arthroscopy and debridement performed by Dinorah Ortiz DO at [facility] Right 5/26/2021    EXCISION SOFT TISSUE NEOPLASM RIGTH LEG performed by Estrella Bejarano MD at [address] NERVE BLOCK Bilateral 12/06/2016    lumbar facet #1    OTHER SURGICAL HISTORY      partial rt  fallopian tube removed    DC SURGICAL ARTHROSCOPY JUMA W/CORACOACRM LIGM RLS Left 04/12/2018    LEFT SHOULDER ARTHROSCOPY, SUBACROMIAL DECOMPRESSION AND DEBRIDEMENT performed by Indra Reyna DO at [address] ARTHROSCOPY Right 01/03/2020    RIGHT SHOULDER ARTHROSCOPY, SUBACROMIAL DECOMPRESSION AND DEBRIDEMENT LABRIUM performed by Indra Reyna DO at [address] [facility] Right 11/30/2022    RIGHT KNEE PATELLOFEMORAL ARTHROPLASTY VS TOTAL KNEE ARTHROPLASTY- 11/30/22 ARTHROSURFACE AND SMITH AND NEPHEW performed by Indra Reyna DO at [address] [facility]         Current Outpatient Medications:     cetirizine (ZYRTEC) 10 MG tablet, TAKE 1 TABLET BY ORAL ROUTE EVERY DAY, Disp: , Rfl:     Ubrogepant (UBRELVY) 100 MG TABS, Take 100 mg by mouth daily as needed (migraine) Repeat in 2 hours if first dose ineffective; no more than 2 doses in 24 hours, Disp: 48 tablet, Rfl: 3    topiramate (TOPAMAX) 100 MG tablet, Take 100 mg by mouth 2 times daily, Disp: , Rfl:     ondansetron (ZOFRAN) 8 MG tablet, Take 1 tablet by mouth every 8 hours as needed for Nausea or Vomiting, Disp: 30 tablet, Rfl: 0    lidocaine (LMX) 4 % cream, , Disp: , Rfl:     metFORMIN (GLUCOPHAGE) 500 MG tablet, Take 500 mg by mouth 2 times daily (with meals) , Disp: , Rfl:     levothyroxine (SYNTHROID) 150 MCG tablet, Take 150 mcg by mouth Daily , Disp: , Rfl:     vitamin D (ERGOCALCIFEROL) 1.25 MG (75031 UT) CAPS capsule, once a week , Disp: , Rfl:     simvastatin (ZOCOR) 5 MG tablet, Take 5 mg by mouth nightly , Disp: , Rfl:     atenolol (TENORMIN) 25 MG tablet, Take 1 tablet by mouth daily, Disp: 30 tablet, Rfl: 0    Cholecalciferol (VITAMIN D3) 50 MCG (2000 UT) CAPS, Take by mouth daily, Disp: , Rfl:     albuterol sulfate  (90 Base) MCG/ACT inhaler, Inhale 1 puff into the lungs every 4 hours as needed , Disp: , Rfl: 5    Milnacipran HCl (SAVELLA PO), Take 50 mg by mouth 2 times daily , Disp: , Rfl:     diclofenac sodium 1 % GEL, Apply 2 g topically 4 times daily as needed for Pain Using 4 percent, Disp: , Rfl:     tiZANidine (ZANAFLEX) 4 MG tablet, Take 4 mg by mouth every 8 hours as needed Talking 3 times a day, Disp: , Rfl:     amitriptyline (ELAVIL) 25 MG tablet, Take 25 mg by mouth nightly, Disp: , Rfl:     Elastic Bandages & Supports (KNEE BRACE) MISC, Hinged knee brace to left knee, Disp: 1 each, Rfl: 0    omeprazole (PRILOSEC) 40 MG delayed release capsule, Take 20 mg by mouth in the morning and at bedtime , Disp: , Rfl:     montelukast (SINGULAIR) 10 MG tablet, Take 10 mg by mouth nightly., Disp: , Rfl:     valACYclovir (VALTREX) 500 MG tablet, Take 500 mg by mouth as needed , Disp: , Rfl:   Allergies   Allergen Reactions    Aspirin Anaphylaxis, Nausea Only and Other (See Comments)     Other reaction(s):  Other (See Comments)  Heart rate goes up    Mobic [Meloxicam] Other (See Comments)     \"Almost a stroke, couldn't talk, right side numbness\"    Other      STATES ALL STEROIDS  cause  Migraine,.rash.& difficulty breathing    Prednisone Other (See Comments)    Cortisone Hives, Nausea And Vomiting, Other (See Comments) and Rash      heart rate goes up, massive migraines facial swelling      Darvocet A500 [Propoxyphene N-Acetaminophen] Hives, Nausea And Vomiting and Other (See Comments)     Massive migraines    Folic Acid Hives    Gabapentin Other (See Comments)     Dizziness    Propoxyphene Hives and Other (See Comments)     hives    Tape [Adhesive Tape] Dermatitis     \"skin spots\"     Social History     Socioeconomic History    Marital status:      Spouse name: Not on file    Number of children: Not on file    Years of education: Not on file    Highest education level: Not on file   Occupational History    Not on file   Tobacco Use    Smoking status: Never    Smokeless tobacco: Never   Vaping Use    Vaping Use: Never used   Substance and Sexual Activity    Alcohol use: No    Drug use: No    Sexual activity: Not on file   Other Topics Concern    Not on file   Social History Narrative    Not on file     Social Determinants of Health     Financial Resource Strain: Not on file   Food Insecurity: Not on file   Transportation Needs: Not on file   Physical Activity: Not on file   Stress: Not on file   Social Connections: Not on file   Intimate Partner Violence: Not on file   Housing Stability: Not on file     Family History   Problem Relation Age of Onset    Diabetes Mother     Migraines Mother     Hypertension Mother     Bipolar Disorder Mother     Ovarian Cancer Sister     Bipolar Disorder Sister     Depression Sister     Mental Illness Other     Arthritis Other     Hypertension Other     No Known Problems Father        Review of Systems:     Skin: (-) rash,(-) psoriasis,(-) eczema, (-)skin cancer. Musculoskeletal: (-) fractures,  (-) dislocations,(-) collagen vascular disease, (-) fibromyalgia, (-) multiple sclerosis, (-) muscular dystrophy, (-) RSD,(-) joint pain (-)swelling, (-) joint pain,swelling. Neurologic: (-) epilepsy, (-)seizures,(-) brain tumor,(-) TIA, (-)stroke, (-)headaches, (-)Parkinson disease,(-) memory loss, (-) LOC. Cardiovascular: (-) Chest pain, (-) swelling in legs/feet, (-) SOB, (-) cramping in legs/feet with walking. Constitutional:  The patient is alert and oriented x 3, appears to be stated age and in no distress. Temp 98 °F (36.7 °C)   Ht 5' 3\" (1.6 m)   Wt 225 lb (102.1 kg)   BMI 39.86 kg/m²     Skin:  Upon inspection: the skin appears warm, dry and intact. There is not a previous scar over the affected area. There is not any cellulitis, lymphedema or cutaneous lesions noted in the lower extremities. Upon palpation there is no induration noted.       Neurologic:  Gait: normal;  Motor exam of the lower extremities show ; quadriceps, hamstrings, foot dorsi and plantar flexors intact R.  5/5 and L. 5/5. Deep tendon reflexes are 2/4 at the knees and 2/4 at the ankles with strong extensor hallicus longus motor strength bilaterally. Sensory to both feet is intact to all sensory roots. Cardiovascular: The vascular exam is normal and is well perfused to distal extremities. Distal pulses DP/PT: R. 2+; L. 2+. There is cap refill noted less than two seconds in all digits. There is not edema of the bilateral lower extremities. There is not varicosities noted in the distal extremities. Lymph:  Upon palpation,  there is no lymphadenopathy noted in bilateral lower extremities. Musculoskeletal:  Gait: normal; examination of the nails and digits reveal no cyanosis or clubbing    Lumbar exam:  On visual inspection, there is no deformity of the spine. full range of motion, no tenderness, palpable spasm or pain on motion. Special tests: Straight Leg Raise negative, Omer testnegative. Hip exam:  Upon inspection, there is no deformity noted. Upon palpation there is not tenderness. ROM: is   full and semetrical.   Strength: Hip Flexors 5/5; Hip Abductors 5/5; Hip Adduction 5/5. Knee exam:  Right knee exam shows;  range of motion of R. Knee is 0 to 120, and L. Knee is 0 to 120. She does not have  pain on motion, effusion is mild, there is not tenderness over the  global region, there are not any masses, there is not ligamentous instability, there is not  deformity noted. Knee exam: bilateral positive for moderate crepitations, some mild tenderness laxity is not noted with  stress.       R. Knee:  Lachman's negative, Anterior Drawer negative, Posterior Drawer negative  Miranda's negative, Thallasy  negative,   PF grind test negative, Apprehension test negative, Patellar J sign  negative  L. Knee:  Lachman's negative, Anterior Drawer negative, Posterior Drawer negative  Miranda's negative, Thallasy  negative,   PF grind test negative, Apprehension test negative, Patellar J sign  negative    Xrays:   Unicompartmental arthroplasty with tiny knee joint effusion. MRI:   Not performed. Radiographic findings reviewed with patient    Impression:  Encounter Diagnosis   Name Primary? History of arthroplasty of right knee Yes       Plan:   Natural history and expected course discussed. Questions answered. Educational materials distributed. Rest, ice, compression, and elevation (RICE) therapy. Reduction in offending activity. Patellar compression sleeve. Patient is doing well and has returned to all activities. She will continue to perform exercies.     Fu 3months with xray

## 2023-03-15 ENCOUNTER — HOSPITAL ENCOUNTER (EMERGENCY)
Age: 43
Discharge: HOME | End: 2023-03-15
Attending: EMERGENCY MEDICINE
Payer: COMMERCIAL

## 2023-03-15 VITALS
BODY MASS INDEX: 39.87 KG/M2 | WEIGHT: 225 LBS | HEART RATE: 76 BPM | HEIGHT: 63 IN | DIASTOLIC BLOOD PRESSURE: 96 MMHG | SYSTOLIC BLOOD PRESSURE: 133 MMHG | OXYGEN SATURATION: 97 % | RESPIRATION RATE: 16 BRPM | TEMPERATURE: 97 F

## 2023-03-15 DIAGNOSIS — N94.89 LABIAL SWELLING: Primary | ICD-10-CM

## 2023-03-15 PROCEDURE — 99283 EMERGENCY DEPT VISIT LOW MDM: CPT

## 2023-03-15 RX ORDER — DOXYCYCLINE HYCLATE 100 MG
100 TABLET ORAL 2 TIMES DAILY
Qty: 20 TABLET | Refills: 0 | Status: SHIPPED | OUTPATIENT
Start: 2023-03-15 | End: 2023-03-25

## 2023-03-15 ASSESSMENT — PAIN DESCRIPTION - ONSET: ONSET: ON-GOING

## 2023-03-15 ASSESSMENT — PAIN DESCRIPTION - DESCRIPTORS: DESCRIPTORS: ACHING;TENDER;SORE

## 2023-03-15 ASSESSMENT — PAIN SCALES - GENERAL: PAINLEVEL_OUTOF10: 6

## 2023-03-15 ASSESSMENT — PAIN DESCRIPTION - FREQUENCY: FREQUENCY: CONTINUOUS

## 2023-03-15 ASSESSMENT — PAIN - FUNCTIONAL ASSESSMENT: PAIN_FUNCTIONAL_ASSESSMENT: 0-10

## 2023-03-15 ASSESSMENT — PAIN DESCRIPTION - ORIENTATION: ORIENTATION: LEFT

## 2023-03-15 ASSESSMENT — PAIN DESCRIPTION - LOCATION: LOCATION: OTHER (COMMENT)

## 2023-03-15 ASSESSMENT — PAIN DESCRIPTION - PAIN TYPE: TYPE: ACUTE PAIN

## 2023-03-15 NOTE — ED PROVIDER NOTES
HPI:  3/15/23,   Time: 4:43 PM EDT         Nehemias Clayton is a 43 y.o. female presenting to the ED for complaint: Redness and swelling to the left labia/took Valtrex today without relief, beginning 1 day ago. The complaint has been persistent, moderate in severity, and worsened by nothing. ROS:   Pertinent positives and negatives are stated within HPI, all other systems reviewed and are negative.  --------------------------------------------- PAST HISTORY ---------------------------------------------  Past Medical History:  has a past medical history of Anxiety and depression, Arthritis, Asthma, Bulging lumbar disc, Cervical disc disease, Diabetes mellitus ([address] Ectopic pregnancy, Fasciitis, Fibromyalgia, GERD (gastroesophageal reflux disease), Heart murmur, Herpes genitalia, History of blood transfusion, Hyperlipidemia, Hypertension, IBS (irritable bowel syndrome), Migraines, Ovarian cyst, PONV (postoperative nausea and vomiting), Stage 3 chronic kidney disease ([address] and Thyroid disease. Past Surgical History:  has a past surgical history that includes Dilation and curettage of uterus; Tubal ligation; Tonsillectomy; ECHO Compl W Dop Color Flow (08/24/2013); other surgical history; cervical fusion (07/30/2014); Knee arthroscopy (Right, 2014); Knee arthroscopy (Right, 04/01/2016); Nerve Block (Bilateral, 12/06/2016); Knee arthroscopy (Left, 12/16/2016); pr surgical arthroscopy antionette w/coracoacrm ligm rls (Left, 04/12/2018); Cervical spine surgery; Shoulder arthroscopy (Right, 01/03/2020); Knee arthroscopy (Right, 07/10/2020); Colonoscopy; Endoscopy, colon, diagnostic; Leg biopsy excision (Right, 5/26/2021); Knee arthroscopy (Left, 3/11/2022); and Total knee arthroplasty (Right, 11/30/2022). Social History:  reports that she has never smoked. She has never used smokeless tobacco. She reports that she does not drink alcohol and does not use drugs.     Family History: family history includes Arthritis in an other family member; Bipolar Disorder in her mother and sister; Depression in her sister; Diabetes in her mother; Hypertension in her mother and another family member; Mental Illness in an other family member; Migraines in her mother; No Known Problems in her father; Ovarian Cancer in her sister. The patients home medications have been reviewed. Allergies: Aspirin, Mobic [meloxicam], Other, Prednisone, Cortisone, Darvocet a500 [propoxyphene n-acetaminophen], Folic acid, Gabapentin, Propoxyphene, and Tape [adhesive tape]    -------------------------------------------------- RESULTS -------------------------------------------------  All laboratory and radiology results have been personally reviewed by myself   LABS:  No results found for this visit on 03/15/23. RADIOLOGY:  Interpreted by Radiologist.  No orders to display       ------------------------- NURSING NOTES AND VITALS REVIEWED ---------------------------   The nursing notes within the ED encounter and vital signs as below have been reviewed. BP (!) 133/96   Pulse 76   Temp 97 °F (36.1 °C) (Temporal)   Resp 16   Ht 5' 3\" (1.6 m)   Wt 225 lb (102.1 kg)   LMP 02/25/2023   SpO2 97%   BMI 39.86 kg/m²   Oxygen Saturation Interpretation: Normal      ---------------------------------------------------PHYSICAL EXAM--------------------------------------      Constitutional/General: Alert and oriented x3, well appearing, non toxic in NAD  Head: NC/AT  Eyes: PERRL, EOMI  Mouth: Oropharynx clear, handling secretions, no trismus  Neck: Supple, full ROM, no meningeal signs  Pulmonary: Lungs clear to auscultation bilaterally, no wheezes, rales, or rhonchi. Not in respiratory distress  Cardiovascular:  Regular rate and rhythm, no murmurs, gallops, or rubs. 2+ distal pulses  Abdomen: Soft, non tender, non distended,   Pelvic    exam: Mildly erythematous and swollen labia on the left side  Extremities: Moves all extremities x 4.  Warm and well perfused  Skin: warm and dry without rash  Neurologic: GCS 15,  Psych: Normal Affect      ------------------------------ ED COURSE/MEDICAL DECISION MAKING----------------------  Medications - No data to display      Medical Decision Making:    Patient advised to follow-up with OB/GYN. Patient's Medications   New Prescriptions    DOXYCYCLINE HYCLATE (VIBRA-TABS) 100 MG TABLET    Take 1 tablet by mouth 2 times daily for 10 days   Previous Medications    ALBUTEROL SULFATE  (90 BASE) MCG/ACT INHALER    Inhale 1 puff into the lungs every 4 hours as needed     AMITRIPTYLINE (ELAVIL) 25 MG TABLET    Take 25 mg by mouth nightly    ATENOLOL (TENORMIN) 25 MG TABLET    Take 1 tablet by mouth daily    CETIRIZINE (ZYRTEC) 10 MG TABLET    TAKE 1 TABLET BY ORAL ROUTE EVERY DAY    DICLOFENAC SODIUM 1 % GEL    Apply 2 g topically 4 times daily as needed for Pain Using 4 percent    ELASTIC BANDAGES & SUPPORTS (KNEE BRACE) MISC    Hinged knee brace to left knee    LEVOTHYROXINE (SYNTHROID) 150 MCG TABLET    Take 150 mcg by mouth Daily     LIDOCAINE (LMX) 4 % CREAM        METFORMIN (GLUCOPHAGE) 500 MG TABLET    Take 500 mg by mouth 2 times daily (with meals)     MILNACIPRAN HCL (SAVELLA PO)    Take 50 mg by mouth 2 times daily     MONTELUKAST (SINGULAIR) 10 MG TABLET    Take 10 mg by mouth nightly.     OMEPRAZOLE (PRILOSEC) 40 MG DELAYED RELEASE CAPSULE    Take 20 mg by mouth in the morning and at bedtime     SIMVASTATIN (ZOCOR) 5 MG TABLET    Take 5 mg by mouth nightly     TIZANIDINE (ZANAFLEX) 4 MG TABLET    Take 4 mg by mouth every 8 hours as needed Talking 3 times a day    TOPIRAMATE (TOPAMAX) 100 MG TABLET    Take 100 mg by mouth 2 times daily    UBROGEPANT (UBRELVY) 100 MG TABS    Take 100 mg by mouth daily as needed (migraine) Repeat in 2 hours if first dose ineffective; no more than 2 doses in 24 hours    VALACYCLOVIR (VALTREX) 500 MG TABLET    Take 500 mg by mouth as needed     VITAMIN D (ERGOCALCIFEROL) 1.25 MG (41722 UT) CAPS CAPSULE    once a week    Modified Medications    No medications on file   Discontinued Medications    CHOLECALCIFEROL (VITAMIN D3) 50 MCG (2000 UT) CAPS    Take by mouth daily    ONDANSETRON (ZOFRAN) 8 MG TABLET    Take 1 tablet by mouth every 8 hours as needed for Nausea or Vomiting         Counseling: The emergency provider has spoken with the patient and discussed todays results, in addition to providing specific details for the plan of care and counseling regarding the diagnosis and prognosis. Questions are answered at this time and they are agreeable with the plan.      --------------------------------- IMPRESSION AND DISPOSITION ---------------------------------    IMPRESSION  1.  Labial swelling        DISPOSITION  Disposition: Discharge to home  Patient condition is good                  Alicia Fountain MD  03/15/23 [id number]

## 2023-04-20 ENCOUNTER — OFFICE VISIT (OUTPATIENT)
Dept: NEUROLOGY | Age: 43
End: 2023-04-20
Payer: COMMERCIAL

## 2023-04-20 VITALS
WEIGHT: 225 LBS | HEART RATE: 67 BPM | DIASTOLIC BLOOD PRESSURE: 90 MMHG | TEMPERATURE: 97 F | BODY MASS INDEX: 41.41 KG/M2 | SYSTOLIC BLOOD PRESSURE: 130 MMHG | OXYGEN SATURATION: 97 % | HEIGHT: 62 IN

## 2023-04-20 VITALS
HEIGHT: 62 IN | BODY MASS INDEX: 41.41 KG/M2 | HEART RATE: 67 BPM | TEMPERATURE: 97.4 F | DIASTOLIC BLOOD PRESSURE: 90 MMHG | WEIGHT: 225 LBS | SYSTOLIC BLOOD PRESSURE: 130 MMHG | OXYGEN SATURATION: 97 %

## 2023-04-20 DIAGNOSIS — G43.709 CHRONIC MIGRAINE WITHOUT AURA WITHOUT STATUS MIGRAINOSUS, NOT INTRACTABLE: Primary | ICD-10-CM

## 2023-04-20 PROCEDURE — G8417 CALC BMI ABV UP PARAM F/U: HCPCS | Performed by: NURSE PRACTITIONER

## 2023-04-20 PROCEDURE — 3080F DIAST BP >= 90 MM HG: CPT | Performed by: NURSE PRACTITIONER

## 2023-04-20 PROCEDURE — G8427 DOCREV CUR MEDS BY ELIG CLIN: HCPCS | Performed by: NURSE PRACTITIONER

## 2023-04-20 PROCEDURE — 1036F TOBACCO NON-USER: CPT | Performed by: NURSE PRACTITIONER

## 2023-04-20 PROCEDURE — 3075F SYST BP GE 130 - 139MM HG: CPT | Performed by: NURSE PRACTITIONER

## 2023-04-20 PROCEDURE — 64615 CHEMODENERV MUSC MIGRAINE: CPT | Performed by: NURSE PRACTITIONER

## 2023-04-20 PROCEDURE — 99214 OFFICE O/P EST MOD 30 MIN: CPT | Performed by: NURSE PRACTITIONER

## 2023-04-20 RX ORDER — UBROGEPANT 100 MG/1
100 TABLET ORAL DAILY PRN
Qty: 6 TABLET | Refills: 0 | COMMUNITY
Start: 2023-04-20

## 2023-04-20 NOTE — PROGRESS NOTES
Botulinum Toxin Injection Procedure Note    Pre-procedure Diagnosis: Chronic migraine    Indications: same    Procedure Details   The risks, benefits, indications, potential complications, and alternatives were explained to the patient and informed consent obtained. The following 31 injection sites were injected with botulinum toxin:    Bilateral Corrugators: 5 units each side  Procerus: 5 units  Bilateral frontalis: 10 units each side  Bilateral temporalis: 20 units each side  Bilateral occipitalis: 15 units each side  Bilateral cervical paraspinals: 10 units each side  Bilateral trapezius: 15 units each side    Total: 155 units      Botulinum toxin Lot #:[id number]  Botulinum toxin expiration date: 5/2025  Total botox units injected: 155 units  Total botox units wasted: 45 units    The patient tolerated the procedure well. Plan:    Call with any facial muscle weakness, ptosis, neck weakness, difficulty swallowing, difficulty speaking, or diffculty breathing    Keep a careful HA diary    Return to clinic for repeat botulinum injection in 3 months.      Adonis Bonilla APRN - CNP-AQH  10:07 AM

## 2023-04-20 NOTE — PROGRESS NOTES
month that respond to [place]. Exam is unchanged.      Plan:     -Continue Botox  -Continue Ubrelvy 100 mg PRN--extra samples given  -HA diary    RTO for Botox treatments--routine follow ups yearly    NADIR Penny - CNP, [facility]  10:12 AM  4/20/2023

## 2023-05-09 ENCOUNTER — OFFICE VISIT (OUTPATIENT)
Dept: ORTHOPEDIC SURGERY | Age: 43
End: 2023-05-09

## 2023-05-09 VITALS — BODY MASS INDEX: 40.85 KG/M2 | HEIGHT: 62 IN | WEIGHT: 222 LBS | TEMPERATURE: 98 F

## 2023-05-09 DIAGNOSIS — Z96.651 HISTORY OF ARTHROPLASTY OF RIGHT KNEE: Primary | ICD-10-CM

## 2023-05-20 ENCOUNTER — APPOINTMENT (OUTPATIENT)
Dept: GENERAL RADIOLOGY | Age: 43
End: 2023-05-20
Payer: COMMERCIAL

## 2023-05-20 ENCOUNTER — HOSPITAL ENCOUNTER (EMERGENCY)
Age: 43
Discharge: HOME | End: 2023-05-20
Attending: STUDENT IN AN ORGANIZED HEALTH CARE EDUCATION/TRAINING PROGRAM
Payer: COMMERCIAL

## 2023-05-20 VITALS
RESPIRATION RATE: 16 BRPM | HEART RATE: 82 BPM | SYSTOLIC BLOOD PRESSURE: 137 MMHG | WEIGHT: 221 LBS | OXYGEN SATURATION: 100 % | TEMPERATURE: 97.1 F | DIASTOLIC BLOOD PRESSURE: 95 MMHG | HEIGHT: 62 IN | BODY MASS INDEX: 40.67 KG/M2

## 2023-05-20 DIAGNOSIS — J06.9 URI WITH COUGH AND CONGESTION: Primary | ICD-10-CM

## 2023-05-20 PROCEDURE — 99283 EMERGENCY DEPT VISIT LOW MDM: CPT

## 2023-05-20 PROCEDURE — 71046 X-RAY EXAM CHEST 2 VIEWS: CPT

## 2023-05-20 RX ORDER — OXYMETAZOLINE HYDROCHLORIDE 0.05 G/100ML
1 SPRAY NASAL 2 TIMES DAILY PRN
Qty: 20 ML | Refills: 3 | Status: SHIPPED | OUTPATIENT
Start: 2023-05-20

## 2023-05-20 RX ORDER — BROMPHENIRAMINE MALEATE, PSEUDOEPHEDRINE HYDROCHLORIDE, AND DEXTROMETHORPHAN HYDROBROMIDE 2; 30; 10 MG/5ML; MG/5ML; MG/5ML
5 SYRUP ORAL 4 TIMES DAILY PRN
Qty: 118 ML | Refills: 0 | Status: SHIPPED | OUTPATIENT
Start: 2023-05-20

## 2023-05-20 RX ORDER — GUAIFENESIN 600 MG/1
1200 TABLET, EXTENDED RELEASE ORAL EVERY 12 HOURS PRN
Qty: 20 TABLET | Refills: 0 | Status: SHIPPED | OUTPATIENT
Start: 2023-05-20

## 2023-05-20 RX ORDER — BENZONATATE 100 MG/1
100 CAPSULE ORAL EVERY 8 HOURS PRN
Qty: 20 CAPSULE | Refills: 0 | Status: SHIPPED | OUTPATIENT
Start: 2023-05-20

## 2023-05-20 ASSESSMENT — ENCOUNTER SYMPTOMS
SHORTNESS OF BREATH: 0
NAUSEA: 0
VOMITING: 0
COUGH: 1

## 2023-05-20 NOTE — ED PROVIDER NOTES
[facility] Emergency  ED Provider Note  Department of Emergency Medicine     ED Room:       Written by: Key Steel DO  Patient Name: Mohan Gonzalez  Admit Date: 2023 11:45 AM  MRN: [id number]    : 1980        Chief Complaint   Patient presents with    Nasal Congestion     Started Monday cough, chest and head congestion, productive cough green mucus, left ear pain     - Chief complaint    HPI   Mohan Gonzalez is a 37 y.o. female presenting to the ED for evaluation of Nasal Congestion (Started Monday cough, chest and head congestion, productive cough green mucus, left ear pain )      History obtained from the patient and chart review. Patient is a 49-year-old female presenting to the ED for evaluation of 6 days of cough, congestion, and left ear pain. Denies fevers. Reports cough is productive for green mucus. Reports this happens to her frequently in the past.  She has not had any chest pain or palpitations or shortness of breath. Denies any sore throat, numbness or weakness anywhere, nausea vomiting or diarrhea or abdominal pain. Denies known sick contacts. Denies any ear drainage. Denies any difficulty speaking or swallowing. She does state that she has taken Deepthi-Drake for her symptoms and it has not helped. She does state that she is allergic to steroids as they will cause her to have heart palpitations. Review of Systems   Constitutional:  Negative for chills and fever. HENT:  Positive for congestion and ear pain. Negative for ear discharge. Respiratory:  Positive for cough. Negative for shortness of breath. Gastrointestinal:  Negative for nausea and vomiting. Musculoskeletal:  Negative for neck pain and neck stiffness. All other systems reviewed and are negative. Physical Exam  Vitals and nursing note reviewed. Constitutional:       General: She is not in acute distress. Appearance: She is not toxic-appearing.    HENT:      Head:

## 2023-05-25 ENCOUNTER — HOSPITAL ENCOUNTER (OUTPATIENT)
Dept: INFUSION THERAPY | Age: 43
Discharge: HOME | End: 2023-05-25
Payer: COMMERCIAL

## 2023-05-25 ENCOUNTER — OFFICE VISIT (OUTPATIENT)
Dept: ONCOLOGY | Age: 43
End: 2023-05-25
Payer: COMMERCIAL

## 2023-05-25 VITALS
TEMPERATURE: 97.7 F | BODY MASS INDEX: 41.07 KG/M2 | HEIGHT: 62 IN | SYSTOLIC BLOOD PRESSURE: 125 MMHG | OXYGEN SATURATION: 100 % | DIASTOLIC BLOOD PRESSURE: 96 MMHG | WEIGHT: 223.2 LBS | HEART RATE: 71 BPM

## 2023-05-25 DIAGNOSIS — D64.9 ANEMIA, UNSPECIFIED TYPE: ICD-10-CM

## 2023-05-25 DIAGNOSIS — D64.9 ANEMIA, UNSPECIFIED TYPE: Primary | ICD-10-CM

## 2023-05-25 LAB
ALBUMIN SERPL-MCNC: 4 G/DL (ref 3.5–5.2)
ALP SERPL-CCNC: 93 U/L (ref 35–104)
ALT SERPL-CCNC: 12 U/L (ref 0–32)
ANION GAP SERPL CALCULATED.3IONS-SCNC: 9 MMOL/L (ref 7–16)
AST SERPL-CCNC: 17 U/L (ref 0–31)
BASOPHILS # BLD: 0.03 E9/L (ref 0–0.2)
BASOPHILS NFR BLD: 0.6 % (ref 0–2)
BILIRUB SERPL-MCNC: 0.2 MG/DL (ref 0–1.2)
BUN SERPL-MCNC: 15 MG/DL (ref 6–20)
CALCIUM SERPL-MCNC: 9.5 MG/DL (ref 8.6–10.2)
CHLORIDE SERPL-SCNC: 108 MMOL/L (ref 98–107)
CO2 SERPL-SCNC: 25 MMOL/L (ref 22–29)
CREAT SERPL-MCNC: 1 MG/DL (ref 0.5–1)
EOSINOPHIL # BLD: 0.13 E9/L (ref 0.05–0.5)
EOSINOPHIL NFR BLD: 2.5 % (ref 0–6)
ERYTHROCYTE [DISTWIDTH] IN BLOOD BY AUTOMATED COUNT: 12.1 FL (ref 11.5–15)
FERRITIN SERPL-MCNC: 72 NG/ML
GLUCOSE SERPL-MCNC: 115 MG/DL (ref 74–99)
HCT VFR BLD AUTO: 42.8 % (ref 34–48)
HGB BLD-MCNC: 13.6 G/DL (ref 11.5–15.5)
IMM GRANULOCYTES # BLD: 0.02 E9/L
IMM GRANULOCYTES NFR BLD: 0.4 % (ref 0–5)
IRON SATN MFR SERPL: 39 % (ref 15–50)
IRON SERPL-MCNC: 103 MCG/DL (ref 37–145)
LYMPHOCYTES # BLD: 1.18 E9/L (ref 1.5–4)
LYMPHOCYTES NFR BLD: 23 % (ref 20–42)
MCH RBC QN AUTO: 32.2 PG (ref 26–35)
MCHC RBC AUTO-ENTMCNC: 31.8 % (ref 32–34.5)
MCV RBC AUTO: 101.4 FL (ref 80–99.9)
MONOCYTES # BLD: 0.27 E9/L (ref 0.1–0.95)
MONOCYTES NFR BLD: 5.3 % (ref 2–12)
NEUTROPHILS # BLD: 3.49 E9/L (ref 1.8–7.3)
NEUTS SEG NFR BLD: 68.2 % (ref 43–80)
PLATELET # BLD AUTO: 236 E9/L (ref 130–450)
PMV BLD AUTO: 11.3 FL (ref 7–12)
POTASSIUM SERPL-SCNC: 4.5 MMOL/L (ref 3.5–5)
PROT SERPL-MCNC: 6.8 G/DL (ref 6.4–8.3)
RBC # BLD AUTO: 4.22 E12/L (ref 3.5–5.5)
SODIUM SERPL-SCNC: 142 MMOL/L (ref 132–146)
TIBC SERPL-MCNC: 265 MCG/DL (ref 250–450)
WBC # BLD: 5.1 E9/L (ref 4.5–11.5)

## 2023-05-25 PROCEDURE — 36415 COLL VENOUS BLD VENIPUNCTURE: CPT

## 2023-05-25 PROCEDURE — G8427 DOCREV CUR MEDS BY ELIG CLIN: HCPCS | Performed by: INTERNAL MEDICINE

## 2023-05-25 PROCEDURE — 3074F SYST BP LT 130 MM HG: CPT | Performed by: INTERNAL MEDICINE

## 2023-05-25 PROCEDURE — 82728 ASSAY OF FERRITIN: CPT

## 2023-05-25 PROCEDURE — 1036F TOBACCO NON-USER: CPT | Performed by: INTERNAL MEDICINE

## 2023-05-25 PROCEDURE — 83550 IRON BINDING TEST: CPT

## 2023-05-25 PROCEDURE — 99213 OFFICE O/P EST LOW 20 MIN: CPT | Performed by: INTERNAL MEDICINE

## 2023-05-25 PROCEDURE — 99212 OFFICE O/P EST SF 10 MIN: CPT

## 2023-05-25 PROCEDURE — 80053 COMPREHEN METABOLIC PANEL: CPT

## 2023-05-25 PROCEDURE — 85025 COMPLETE CBC W/AUTO DIFF WBC: CPT

## 2023-05-25 PROCEDURE — 83540 ASSAY OF IRON: CPT

## 2023-05-25 PROCEDURE — G8417 CALC BMI ABV UP PARAM F/U: HCPCS | Performed by: INTERNAL MEDICINE

## 2023-05-25 PROCEDURE — 3080F DIAST BP >= 90 MM HG: CPT | Performed by: INTERNAL MEDICINE

## 2023-05-25 NOTE — PROGRESS NOTES
[address] ONCOLOGY  [facility] [address]  Dept: [phone]  Loc: [phone]  Attending progress note      Reason for Visit: Iron deficiency. Referring Physician:  NADIR Marquez CNP    PCP:  NADIR Marquez CNP    History of Present Illness:      Mrs. Jonah Cosby is a pleasant 80-year-old lady, with a past medical history significant for asthma, anxiety and depression, DM, GERD, CKD, hyperlipidemia, hypertension, and fibromyalgia, who was referred to the hematology office for evaluation of iron deficiency that was not responsive to the oral iron. The patient's hemoglobin hematocrit are normal, but she has persistent iron deficiency, she had blood work done with Dr. Abilio Arnold, was told she needed parenteral iron infusion. She had GI side effects with the oral iron. The patient received the first dose of Feraheme on 1/6/2022, she had nausea for 2 days after receiving the infusion. She received Zofran with the second dose. The patient returns the office for a follow-up visit, she has right knee pain, she denies any bleeding, good energy level, her periods have not been heavy. Review of Systems;  CONSTITUTIONAL: No fever, chills. Good appetite, and energy level. ENMT: Eyes: No diplopia; Nose: No epistaxis. Mouth: No sore throat. RESPIRATORY: No hemoptysis, shortness of breath, cough. CARDIOVASCULAR: No chest pain, palpitations. GASTROINTESTINAL: Positive for nausea, no abdominal pain, diarrhea/constipation. GENITOURINARY: No dysuria, urinary frequency, hematuria. NEURO: No syncope, presyncope, pos for headache.    Remainder:  ROS NEGATIVE    Past Medical History:      Diagnosis Date    Anxiety and depression     Arthritis     Asthma     Bulging lumbar disc     Cervical disc disease     Diabetes mellitus ([address]     diet controlled    Ectopic pregnancy     Fasciitis     Right    Fibromyalgia

## 2023-06-04 ENCOUNTER — HOSPITAL ENCOUNTER (EMERGENCY)
Age: 43
Discharge: HOME | End: 2023-06-04
Attending: FAMILY MEDICINE
Payer: COMMERCIAL

## 2023-06-04 VITALS
TEMPERATURE: 97.3 F | HEIGHT: 63 IN | SYSTOLIC BLOOD PRESSURE: 120 MMHG | DIASTOLIC BLOOD PRESSURE: 89 MMHG | HEART RATE: 81 BPM | BODY MASS INDEX: 38.98 KG/M2 | RESPIRATION RATE: 16 BRPM | OXYGEN SATURATION: 100 % | WEIGHT: 220 LBS

## 2023-06-04 DIAGNOSIS — S61.211A LACERATION OF LEFT INDEX FINGER WITHOUT FOREIGN BODY WITHOUT DAMAGE TO NAIL, INITIAL ENCOUNTER: Primary | ICD-10-CM

## 2023-06-04 PROCEDURE — 99283 EMERGENCY DEPT VISIT LOW MDM: CPT

## 2023-06-04 RX ORDER — CEPHALEXIN 500 MG/1
500 CAPSULE ORAL 4 TIMES DAILY
Qty: 40 CAPSULE | Refills: 0 | Status: SHIPPED | OUTPATIENT
Start: 2023-06-04 | End: 2023-06-14

## 2023-06-06 ENCOUNTER — HOSPITAL ENCOUNTER (OUTPATIENT)
Age: 43
Discharge: HOME | End: 2023-06-06
Payer: COMMERCIAL

## 2023-06-06 LAB
ALBUMIN SERPL-MCNC: 4.4 G/DL (ref 3.5–5.2)
ANION GAP SERPL CALCULATED.3IONS-SCNC: 9 MMOL/L (ref 7–16)
BACTERIA URNS QL MICRO: ABNORMAL /HPF
BASOPHILS # BLD: 0.05 E9/L (ref 0–0.2)
BASOPHILS NFR BLD: 1.1 % (ref 0–2)
BILIRUB UR QL STRIP: NEGATIVE
BUN SERPL-MCNC: 21 MG/DL (ref 6–20)
CALCIUM SERPL-MCNC: 9.4 MG/DL (ref 8.6–10.2)
CHLORIDE SERPL-SCNC: 106 MMOL/L (ref 98–107)
CLARITY UR: CLEAR
CO2 SERPL-SCNC: 22 MMOL/L (ref 22–29)
COLOR UR: YELLOW
CREAT SERPL-MCNC: 1.1 MG/DL (ref 0.5–1)
CREAT UR-MCNC: 311 MG/DL (ref 29–226)
CRYSTALS, UA: ABNORMAL /HPF
EOSINOPHIL # BLD: 0.15 E9/L (ref 0.05–0.5)
EOSINOPHIL NFR BLD: 3.3 % (ref 0–6)
EPI CELLS #/AREA URNS HPF: ABNORMAL /HPF
ERYTHROCYTE [DISTWIDTH] IN BLOOD BY AUTOMATED COUNT: 12.1 FL (ref 11.5–15)
GLUCOSE SERPL-MCNC: 109 MG/DL (ref 74–99)
GLUCOSE UR STRIP-MCNC: NEGATIVE MG/DL
HCT VFR BLD AUTO: 46.3 % (ref 34–48)
HGB BLD-MCNC: 14.2 G/DL (ref 11.5–15.5)
HGB UR QL STRIP: NEGATIVE
IMM GRANULOCYTES # BLD: 0.02 E9/L
IMM GRANULOCYTES NFR BLD: 0.4 % (ref 0–5)
KETONES UR STRIP-MCNC: NEGATIVE MG/DL
LEUKOCYTE ESTERASE UR QL STRIP: NEGATIVE
LYMPHOCYTES # BLD: 1.58 E9/L (ref 1.5–4)
LYMPHOCYTES NFR BLD: 34.4 % (ref 20–42)
MAGNESIUM SERPL-MCNC: 2.2 MG/DL (ref 1.6–2.6)
MCH RBC QN AUTO: 31.8 PG (ref 26–35)
MCHC RBC AUTO-ENTMCNC: 30.7 % (ref 32–34.5)
MCV RBC AUTO: 103.8 FL (ref 80–99.9)
MICROALBUMIN UR-MCNC: 15.3 MG/L
MICROALBUMIN/CREAT UR-RTO: 4.9 (ref 0–30)
MONOCYTES # BLD: 0.28 E9/L (ref 0.1–0.95)
MONOCYTES NFR BLD: 6.1 % (ref 2–12)
NEUTROPHILS # BLD: 2.51 E9/L (ref 1.8–7.3)
NEUTS SEG NFR BLD: 54.7 % (ref 43–80)
NITRITE UR QL STRIP: NEGATIVE
PH UR STRIP: 5 [PH] (ref 5–9)
PHOSPHATE SERPL-MCNC: 3.5 MG/DL (ref 2.5–4.5)
PLATELET # BLD AUTO: 245 E9/L (ref 130–450)
PMV BLD AUTO: 11.8 FL (ref 7–12)
POTASSIUM SERPL-SCNC: 4.2 MMOL/L (ref 3.5–5)
PROT UR STRIP-MCNC: NORMAL MG/DL
PROT UR-MCNC: 57 MG/DL (ref 0–12)
PROTEIN/CREAT RATIO: 0.2
PROTEIN/CREAT RATIO: 0.2 (ref 0–0.2)
PTH-INTACT SERPL-MCNC: 29 PG/ML (ref 15–65)
RBC # BLD AUTO: 4.46 E12/L (ref 3.5–5.5)
RBC #/AREA URNS HPF: ABNORMAL /HPF (ref 0–2)
SODIUM SERPL-SCNC: 137 MMOL/L (ref 132–146)
SP GR UR STRIP: >=1.03 (ref 1–1.03)
UROBILINOGEN UR STRIP-ACNC: 0.2 E.U./DL
WBC # BLD: 4.6 E9/L (ref 4.5–11.5)
WBC #/AREA URNS HPF: ABNORMAL /HPF (ref 0–5)

## 2023-06-06 PROCEDURE — 85025 COMPLETE CBC W/AUTO DIFF WBC: CPT

## 2023-06-06 PROCEDURE — 83735 ASSAY OF MAGNESIUM: CPT

## 2023-06-06 PROCEDURE — 82570 ASSAY OF URINE CREATININE: CPT

## 2023-06-06 PROCEDURE — 81001 URINALYSIS AUTO W/SCOPE: CPT

## 2023-06-06 PROCEDURE — 80069 RENAL FUNCTION PANEL: CPT

## 2023-06-06 PROCEDURE — 83970 ASSAY OF PARATHORMONE: CPT

## 2023-06-06 PROCEDURE — 36415 COLL VENOUS BLD VENIPUNCTURE: CPT

## 2023-06-06 PROCEDURE — 84156 ASSAY OF PROTEIN URINE: CPT

## 2023-06-06 PROCEDURE — 82044 UR ALBUMIN SEMIQUANTITATIVE: CPT

## 2023-07-20 ENCOUNTER — TELEPHONE (OUTPATIENT)
Dept: NEUROLOGY | Age: 43
End: 2023-07-20

## 2023-07-20 NOTE — TELEPHONE ENCOUNTER
Prior auth for botox was faxed to [employer]. Approved by Comfort Mooney 7/21/23 to 7/21/24.     PA # [id number]

## 2023-07-21 ENCOUNTER — OFFICE VISIT (OUTPATIENT)
Dept: NEUROLOGY | Age: 43
End: 2023-07-21
Payer: COMMERCIAL

## 2023-07-21 VITALS
WEIGHT: 220 LBS | HEART RATE: 82 BPM | TEMPERATURE: 97.6 F | OXYGEN SATURATION: 100 % | BODY MASS INDEX: 38.97 KG/M2 | DIASTOLIC BLOOD PRESSURE: 96 MMHG | SYSTOLIC BLOOD PRESSURE: 134 MMHG

## 2023-07-21 DIAGNOSIS — G43.709 CHRONIC MIGRAINE WITHOUT AURA WITHOUT STATUS MIGRAINOSUS, NOT INTRACTABLE: Primary | ICD-10-CM

## 2023-07-21 PROCEDURE — 64615 CHEMODENERV MUSC MIGRAINE: CPT | Performed by: NURSE PRACTITIONER

## 2023-07-21 RX ORDER — OXYCODONE HYDROCHLORIDE AND ACETAMINOPHEN 5; 325 MG/1; MG/1
TABLET ORAL
COMMUNITY
Start: 2023-07-17

## 2023-07-21 NOTE — PROGRESS NOTES
Botulinum Toxin Injection Procedure Note    Pre-procedure Diagnosis: Chronic migraine  Auth good until 7/21/24    Indications: same    Procedure Details   The risks, benefits, indications, potential complications, and alternatives were explained to the patient and informed consent obtained. The following 31 injection sites were injected with botulinum toxin:    Bilateral Corrugators: 5 units each side  Procerus: 5 units  Bilateral frontalis: 10 units each side  Bilateral temporalis: 20 units each side  Bilateral occipitalis: 15 units each side  Bilateral cervical paraspinals: 10 units each side  Bilateral trapezius: 15 units each side    Total: 155 units      Botulinum toxin Lot #: [id number]  Botulinum toxin expiration date: 2/2026  Total botox units injected: 155 units  Total botox units wasted: 45 units    The patient tolerated the procedure well. Plan:    Call with any facial muscle weakness, ptosis, neck weakness, difficulty swallowing, difficulty speaking, or diffculty breathing    Keep a careful HA diary    Return to clinic for repeat botulinum injection in 3 months.      Ivis Cameron APRN - CNP-AQH  10:55 AM

## 2023-08-01 ENCOUNTER — APPOINTMENT (OUTPATIENT)
Dept: CT IMAGING | Age: 43
End: 2023-08-01
Payer: COMMERCIAL

## 2023-08-01 ENCOUNTER — HOSPITAL ENCOUNTER (EMERGENCY)
Age: 43
Discharge: HOME | End: 2023-08-01
Attending: STUDENT IN AN ORGANIZED HEALTH CARE EDUCATION/TRAINING PROGRAM
Payer: COMMERCIAL

## 2023-08-01 VITALS
OXYGEN SATURATION: 100 % | RESPIRATION RATE: 18 BRPM | DIASTOLIC BLOOD PRESSURE: 81 MMHG | TEMPERATURE: 97.7 F | HEART RATE: 80 BPM | SYSTOLIC BLOOD PRESSURE: 151 MMHG

## 2023-08-01 DIAGNOSIS — N93.9 VAGINAL BLEEDING: ICD-10-CM

## 2023-08-01 DIAGNOSIS — R11.2 NAUSEA AND VOMITING, UNSPECIFIED VOMITING TYPE: ICD-10-CM

## 2023-08-01 DIAGNOSIS — R42 LIGHTHEADED: Primary | ICD-10-CM

## 2023-08-01 LAB
ALBUMIN SERPL-MCNC: 4.6 G/DL (ref 3.5–5.2)
ALP SERPL-CCNC: 80 U/L (ref 35–104)
ALT SERPL-CCNC: 19 U/L (ref 0–32)
ANION GAP SERPL CALCULATED.3IONS-SCNC: 11 MMOL/L (ref 7–16)
AST SERPL-CCNC: 18 U/L (ref 0–31)
B-HCG SERPL EIA 3RD IS-ACNC: <0.1 MIU/ML (ref 0–7)
BACTERIA URNS QL MICRO: ABNORMAL
BASOPHILS # BLD: 0.02 K/UL (ref 0–0.2)
BASOPHILS NFR BLD: 1 % (ref 0–2)
BILIRUB SERPL-MCNC: 0.3 MG/DL (ref 0–1.2)
BILIRUB UR QL STRIP: ABNORMAL
BUN SERPL-MCNC: 17 MG/DL (ref 6–20)
CALCIUM SERPL-MCNC: 9.9 MG/DL (ref 8.6–10.2)
CHLORIDE SERPL-SCNC: 106 MMOL/L (ref 98–107)
CLARITY UR: ABNORMAL
CO2 SERPL-SCNC: 23 MMOL/L (ref 22–29)
COLOR UR: ABNORMAL
CREAT SERPL-MCNC: 1 MG/DL (ref 0.5–1)
EOSINOPHIL # BLD: 0.18 K/UL (ref 0.05–0.5)
EOSINOPHILS RELATIVE PERCENT: 4 % (ref 0–6)
EPI CELLS #/AREA URNS HPF: ABNORMAL /HPF
ERYTHROCYTE [DISTWIDTH] IN BLOOD BY AUTOMATED COUNT: 12 % (ref 11.5–15)
GFR SERPL CREATININE-BSD FRML MDRD: >60 ML/MIN/1.73M2
GLUCOSE SERPL-MCNC: 98 MG/DL (ref 74–99)
GLUCOSE UR STRIP-MCNC: ABNORMAL MG/DL
HCT VFR BLD AUTO: 44.7 % (ref 34–48)
HGB BLD-MCNC: 14.2 G/DL (ref 11.5–15.5)
HGB UR QL STRIP.AUTO: ABNORMAL
IMM GRANULOCYTES # BLD AUTO: <0.03 K/UL (ref 0–0.58)
IMM GRANULOCYTES NFR BLD: 0 % (ref 0–5)
KETONES UR STRIP-MCNC: ABNORMAL MG/DL
LACTATE BLDV-SCNC: 0.8 MMOL/L (ref 0.5–2.2)
LEUKOCYTE ESTERASE UR QL STRIP: ABNORMAL
LIPASE SERPL-CCNC: 93 U/L (ref 13–60)
LYMPHOCYTES NFR BLD: 1.49 K/UL (ref 1.5–4)
LYMPHOCYTES RELATIVE PERCENT: 36 % (ref 20–42)
MCH RBC QN AUTO: 32.1 PG (ref 26–35)
MCHC RBC AUTO-ENTMCNC: 31.8 G/DL (ref 32–34.5)
MCV RBC AUTO: 101.1 FL (ref 80–99.9)
MONOCYTES NFR BLD: 0.28 K/UL (ref 0.1–0.95)
MONOCYTES NFR BLD: 7 % (ref 2–12)
NEUTROPHILS NFR BLD: 52 % (ref 43–80)
NEUTS SEG NFR BLD: 2.17 K/UL (ref 1.8–7.3)
NITRITE UR QL STRIP: ABNORMAL
PH UR STRIP: ABNORMAL [PH] (ref 5–9)
PLATELET # BLD AUTO: 207 K/UL (ref 130–450)
PMV BLD AUTO: 11.8 FL (ref 7–12)
POTASSIUM SERPL-SCNC: 4.3 MMOL/L (ref 3.5–5)
PROT SERPL-MCNC: 7 G/DL (ref 6.4–8.3)
PROT UR STRIP-MCNC: ABNORMAL MG/DL
RBC # BLD AUTO: 4.42 M/UL (ref 3.5–5.5)
RBC #/AREA URNS HPF: ABNORMAL /HPF
SODIUM SERPL-SCNC: 140 MMOL/L (ref 132–146)
SP GR UR STRIP: ABNORMAL (ref 1–1.03)
TROPONIN I SERPL HS-MCNC: 6 NG/L (ref 0–9)
UROBILINOGEN UR STRIP-ACNC: ABNORMAL EU/DL (ref 0–1)
WBC #/AREA URNS HPF: ABNORMAL /HPF
WBC OTHER # BLD: 4.2 K/UL (ref 4.5–11.5)

## 2023-08-01 PROCEDURE — 99285 EMERGENCY DEPT VISIT HI MDM: CPT

## 2023-08-01 PROCEDURE — 6370000000 HC RX 637 (ALT 250 FOR IP): Performed by: STUDENT IN AN ORGANIZED HEALTH CARE EDUCATION/TRAINING PROGRAM

## 2023-08-01 PROCEDURE — 85025 COMPLETE CBC W/AUTO DIFF WBC: CPT

## 2023-08-01 PROCEDURE — 36415 COLL VENOUS BLD VENIPUNCTURE: CPT

## 2023-08-01 PROCEDURE — 6360000002 HC RX W HCPCS: Performed by: STUDENT IN AN ORGANIZED HEALTH CARE EDUCATION/TRAINING PROGRAM

## 2023-08-01 PROCEDURE — 96374 THER/PROPH/DIAG INJ IV PUSH: CPT

## 2023-08-01 PROCEDURE — 96375 TX/PRO/DX INJ NEW DRUG ADDON: CPT

## 2023-08-01 PROCEDURE — 84702 CHORIONIC GONADOTROPIN TEST: CPT

## 2023-08-01 PROCEDURE — 93005 ELECTROCARDIOGRAM TRACING: CPT | Performed by: STUDENT IN AN ORGANIZED HEALTH CARE EDUCATION/TRAINING PROGRAM

## 2023-08-01 PROCEDURE — 74177 CT ABD & PELVIS W/CONTRAST: CPT

## 2023-08-01 PROCEDURE — 80053 COMPREHEN METABOLIC PANEL: CPT

## 2023-08-01 PROCEDURE — 81001 URINALYSIS AUTO W/SCOPE: CPT

## 2023-08-01 PROCEDURE — 84484 ASSAY OF TROPONIN QUANT: CPT

## 2023-08-01 PROCEDURE — 83690 ASSAY OF LIPASE: CPT

## 2023-08-01 PROCEDURE — 83605 ASSAY OF LACTIC ACID: CPT

## 2023-08-01 PROCEDURE — 6360000004 HC RX CONTRAST MEDICATION: Performed by: RADIOLOGY

## 2023-08-01 RX ORDER — METOCLOPRAMIDE HYDROCHLORIDE 5 MG/ML
10 INJECTION INTRAMUSCULAR; INTRAVENOUS ONCE
Status: COMPLETED | OUTPATIENT
Start: 2023-08-01 | End: 2023-08-01

## 2023-08-01 RX ORDER — ONDANSETRON 2 MG/ML
4 INJECTION INTRAMUSCULAR; INTRAVENOUS ONCE
Status: COMPLETED | OUTPATIENT
Start: 2023-08-01 | End: 2023-08-01

## 2023-08-01 RX ORDER — MECLIZINE HYDROCHLORIDE 25 MG/1
25 TABLET ORAL 3 TIMES DAILY PRN
Qty: 15 TABLET | Refills: 0 | Status: SHIPPED | OUTPATIENT
Start: 2023-08-01 | End: 2023-08-11

## 2023-08-01 RX ORDER — MECLIZINE HCL 12.5 MG/1
25 TABLET ORAL ONCE
Status: COMPLETED | OUTPATIENT
Start: 2023-08-01 | End: 2023-08-01

## 2023-08-01 RX ADMIN — MECLIZINE 25 MG: 12.5 TABLET ORAL at 21:17

## 2023-08-01 RX ADMIN — METOCLOPRAMIDE HYDROCHLORIDE 10 MG: 5 INJECTION INTRAMUSCULAR; INTRAVENOUS at 16:49

## 2023-08-01 RX ADMIN — ONDANSETRON 4 MG: 2 INJECTION INTRAMUSCULAR; INTRAVENOUS at 14:55

## 2023-08-01 RX ADMIN — IOPAMIDOL 70 ML: 755 INJECTION, SOLUTION INTRAVENOUS at 20:02

## 2023-08-01 ASSESSMENT — PAIN - FUNCTIONAL ASSESSMENT: PAIN_FUNCTIONAL_ASSESSMENT: 0-10

## 2023-08-01 ASSESSMENT — PAIN SCALES - GENERAL: PAINLEVEL_OUTOF10: 9

## 2023-08-02 LAB
EKG ATRIAL RATE: 60 BPM
EKG P AXIS: 8 DEGREES
EKG P-R INTERVAL: 166 MS
EKG Q-T INTERVAL: 450 MS
EKG QRS DURATION: 82 MS
EKG QTC CALCULATION (BAZETT): 450 MS
EKG R AXIS: -11 DEGREES
EKG T AXIS: 4 DEGREES
EKG VENTRICULAR RATE: 60 BPM

## 2023-08-02 PROCEDURE — 93010 ELECTROCARDIOGRAM REPORT: CPT | Performed by: INTERNAL MEDICINE

## 2023-08-02 NOTE — ED PROVIDER NOTES
Lymphocytes Absolute 1.49 (L) 1.50 - 4.00 k/uL    Monocytes Absolute 0.28 0.10 - 0.95 k/uL    Eosinophils Absolute 0.18 0.05 - 0.50 k/uL    Basophils Absolute 0.02 0.00 - 0.20 k/uL    Absolute Immature Granulocyte <0.03 0.00 - 0.58 k/uL   Comprehensive Metabolic Panel w/ Reflex to MG   Result Value Ref Range    Glucose 98 74 - 99 mg/dL    BUN 17 6 - 20 mg/dL    Creatinine 1.0 0.50 - 1.00 mg/dL    Est, Glom Filt Rate >60 >60 mL/min/1.73m2    Calcium 9.9 8.6 - 10.2 mg/dL    Sodium 140 132 - 146 mmol/L    Potassium 4.3 3.5 - 5.0 mmol/L    Chloride 106 98 - 107 mmol/L    CO2 23 22 - 29 mmol/L    Anion Gap 11 7 - 16 mmol/L    Alkaline Phosphatase 80 35 - 104 U/L    ALT 19 0 - 32 U/L    AST 18 0 - 31 U/L    Total Bilirubin 0.3 0.0 - 1.2 mg/dL    Total Protein 7.0 6.4 - 8.3 g/dL    Albumin 4.6 3.5 - 5.2 g/dL   Troponin   Result Value Ref Range    Troponin, High Sensitivity 6 0 - 9 ng/L   Lactic Acid   Result Value Ref Range    Lactic Acid 0.8 0.5 - 2.2 mmol/L   Lipase   Result Value Ref Range    Lipase 93 (H) 13 - 60 U/L   HCG, Quantitative, Pregnancy   Result Value Ref Range    hCG Quant <0.1 0 - 7 mIU/mL   Urinalysis with Microscopic   Result Value Ref Range    Color, UA Red (A) Yellow    Turbidity UA Turbid (A) Clear    Glucose, Ur (A) NEGATIVE mg/dL     Results may be affected due to urine color interference. Bilirubin Urine (A) NEGATIVE     Results may be affected due to urine color interference. Ketones, Urine (A) NEGATIVE mg/dL     Results may be affected due to urine color interference. Specific Gravity, UA  1.005 - 1.030     Results may be affected due to urine color interference. Urine Hgb (A) NEGATIVE     Results may be affected due to urine color interference. pH, UA  5.0 - 9.0     Results may be affected due to urine color interference. Protein, UA (A) NEGATIVE mg/dL     Results may be affected due to urine color interference.     Urobilinogen, Urine  0.0 - 1.0 EU/dL     Results may be

## 2023-08-03 ASSESSMENT — ENCOUNTER SYMPTOMS
VOMITING: 1
PHOTOPHOBIA: 0
ABDOMINAL PAIN: 1
COUGH: 0
SHORTNESS OF BREATH: 0
ABDOMINAL DISTENTION: 0
NAUSEA: 1
CHEST TIGHTNESS: 0
DIARRHEA: 1

## 2023-08-17 ENCOUNTER — HOSPITAL ENCOUNTER (EMERGENCY)
Age: 43
Discharge: HOME | End: 2023-08-17
Attending: FAMILY MEDICINE
Payer: COMMERCIAL

## 2023-08-17 VITALS
HEART RATE: 79 BPM | DIASTOLIC BLOOD PRESSURE: 79 MMHG | BODY MASS INDEX: 38.09 KG/M2 | OXYGEN SATURATION: 98 % | RESPIRATION RATE: 16 BRPM | TEMPERATURE: 98.2 F | WEIGHT: 215 LBS | SYSTOLIC BLOOD PRESSURE: 147 MMHG | HEIGHT: 63 IN

## 2023-08-17 DIAGNOSIS — L23.7 POISON IVY DERMATITIS: ICD-10-CM

## 2023-08-17 DIAGNOSIS — M77.32 CALCANEAL SPUR OF LEFT FOOT: Primary | ICD-10-CM

## 2023-08-17 PROCEDURE — 99283 EMERGENCY DEPT VISIT LOW MDM: CPT

## 2023-08-17 RX ORDER — TRIAMCINOLONE ACETONIDE 1 MG/G
CREAM TOPICAL
Qty: 60 G | Refills: 0 | Status: SHIPPED | OUTPATIENT
Start: 2023-08-17

## 2023-08-17 ASSESSMENT — PAIN DESCRIPTION - ORIENTATION: ORIENTATION: RIGHT

## 2023-08-17 ASSESSMENT — PAIN - FUNCTIONAL ASSESSMENT: PAIN_FUNCTIONAL_ASSESSMENT: 0-10

## 2023-08-17 ASSESSMENT — PAIN DESCRIPTION - DESCRIPTORS: DESCRIPTORS: THROBBING;POUNDING

## 2023-08-17 ASSESSMENT — PAIN SCALES - GENERAL: PAINLEVEL_OUTOF10: 7

## 2023-08-17 NOTE — ED PROVIDER NOTES
HPI:  8/17/23,   Time: 6:36 PM EDT         Pradip Staff is a 37 y.o. female presenting to the ED for left heel pain that she noticed yesterday, swelling on the back of her heel that she just noticed but the B heel has been sore for is several days. She has noted swelling yesterday. There is pain with ambulation and with the shoe rubbing on the area. Patient also is requesting refill for topical steroid that she is using to treat poison ivy but it has not resolved and she cannot get a hold of her primary care provider for refill. ROS:   Pertinent positives and negatives are stated within HPI, all other systems reviewed and are negative.  --------------------------------------------- PAST HISTORY ---------------------------------------------  Past Medical History:  has a past medical history of Anxiety and depression, Arthritis, Asthma, Bulging lumbar disc, Cervical disc disease, Diabetes mellitus ([address]), Ectopic pregnancy, Fasciitis, Fibromyalgia, GERD (gastroesophageal reflux disease), Heart murmur, Herpes genitalia, History of blood transfusion, Hyperlipidemia, Hypertension, IBS (irritable bowel syndrome), Migraines, Ovarian cyst, PONV (postoperative nausea and vomiting), Stage 3 chronic kidney disease ([address]), and Thyroid disease. Past Surgical History:  has a past surgical history that includes Dilation and curettage of uterus; Tubal ligation; Tonsillectomy; ECHO Compl W Dop Color Flow (08/24/2013); other surgical history; cervical fusion (07/30/2014); Knee arthroscopy (Right, 2014); Knee arthroscopy (Right, 04/01/2016); Nerve Block (Bilateral, 12/06/2016); Knee arthroscopy (Left, 12/16/2016); pr surgical arthroscopy antionette w/coracoacrm ligm rls (Left, 04/12/2018); Cervical spine surgery; Shoulder arthroscopy (Right, 01/03/2020); Knee arthroscopy (Right, 07/10/2020); Colonoscopy; Endoscopy, colon, diagnostic; Leg biopsy excision (Right, 5/26/2021);  Knee arthroscopy (Left, 3/11/2022); and Total

## 2023-09-25 ENCOUNTER — OFFICE VISIT (OUTPATIENT)
Dept: ORTHOPEDIC SURGERY | Age: 43
End: 2023-09-25
Payer: COMMERCIAL

## 2023-09-25 VITALS — BODY MASS INDEX: 38.09 KG/M2 | TEMPERATURE: 98 F | HEIGHT: 63 IN | WEIGHT: 215 LBS

## 2023-09-25 DIAGNOSIS — S83.241A ACUTE MEDIAL MENISCUS TEAR, RIGHT, INITIAL ENCOUNTER: ICD-10-CM

## 2023-09-25 DIAGNOSIS — Z96.651 HISTORY OF ARTHROPLASTY OF RIGHT KNEE: Primary | ICD-10-CM

## 2023-09-25 PROCEDURE — 1036F TOBACCO NON-USER: CPT | Performed by: ORTHOPAEDIC SURGERY

## 2023-09-25 PROCEDURE — G8417 CALC BMI ABV UP PARAM F/U: HCPCS | Performed by: ORTHOPAEDIC SURGERY

## 2023-09-25 PROCEDURE — G8427 DOCREV CUR MEDS BY ELIG CLIN: HCPCS | Performed by: ORTHOPAEDIC SURGERY

## 2023-09-25 PROCEDURE — 99213 OFFICE O/P EST LOW 20 MIN: CPT | Performed by: ORTHOPAEDIC SURGERY

## 2023-09-25 NOTE — PROGRESS NOTES
R. Knee:  Lachman's negative, Anterior Drawer negative, Posterior Drawer negative  Miranda's positive, Thallasy positive,   PF grind test negative, Apprehension test negative, Patellar J sign  negative  L. Knee:  Lachman's negative, Anterior Drawer negative, Posterior Drawer negative  Miranda's negative, Thallasy  negative,   PF grind test negative, Apprehension test negative,  Patellar J sign  negative    Xrays:   Unicompartmental arthroplasty with tiny knee joint effusion. MRI:   Not performed. Radiographic findings reviewed with patient    Impression:  Encounter Diagnoses   Name Primary? History of arthroplasty of right knee Yes    Acute medial meniscus tear, right, initial encounter            Plan:   Natural history and expected course discussed. Questions answered. Educational materials distributed. Rest, ice, compression, and elevation (RICE) therapy. Reduction in offending activity. Patellar compression sleeve. MRI right knee and when follow up will repeat Xray right knee.

## 2023-10-17 ENCOUNTER — OFFICE VISIT (OUTPATIENT)
Dept: ORTHOPEDIC SURGERY | Age: 43
End: 2023-10-17

## 2023-10-17 VITALS — BODY MASS INDEX: 37.74 KG/M2 | TEMPERATURE: 98 F | WEIGHT: 213 LBS | HEIGHT: 63 IN

## 2023-10-17 DIAGNOSIS — M17.11 PRIMARY OSTEOARTHRITIS OF RIGHT KNEE: ICD-10-CM

## 2023-10-17 DIAGNOSIS — Z96.651 HISTORY OF ARTHROPLASTY OF RIGHT KNEE: Primary | ICD-10-CM

## 2023-10-17 NOTE — PROGRESS NOTES
Chief Complaint   Patient presents with    Knee Pain     Right Knee MRI F/U        Trevor Santana returns today for follow-up of her right knee MRI. She has trouble walking, wears a brace with no relief. She had unicompartmental arthroplasty about a year ago. She is unable to have CSI due to allergy.     Past Medical History:   Diagnosis Date    Anxiety and depression     Arthritis     Asthma     Bulging lumbar disc     Cervical disc disease     Diabetes mellitus ([address])     diet controlled    Ectopic pregnancy     Fasciitis     Right    Fibromyalgia     GERD (gastroesophageal reflux disease)     Heart murmur     benign    Herpes genitalia     History of blood transfusion     Hyperlipidemia     Hypertension     IBS (irritable bowel syndrome)     Migraines     Ovarian cyst     PONV (postoperative nausea and vomiting)     with anesthesia    Stage 3 chronic kidney disease ([address])     follows w Dr Shorty Dougherty    Thyroid disease      Past Surgical History:   Procedure Laterality Date    CERVICAL FUSION  07/30/2014    anterior cervical discetomy fusion C6-C7    CERVICAL SPINE SURGERY      has a plate    COLONOSCOPY      DILATION AND CURETTAGE OF UTERUS      ECHO COMPL W DOP COLOR FLOW  08/24/2013         ENDOSCOPY, COLON, DIAGNOSTIC      KNEE ARTHROSCOPY Right 2014    KNEE ARTHROSCOPY Right 04/01/2016    Arthroscopy right knee chondroplasty synovectomy medial and lateral menisectomy    KNEE ARTHROSCOPY Left 12/16/2016    left knee arthoscopy medial meniscectomy chodroplasty synovectomy    KNEE ARTHROSCOPY Right 07/10/2020    RIGHT KNEE ARTHROSCOPY, MEDIAL MENISCECTOMY AND DEBRIDEMENT performed by Linette Lee DO at [facility] ARTHROSCOPY Left 3/11/2022    Left knee arthroscopy and debridement performed by Linette Lee DO at [address] Right 5/26/2021    EXCISION SOFT TISSUE NEOPLASM RIGTH LEG performed by Palma Tate MD at [address] Bilateral 12/06/2016

## 2023-11-09 ENCOUNTER — OFFICE VISIT (OUTPATIENT)
Dept: ONCOLOGY | Age: 43
End: 2023-11-09
Payer: COMMERCIAL

## 2023-11-09 ENCOUNTER — HOSPITAL ENCOUNTER (OUTPATIENT)
Dept: INFUSION THERAPY | Age: 43
Discharge: HOME | End: 2023-11-09
Payer: COMMERCIAL

## 2023-11-09 VITALS
WEIGHT: 208.3 LBS | SYSTOLIC BLOOD PRESSURE: 124 MMHG | DIASTOLIC BLOOD PRESSURE: 91 MMHG | BODY MASS INDEX: 36.91 KG/M2 | TEMPERATURE: 98.9 F | HEIGHT: 63 IN | OXYGEN SATURATION: 98 % | HEART RATE: 88 BPM

## 2023-11-09 DIAGNOSIS — D72.818 OTHER DECREASED WHITE BLOOD CELL (WBC) COUNT: ICD-10-CM

## 2023-11-09 DIAGNOSIS — D64.9 ANEMIA, UNSPECIFIED TYPE: Primary | ICD-10-CM

## 2023-11-09 DIAGNOSIS — D64.9 ANEMIA, UNSPECIFIED TYPE: ICD-10-CM

## 2023-11-09 LAB
BASOPHILS # BLD: 0.04 K/UL (ref 0–0.2)
BASOPHILS NFR BLD: 1 % (ref 0–2)
EOSINOPHIL # BLD: 0.08 K/UL (ref 0.05–0.5)
EOSINOPHILS RELATIVE PERCENT: 1 % (ref 0–6)
ERYTHROCYTE [DISTWIDTH] IN BLOOD BY AUTOMATED COUNT: 12 % (ref 11.5–15)
FERRITIN SERPL-MCNC: 73 NG/ML
FOLATE SERPL-MCNC: >20 NG/ML (ref 4.8–24.2)
HCT VFR BLD AUTO: 48.3 % (ref 34–48)
HGB BLD-MCNC: 15.5 G/DL (ref 11.5–15.5)
IMM GRANULOCYTES # BLD AUTO: 0.03 K/UL (ref 0–0.58)
IMM GRANULOCYTES NFR BLD: 0 % (ref 0–5)
IRON SATN MFR SERPL: 32 % (ref 15–50)
IRON SERPL-MCNC: 112 UG/DL (ref 37–145)
LYMPHOCYTES NFR BLD: 1.13 K/UL (ref 1.5–4)
LYMPHOCYTES RELATIVE PERCENT: 17 % (ref 20–42)
MCH RBC QN AUTO: 32.2 PG (ref 26–35)
MCHC RBC AUTO-ENTMCNC: 32.1 G/DL (ref 32–34.5)
MCV RBC AUTO: 100.4 FL (ref 80–99.9)
MONOCYTES NFR BLD: 0.25 K/UL (ref 0.1–0.95)
MONOCYTES NFR BLD: 4 % (ref 2–12)
NEUTROPHILS NFR BLD: 77 % (ref 43–80)
NEUTS SEG NFR BLD: 5.16 K/UL (ref 1.8–7.3)
PLATELET # BLD AUTO: 251 K/UL (ref 130–450)
PMV BLD AUTO: 11.6 FL (ref 7–12)
RBC # BLD AUTO: 4.81 M/UL (ref 3.5–5.5)
TIBC SERPL-MCNC: 350 UG/DL (ref 250–450)
VIT B12 SERPL-MCNC: 803 PG/ML (ref 211–946)
WBC OTHER # BLD: 6.7 K/UL (ref 4.5–11.5)

## 2023-11-09 PROCEDURE — 3074F SYST BP LT 130 MM HG: CPT | Performed by: INTERNAL MEDICINE

## 2023-11-09 PROCEDURE — 82607 VITAMIN B-12: CPT

## 2023-11-09 PROCEDURE — 84155 ASSAY OF PROTEIN SERUM: CPT

## 2023-11-09 PROCEDURE — 85025 COMPLETE CBC W/AUTO DIFF WBC: CPT

## 2023-11-09 PROCEDURE — 83540 ASSAY OF IRON: CPT

## 2023-11-09 PROCEDURE — 82746 ASSAY OF FOLIC ACID SERUM: CPT

## 2023-11-09 PROCEDURE — G8484 FLU IMMUNIZE NO ADMIN: HCPCS | Performed by: INTERNAL MEDICINE

## 2023-11-09 PROCEDURE — G8417 CALC BMI ABV UP PARAM F/U: HCPCS | Performed by: INTERNAL MEDICINE

## 2023-11-09 PROCEDURE — 84165 PROTEIN E-PHORESIS SERUM: CPT

## 2023-11-09 PROCEDURE — G8427 DOCREV CUR MEDS BY ELIG CLIN: HCPCS | Performed by: INTERNAL MEDICINE

## 2023-11-09 PROCEDURE — 36415 COLL VENOUS BLD VENIPUNCTURE: CPT

## 2023-11-09 PROCEDURE — 1036F TOBACCO NON-USER: CPT | Performed by: INTERNAL MEDICINE

## 2023-11-09 PROCEDURE — 3078F DIAST BP <80 MM HG: CPT | Performed by: INTERNAL MEDICINE

## 2023-11-09 PROCEDURE — 99213 OFFICE O/P EST LOW 20 MIN: CPT | Performed by: INTERNAL MEDICINE

## 2023-11-09 PROCEDURE — 83550 IRON BINDING TEST: CPT

## 2023-11-09 PROCEDURE — 82728 ASSAY OF FERRITIN: CPT

## 2023-11-09 NOTE — PROGRESS NOTES
anemia, likely secondary to heavy menstrual bleeding, she has not been responding to the oral iron, also had GI side effects from it, recommended parenteral iron infusion as she was symptomatic, discussed with the patient Feraheme x2 doses 1 week apart, the side effects were reviewed with the patient, The patient received the first dose of Feraheme on 1/6/2022, she had nausea, was medicated with Zofran with the second dose,  FIT test is negative, GI w/up in 2019 was neg for bleeding. The patient is doing well clinically at this time, no bleeding, her CBCD from August was remarkable for leukopenia, and a positive white count 4.2, macrocytosis, .1, normal hemoglobin and hematocrit, will repeat her CBC today, along with iron panel, will also order a work-up to the leukopenia, and macrocytosis, will repeat vit B12 and folate, she will have SPEP done, review her smear,. She was ordered, we will check iron panel, we will monitor her counts, she received parenteral infusion of recurrent iron deficiency . RTC in 6 months. Thank you for allowing us to participate in the care of Mrs. Maxine Solorio.     Raul Mittal MD   HEMATOLOGY/MEDICAL [place] [address]  Dept: [address]: [phone]

## 2023-11-10 LAB
ALBUMIN SERPL-MCNC: 3.9 G/DL (ref 3.5–4.7)
ALPHA1 GLOB SERPL ELPH-MCNC: 0.3 G/DL (ref 0.2–0.4)
ALPHA2 GLOB SERPL ELPH-MCNC: 1 G/DL (ref 0.5–1)
B-GLOBULIN SERPL ELPH-MCNC: 1.1 G/DL (ref 0.8–1.3)
GAMMA GLOB SERPL ELPH-MCNC: 1.3 G/DL (ref 0.7–1.6)
PATH REV BLD -IMP: NORMAL
PATHOLOGIST: NORMAL
PROT PATTERN SERPL ELPH-IMP: NORMAL
PROT SERPL-MCNC: 7.5 G/DL (ref 6.4–8.3)

## 2023-11-12 LAB
SEND OUT REPORT: NORMAL
TEST NAME: NORMAL

## 2023-11-27 ENCOUNTER — CLINICAL DOCUMENTATION (OUTPATIENT)
Dept: NEUROLOGY | Age: 43
End: 2023-11-27

## 2023-11-27 ENCOUNTER — OFFICE VISIT (OUTPATIENT)
Dept: NEUROLOGY | Age: 43
End: 2023-11-27
Payer: COMMERCIAL

## 2023-11-27 VITALS
DIASTOLIC BLOOD PRESSURE: 84 MMHG | OXYGEN SATURATION: 99 % | HEIGHT: 63 IN | BODY MASS INDEX: 36.86 KG/M2 | HEART RATE: 91 BPM | TEMPERATURE: 98 F | RESPIRATION RATE: 16 BRPM | SYSTOLIC BLOOD PRESSURE: 135 MMHG | WEIGHT: 208 LBS

## 2023-11-27 DIAGNOSIS — G43.709 CHRONIC MIGRAINE WITHOUT AURA WITHOUT STATUS MIGRAINOSUS, NOT INTRACTABLE: Primary | ICD-10-CM

## 2023-11-27 PROCEDURE — 64615 CHEMODENERV MUSC MIGRAINE: CPT | Performed by: NURSE PRACTITIONER

## 2023-11-27 NOTE — PROGRESS NOTES
Botulinum Toxin Injection Procedure Note    Pre-procedure Diagnosis: Chronic migraine  Auth good until July 21, 2024    Indications: same    Procedure Details   The risks, benefits, indications, potential complications, and alternatives were explained to the patient and informed consent obtained. The following 31 injection sites were injected with botulinum toxin:    Bilateral Corrugators: 5 units each side  Procerus: 5 units  Bilateral frontalis: 10 units each side  Bilateral temporalis: 20 units each side  Bilateral occipitalis: 15 units each side  Bilateral cervical paraspinals: 10 units each side  Bilateral trapezius: 15 units each side    Total: 155 units      Botulinum toxin Lot #: [id number]  Botulinum toxin expiration date: 4/2026  Total botox units injected: 155 units  Total botox units wasted: 45 units    The patient tolerated the procedure well. Plan:    Call with any facial muscle weakness, ptosis, neck weakness, difficulty swallowing, difficulty speaking, or diffculty breathing    Keep a careful HA diary    Return to clinic for repeat botulinum injection in 3 months.      Prince Ralf APRN - CNP-AQH  11:48 AM

## 2023-11-27 NOTE — PROGRESS NOTES
Patient was here for routine Botox injections for migraines. She mentioned concern about development of Alzheimer's dementia  due to her AD in her mother and maternal grandmother. Her mother was diagnosed in her 62s. At this time the patient has no concerning symptoms for early Alzheimer's disease, but we will certainly monitor for any evidence of executive dysfunction or cognitive impairment in the future and perform the appropriate testing. Educated her on s/s as well as preventative brain health strategies.  If she desires any biomarker or genetic testing for Alzheimer's disease risk, I would be happy to refer her to [facility]

## 2024-01-09 ENCOUNTER — HOSPITAL ENCOUNTER (EMERGENCY)
Age: 44
Discharge: HOME | End: 2024-01-09
Attending: FAMILY MEDICINE
Payer: COMMERCIAL

## 2024-01-09 VITALS
TEMPERATURE: 97.4 F | HEART RATE: 74 BPM | DIASTOLIC BLOOD PRESSURE: 96 MMHG | SYSTOLIC BLOOD PRESSURE: 118 MMHG | OXYGEN SATURATION: 99 % | RESPIRATION RATE: 16 BRPM

## 2024-01-09 DIAGNOSIS — J06.9 ACUTE UPPER RESPIRATORY INFECTION: Primary | ICD-10-CM

## 2024-01-09 PROCEDURE — 99283 EMERGENCY DEPT VISIT LOW MDM: CPT

## 2024-01-09 RX ORDER — AMOXICILLIN 500 MG/1
500 CAPSULE ORAL 3 TIMES DAILY
Qty: 30 CAPSULE | Refills: 0 | Status: SHIPPED | OUTPATIENT
Start: 2024-01-09 | End: 2024-01-19

## 2024-01-09 RX ORDER — ECHINACEA PURPUREA EXTRACT 125 MG
1 TABLET ORAL PRN
Qty: 88 ML | Refills: 0 | Status: SHIPPED | OUTPATIENT
Start: 2024-01-09

## 2024-01-09 RX ORDER — DEXTROMETHORPHAN HYDROBROMIDE AND PROMETHAZINE HYDROCHLORIDE 15; 6.25 MG/5ML; MG/5ML
5 SYRUP ORAL 4 TIMES DAILY PRN
Qty: 118 ML | Refills: 0 | Status: SHIPPED | OUTPATIENT
Start: 2024-01-09 | End: 2024-01-16

## 2024-01-09 ASSESSMENT — PAIN DESCRIPTION - DESCRIPTORS: DESCRIPTORS: PRESSURE

## 2024-01-09 ASSESSMENT — PAIN - FUNCTIONAL ASSESSMENT: PAIN_FUNCTIONAL_ASSESSMENT: 0-10

## 2024-01-09 ASSESSMENT — PAIN SCALES - GENERAL: PAINLEVEL_OUTOF10: 6

## 2024-01-09 ASSESSMENT — PAIN DESCRIPTION - LOCATION: LOCATION: EAR;FACE

## 2024-01-09 NOTE — ED PROVIDER NOTES
all extremities x 4. Warm and well perfused  Skin: warm and dry without rash  Neurologic: GCS 15,  Psych: Normal Affect      ------------------------------ ED COURSE/MEDICAL DECISION MAKING----------------------  Medications - No data to display      Medical Decision Making:    Simple    Counseling:   The emergency provider has spoken with the patient and discussed today’s results, in addition to providing specific details for the plan of care and counseling regarding the diagnosis and prognosis.  Questions are answered at this time and they are agreeable with the plan.      --------------------------------- IMPRESSION AND DISPOSITION ---------------------------------    IMPRESSION  1. Acute upper respiratory infection        DISPOSITION  Disposition: Discharge to home  Patient condition is stable                 Alex Celeste MD  01/09/24 [id number]

## 2024-02-08 ENCOUNTER — HOSPITAL ENCOUNTER (OUTPATIENT)
Dept: MAMMOGRAPHY | Age: 44
Discharge: HOME | End: 2024-02-10
Payer: COMMERCIAL

## 2024-02-08 DIAGNOSIS — M85.9 DISORDER OF BONE DENSITY AND STRUCTURE, UNSPECIFIED: ICD-10-CM

## 2024-02-08 PROCEDURE — 77080 DXA BONE DENSITY AXIAL: CPT

## 2024-02-19 ENCOUNTER — ANESTHESIA EVENT (OUTPATIENT)
Dept: OPERATING ROOM | Age: 44
End: 2024-02-19
Payer: COMMERCIAL

## 2024-02-22 ASSESSMENT — LIFESTYLE VARIABLES: SMOKING_STATUS: 0

## 2024-02-22 NOTE — ANESTHESIA PRE PROCEDURE
Department of Anesthesiology  Preprocedure Note       Name:  Monica De La O   Age:  43 y.o.  :  1980                                          MRN:  [id number]         Date:  2024      Surgeon: Surgeon(s):  Alex Joyner Jr., SLADE    Procedure: Procedure(s):  MANZANARES CALCANEAL OSTEOTOMY RIGHT REPAIR PT WITH FDL AND SPRING LIGAMENT RETRO HEEL SPUR RIGHT FOOT (CPT 62355, 96499, 13925)    Medications prior to admission:       Current medications:          Allergies:    Allergies   Allergen Reactions    Aspirin Anaphylaxis, Nausea Only and Other (See Comments)     Other reaction(s): Other (See Comments)  Heart rate goes up    Mobic [Meloxicam] Other (See Comments)     \"Almost a stroke, couldn't talk, right side numbness\"    Other      STATES ALL STEROIDS  cause  Migraine,.rash.& difficulty breathing    Prednisone Other (See Comments)    Cortisone Hives, Nausea And Vomiting, Other (See Comments) and Rash      heart rate goes up, massive migraines facial swelling      Darvocet A500 [Propoxyphene N-Acetaminophen] Hives, Nausea And Vomiting and Other (See Comments)     Massive migraines    Folic Acid Hives    Gabapentin Other (See Comments)     Dizziness    Propoxyphene Hives and Other (See Comments)     hives    Tape [Adhesive Tape] Dermatitis     \"skin spots\"       Problem List:    Patient Active Problem List   Diagnosis Code    Asthma J45.909    Migraines G43.909    SLE (systemic lupus erythematosus) ([facility]) M32.9    Primary osteoarthritis of right knee M17.11    Primary osteoarthritis of left knee M17.12    Displacement of lumbar intervertebral disc M51.26    Neural foraminal stenosis of lumbar spine M48.061    Lumbar facet arthropathy M47.816    Morbid obesity ([facility]) E66.01    Essential hypertension I10    Type 2 diabetes mellitus without complication, without long-term current use of insulin ([facility]) E11.9    Pure hypercholesterolemia E78.00    Anemia D64.9       Past Medical History:        Diagnosis Date 
right knee chondroplasty synovectomy medial and lateral menisectomy    KNEE ARTHROSCOPY Left 12/16/2016    left knee arthoscopy medial meniscectomy chodroplasty synovectomy    KNEE ARTHROSCOPY Right 07/10/2020    RIGHT KNEE ARTHROSCOPY, MEDIAL MENISCECTOMY AND DEBRIDEMENT performed by Jeison Smyth DO at [facility]    KNEE ARTHROSCOPY Left 3/11/2022    Left knee arthroscopy and debridement performed by Jeison Smyth DO at [facility] OR    LEG BIOPSY EXCISION Right 5/26/2021    EXCISION SOFT TISSUE NEOPLASM RIGTH LEG performed by Stiven Bailey MD at [facility] OR    NERVE BLOCK Bilateral 12/06/2016    lumbar facet #1    OTHER SURGICAL HISTORY      partial rt  fallopian tube removed    RI SURGICAL ARTHROSCOPY JUMA W/CORACOACRM LIGM RLS Left 04/12/2018    LEFT SHOULDER ARTHROSCOPY, SUBACROMIAL DECOMPRESSION AND DEBRIDEMENT performed by Jeison Smyth DO at [facility] OR    SHOULDER ARTHROSCOPY Right 01/03/2020    RIGHT SHOULDER ARTHROSCOPY, SUBACROMIAL DECOMPRESSION AND DEBRIDEMENT LABRIUM performed by Jeison Smyth DO at [facility] OR    TONSILLECTOMY      TOTAL KNEE ARTHROPLASTY Right 11/30/2022    RIGHT KNEE PATELLOFEMORAL ARTHROPLASTY VS TOTAL KNEE ARTHROPLASTY- 11/30/22 ARTHROSURFACE AND SMITH AND NEPHEW performed by Jeisno Smyth DO at [facility] OR    TUBAL LIGATION         Social History:    Social History     Tobacco Use    Smoking status: Never    Smokeless tobacco: Never   Substance Use Topics    Alcohol use: No                                Counseling given: Not Answered      Vital Signs (Current):   Vitals:    02/19/24 1007   Weight: 92.5 kg (204 lb)   Height: 1.6 m (5' 3\")                                              BP Readings from Last 3 Encounters:   01/09/24 (!) 118/96   11/27/23 135/84   11/09/23 (!) 124/91       NPO Status:  >8.H                                                                               BMI:   Wt Readings from Last 3 Encounters:   11/27/23 94.3 kg (208 lb)

## 2024-02-23 ENCOUNTER — ANESTHESIA (OUTPATIENT)
Dept: OPERATING ROOM | Age: 44
End: 2024-02-23
Payer: COMMERCIAL

## 2024-02-23 ENCOUNTER — HOSPITAL ENCOUNTER (OUTPATIENT)
Age: 44
Setting detail: OUTPATIENT SURGERY
Discharge: HOME | End: 2024-02-23
Attending: PODIATRIST | Admitting: PODIATRIST
Payer: COMMERCIAL

## 2024-02-23 ENCOUNTER — HOSPITAL ENCOUNTER (OUTPATIENT)
Dept: OPERATING ROOM | Age: 44
Setting detail: OUTPATIENT SURGERY
Discharge: HOME | End: 2024-02-23
Attending: PODIATRIST
Payer: COMMERCIAL

## 2024-02-23 VITALS
HEIGHT: 63 IN | WEIGHT: 207 LBS | DIASTOLIC BLOOD PRESSURE: 67 MMHG | RESPIRATION RATE: 16 BRPM | TEMPERATURE: 97.1 F | OXYGEN SATURATION: 97 % | BODY MASS INDEX: 36.68 KG/M2 | SYSTOLIC BLOOD PRESSURE: 110 MMHG | HEART RATE: 71 BPM

## 2024-02-23 DIAGNOSIS — E66.01 MORBID OBESITY (HCC): ICD-10-CM

## 2024-02-23 DIAGNOSIS — M21.6X1 ACQUIRED EXTERNAL ROTATION OF FOOT, RIGHT: Primary | ICD-10-CM

## 2024-02-23 DIAGNOSIS — G89.18 POST PROCEDURE DISCOMFORT: ICD-10-CM

## 2024-02-23 DIAGNOSIS — M21.6X1: ICD-10-CM

## 2024-02-23 PROBLEM — S96.011A: Status: ACTIVE | Noted: 2024-02-23

## 2024-02-23 LAB
GLUCOSE BLD-MCNC: 89 MG/DL (ref 74–99)
HCG, URINE, POC: NEGATIVE
Lab: NORMAL
NEGATIVE QC PASS/FAIL: NORMAL
POSITIVE QC PASS/FAIL: NORMAL

## 2024-02-23 PROCEDURE — 6360000002 HC RX W HCPCS: Performed by: ANESTHESIOLOGY

## 2024-02-23 PROCEDURE — 2580000003 HC RX 258: Performed by: ANESTHESIOLOGY

## 2024-02-23 PROCEDURE — 6360000002 HC RX W HCPCS: Performed by: PODIATRIST

## 2024-02-23 PROCEDURE — 82962 GLUCOSE BLOOD TEST: CPT

## 2024-02-23 PROCEDURE — 6370000000 HC RX 637 (ALT 250 FOR IP): Performed by: ANESTHESIOLOGY

## 2024-02-23 PROCEDURE — 7100000001 HC PACU RECOVERY - ADDTL 15 MIN: Performed by: PODIATRIST

## 2024-02-23 PROCEDURE — 3700000000 HC ANESTHESIA ATTENDED CARE: Performed by: PODIATRIST

## 2024-02-23 PROCEDURE — 3600000015 HC SURGERY LEVEL 5 ADDTL 15MIN: Performed by: PODIATRIST

## 2024-02-23 PROCEDURE — 7100000010 HC PHASE II RECOVERY - FIRST 15 MIN: Performed by: PODIATRIST

## 2024-02-23 PROCEDURE — C1713 ANCHOR/SCREW BN/BN,TIS/BN: HCPCS | Performed by: PODIATRIST

## 2024-02-23 PROCEDURE — 2580000003 HC RX 258: Performed by: PODIATRIST

## 2024-02-23 PROCEDURE — 2500000003 HC RX 250 WO HCPCS: Performed by: NURSE ANESTHETIST, CERTIFIED REGISTERED

## 2024-02-23 PROCEDURE — 7100000011 HC PHASE II RECOVERY - ADDTL 15 MIN: Performed by: PODIATRIST

## 2024-02-23 PROCEDURE — 3700000001 HC ADD 15 MINUTES (ANESTHESIA): Performed by: PODIATRIST

## 2024-02-23 PROCEDURE — 2709999900 HC NON-CHARGEABLE SUPPLY: Performed by: PODIATRIST

## 2024-02-23 PROCEDURE — 6360000002 HC RX W HCPCS: Performed by: NURSE ANESTHETIST, CERTIFIED REGISTERED

## 2024-02-23 PROCEDURE — 3600000005 HC SURGERY LEVEL 5 BASE: Performed by: PODIATRIST

## 2024-02-23 PROCEDURE — 2720000010 HC SURG SUPPLY STERILE: Performed by: PODIATRIST

## 2024-02-23 PROCEDURE — 7100000000 HC PACU RECOVERY - FIRST 15 MIN: Performed by: PODIATRIST

## 2024-02-23 DEVICE — 3.5 X 28 MM R3CON LOCKING PLATE SCREW
Type: IMPLANTABLE DEVICE | Site: FOOT | Status: FUNCTIONAL
Brand: GORILLA PLATING SYSTEM

## 2024-02-23 DEVICE — 3.5 X 20 MM R3CON LOCKING PLATE SCREW
Type: IMPLANTABLE DEVICE | Site: FOOT | Status: FUNCTIONAL
Brand: GORILLA PLATING SYSTEM

## 2024-02-23 DEVICE — DX FIBERTAK SUTURE ANCHOR, ST & NDLS
Type: IMPLANTABLE DEVICE | Site: FOOT | Status: FUNCTIONAL
Brand: ARTHREX®

## 2024-02-23 DEVICE — 4.75MM BC KNOTLESS SWIVELOCK
Type: IMPLANTABLE DEVICE | Site: FOOT | Status: FUNCTIONAL
Brand: ARTHREX®

## 2024-02-23 DEVICE — DEVICE GRFT FIX FOR LIGMNT AUG ANCHR REP PEEK INT BRAC: Type: IMPLANTABLE DEVICE | Site: FOOT | Status: FUNCTIONAL

## 2024-02-23 DEVICE — GRAFT BNE WDG ADDUCTION STRUCTURAL EVANS 25DEG THICKNESS: Type: IMPLANTABLE DEVICE | Site: FOOT | Status: FUNCTIONAL

## 2024-02-23 DEVICE — HEVANS PLATE, MEDIUM, 20MM, RIGHT
Type: IMPLANTABLE DEVICE | Site: FOOT | Status: FUNCTIONAL
Brand: GORILLA PLATING SYSTEM

## 2024-02-23 DEVICE — SUTR ANCH,CRKSCRW FT,3.5X 10MM
Type: IMPLANTABLE DEVICE | Site: FOOT | Status: FUNCTIONAL
Brand: ARTHREX®

## 2024-02-23 RX ORDER — MIDAZOLAM HYDROCHLORIDE 1 MG/ML
INJECTION INTRAMUSCULAR; INTRAVENOUS PRN
Status: DISCONTINUED | OUTPATIENT
Start: 2024-02-23 | End: 2024-02-23 | Stop reason: SDUPTHER

## 2024-02-23 RX ORDER — MORPHINE SULFATE 2 MG/ML
1 INJECTION, SOLUTION INTRAMUSCULAR; INTRAVENOUS EVERY 5 MIN PRN
Status: DISCONTINUED | OUTPATIENT
Start: 2024-02-23 | End: 2024-02-23 | Stop reason: HOSPADM

## 2024-02-23 RX ORDER — ROCURONIUM BROMIDE 10 MG/ML
INJECTION, SOLUTION INTRAVENOUS PRN
Status: DISCONTINUED | OUTPATIENT
Start: 2024-02-23 | End: 2024-02-23 | Stop reason: SDUPTHER

## 2024-02-23 RX ORDER — SODIUM CHLORIDE 0.9 % (FLUSH) 0.9 %
5-40 SYRINGE (ML) INJECTION PRN
Status: DISCONTINUED | OUTPATIENT
Start: 2024-02-23 | End: 2024-02-23 | Stop reason: HOSPADM

## 2024-02-23 RX ORDER — ONDANSETRON 2 MG/ML
INJECTION INTRAMUSCULAR; INTRAVENOUS PRN
Status: DISCONTINUED | OUTPATIENT
Start: 2024-02-23 | End: 2024-02-23 | Stop reason: SDUPTHER

## 2024-02-23 RX ORDER — HYDROCODONE BITARTRATE AND ACETAMINOPHEN 5; 325 MG/1; MG/1
1 TABLET ORAL EVERY 4 HOURS PRN
Qty: 30 TABLET | Refills: 0 | Status: SHIPPED | OUTPATIENT
Start: 2024-02-23 | End: 2024-02-28

## 2024-02-23 RX ORDER — SODIUM CHLORIDE 0.9 % (FLUSH) 0.9 %
5-40 SYRINGE (ML) INJECTION EVERY 12 HOURS SCHEDULED
Status: DISCONTINUED | OUTPATIENT
Start: 2024-02-23 | End: 2024-02-23 | Stop reason: HOSPADM

## 2024-02-23 RX ORDER — SODIUM CHLORIDE 9 MG/ML
INJECTION, SOLUTION INTRAVENOUS PRN
Status: DISCONTINUED | OUTPATIENT
Start: 2024-02-23 | End: 2024-02-23 | Stop reason: HOSPADM

## 2024-02-23 RX ORDER — FENTANYL CITRATE 50 UG/ML
INJECTION, SOLUTION INTRAMUSCULAR; INTRAVENOUS PRN
Status: DISCONTINUED | OUTPATIENT
Start: 2024-02-23 | End: 2024-02-23 | Stop reason: SDUPTHER

## 2024-02-23 RX ORDER — BUPIVACAINE HYDROCHLORIDE 5 MG/ML
INJECTION, SOLUTION EPIDURAL; INTRACAUDAL PRN
Status: DISCONTINUED | OUTPATIENT
Start: 2024-02-23 | End: 2024-02-23 | Stop reason: ALTCHOICE

## 2024-02-23 RX ORDER — MEPERIDINE HYDROCHLORIDE 25 MG/ML
12.5 INJECTION INTRAMUSCULAR; INTRAVENOUS; SUBCUTANEOUS ONCE
Status: DISCONTINUED | OUTPATIENT
Start: 2024-02-23 | End: 2024-02-23 | Stop reason: HOSPADM

## 2024-02-23 RX ORDER — SODIUM CHLORIDE, SODIUM LACTATE, POTASSIUM CHLORIDE, CALCIUM CHLORIDE 600; 310; 30; 20 MG/100ML; MG/100ML; MG/100ML; MG/100ML
INJECTION, SOLUTION INTRAVENOUS CONTINUOUS
Status: DISCONTINUED | OUTPATIENT
Start: 2024-02-23 | End: 2024-02-23 | Stop reason: HOSPADM

## 2024-02-23 RX ORDER — GLYCOPYRROLATE 0.2 MG/ML
INJECTION INTRAMUSCULAR; INTRAVENOUS PRN
Status: DISCONTINUED | OUTPATIENT
Start: 2024-02-23 | End: 2024-02-23 | Stop reason: SDUPTHER

## 2024-02-23 RX ORDER — LIDOCAINE HYDROCHLORIDE 20 MG/ML
INJECTION, SOLUTION INTRAVENOUS PRN
Status: DISCONTINUED | OUTPATIENT
Start: 2024-02-23 | End: 2024-02-23 | Stop reason: SDUPTHER

## 2024-02-23 RX ORDER — EPHEDRINE SULFATE/0.9% NACL/PF 25 MG/5 ML
SYRINGE (ML) INTRAVENOUS PRN
Status: DISCONTINUED | OUTPATIENT
Start: 2024-02-23 | End: 2024-02-23 | Stop reason: SDUPTHER

## 2024-02-23 RX ORDER — NEOSTIGMINE METHYLSULFATE 1 MG/ML
INJECTION, SOLUTION INTRAVENOUS PRN
Status: DISCONTINUED | OUTPATIENT
Start: 2024-02-23 | End: 2024-02-23 | Stop reason: SDUPTHER

## 2024-02-23 RX ORDER — FAMOTIDINE 20 MG/1
20 TABLET, FILM COATED ORAL ONCE
Status: CANCELLED | OUTPATIENT
Start: 2024-02-23 | End: 2024-02-23

## 2024-02-23 RX ORDER — FENTANYL CITRATE 0.05 MG/ML
50 INJECTION, SOLUTION INTRAMUSCULAR; INTRAVENOUS EVERY 5 MIN PRN
Status: DISCONTINUED | OUTPATIENT
Start: 2024-02-23 | End: 2024-02-23 | Stop reason: HOSPADM

## 2024-02-23 RX ORDER — DEXAMETHASONE SODIUM PHOSPHATE 4 MG/ML
INJECTION, SOLUTION INTRA-ARTICULAR; INTRALESIONAL; INTRAMUSCULAR; INTRAVENOUS; SOFT TISSUE PRN
Status: DISCONTINUED | OUTPATIENT
Start: 2024-02-23 | End: 2024-02-23 | Stop reason: SDUPTHER

## 2024-02-23 RX ORDER — METOCLOPRAMIDE 5 MG/1
10 TABLET ORAL ONCE
Status: COMPLETED | OUTPATIENT
Start: 2024-02-23 | End: 2024-02-23

## 2024-02-23 RX ORDER — PROPOFOL 10 MG/ML
INJECTION, EMULSION INTRAVENOUS PRN
Status: DISCONTINUED | OUTPATIENT
Start: 2024-02-23 | End: 2024-02-23 | Stop reason: SDUPTHER

## 2024-02-23 RX ORDER — TRAMADOL HYDROCHLORIDE 50 MG/1
50 TABLET ORAL EVERY 6 HOURS PRN
Status: DISCONTINUED | OUTPATIENT
Start: 2024-02-23 | End: 2024-02-23 | Stop reason: HOSPADM

## 2024-02-23 RX ORDER — DIPHENHYDRAMINE HYDROCHLORIDE 50 MG/ML
12.5 INJECTION INTRAMUSCULAR; INTRAVENOUS
Status: DISCONTINUED | OUTPATIENT
Start: 2024-02-23 | End: 2024-02-23 | Stop reason: HOSPADM

## 2024-02-23 RX ORDER — HYDROMORPHONE HYDROCHLORIDE 1 MG/ML
0.25 INJECTION, SOLUTION INTRAMUSCULAR; INTRAVENOUS; SUBCUTANEOUS EVERY 30 MIN PRN
Status: DISCONTINUED | OUTPATIENT
Start: 2024-02-23 | End: 2024-02-23 | Stop reason: HOSPADM

## 2024-02-23 RX ORDER — DROPERIDOL 2.5 MG/ML
1.25 INJECTION, SOLUTION INTRAMUSCULAR; INTRAVENOUS
Status: DISCONTINUED | OUTPATIENT
Start: 2024-02-23 | End: 2024-02-23 | Stop reason: HOSPADM

## 2024-02-23 RX ADMIN — FENTANYL CITRATE 50 MCG: 50 INJECTION INTRAMUSCULAR; INTRAVENOUS at 09:20

## 2024-02-23 RX ADMIN — PROPOFOL 150 MG: 10 INJECTION, EMULSION INTRAVENOUS at 08:23

## 2024-02-23 RX ADMIN — Medication 3 MG: at 10:32

## 2024-02-23 RX ADMIN — CEFAZOLIN 2000 MG: 2 INJECTION, POWDER, FOR SOLUTION INTRAMUSCULAR; INTRAVENOUS at 08:19

## 2024-02-23 RX ADMIN — MIDAZOLAM 2 MG: 1 INJECTION INTRAMUSCULAR; INTRAVENOUS at 08:16

## 2024-02-23 RX ADMIN — METOCLOPRAMIDE 10 MG: 5 TABLET ORAL at 07:53

## 2024-02-23 RX ADMIN — FENTANYL CITRATE 50 MCG: 50 INJECTION INTRAMUSCULAR; INTRAVENOUS at 09:46

## 2024-02-23 RX ADMIN — DEXAMETHASONE SODIUM PHOSPHATE 4 MG: 4 INJECTION INTRA-ARTICULAR; INTRALESIONAL; INTRAMUSCULAR; INTRAVENOUS; SOFT TISSUE at 08:29

## 2024-02-23 RX ADMIN — SODIUM CHLORIDE, POTASSIUM CHLORIDE, SODIUM LACTATE AND CALCIUM CHLORIDE: 600; 310; 30; 20 INJECTION, SOLUTION INTRAVENOUS at 07:48

## 2024-02-23 RX ADMIN — FENTANYL CITRATE 50 MCG: 50 INJECTION INTRAMUSCULAR; INTRAVENOUS at 08:42

## 2024-02-23 RX ADMIN — EPHEDRINE SULFATE 5 MG: 5 INJECTION INTRAVENOUS at 09:02

## 2024-02-23 RX ADMIN — TRAMADOL HYDROCHLORIDE 50 MG: 50 TABLET ORAL at 11:47

## 2024-02-23 RX ADMIN — EPHEDRINE SULFATE 5 MG: 5 INJECTION INTRAVENOUS at 09:49

## 2024-02-23 RX ADMIN — ROCURONIUM BROMIDE 40 MG: 10 INJECTION, SOLUTION INTRAVENOUS at 08:23

## 2024-02-23 RX ADMIN — HYDROMORPHONE HYDROCHLORIDE 0.25 MG: 1 INJECTION, SOLUTION INTRAMUSCULAR; INTRAVENOUS; SUBCUTANEOUS at 11:13

## 2024-02-23 RX ADMIN — PROPOFOL 50 MG: 10 INJECTION, EMULSION INTRAVENOUS at 10:43

## 2024-02-23 RX ADMIN — GLYCOPYRROLATE 0.6 MG: 0.2 INJECTION INTRAMUSCULAR; INTRAVENOUS at 10:32

## 2024-02-23 RX ADMIN — SODIUM CHLORIDE, POTASSIUM CHLORIDE, SODIUM LACTATE AND CALCIUM CHLORIDE: 600; 310; 30; 20 INJECTION, SOLUTION INTRAVENOUS at 09:48

## 2024-02-23 RX ADMIN — FENTANYL CITRATE 50 MCG: 50 INJECTION INTRAMUSCULAR; INTRAVENOUS at 08:23

## 2024-02-23 RX ADMIN — EPHEDRINE SULFATE 10 MG: 5 INJECTION INTRAVENOUS at 09:08

## 2024-02-23 RX ADMIN — LIDOCAINE HYDROCHLORIDE 100 MG: 20 INJECTION, SOLUTION INTRAVENOUS at 08:23

## 2024-02-23 RX ADMIN — ONDANSETRON 4 MG: 2 INJECTION INTRAMUSCULAR; INTRAVENOUS at 10:31

## 2024-02-23 RX ADMIN — EPHEDRINE SULFATE 5 MG: 5 INJECTION INTRAVENOUS at 08:59

## 2024-02-23 ASSESSMENT — PAIN - FUNCTIONAL ASSESSMENT
PAIN_FUNCTIONAL_ASSESSMENT: 0-10

## 2024-02-23 ASSESSMENT — PAIN DESCRIPTION - ORIENTATION
ORIENTATION: RIGHT
ORIENTATION: RIGHT

## 2024-02-23 ASSESSMENT — PAIN DESCRIPTION - LOCATION
LOCATION: FOOT
LOCATION: FOOT

## 2024-02-23 ASSESSMENT — PAIN SCALES - GENERAL
PAINLEVEL_OUTOF10: 9
PAINLEVEL_OUTOF10: 7

## 2024-02-23 ASSESSMENT — PAIN DESCRIPTION - DESCRIPTORS
DESCRIPTORS: THROBBING
DESCRIPTORS: ACHING

## 2024-02-23 NOTE — DISCHARGE INSTRUCTIONS
General Post-Op Instructions  ABDIFATAH Lehman Jr., D.P.M.  [phone]    Post Operative Footcare Instructions:    1.  Keep your dressings clean and dry.  A clean dressing promotes healthy healing.  2.   DO NOT remove the dressing from your foot/ankle, unless instructed by your doctor.  3.   Elevate your foot above the level of your heart for the first 3-4 days as much as possible.  4.   Place ice on top of your foot/ankle 15 min. every waking hour for the first 3-4 days.  5.   Take your pain medication, with food,as prescribed.              (follow all instructions regarding prescription medications)  6.   Wear your surgical shoe at all times, and even to bed the first 3-4 days post-op.  7.   DO NOT take a shower and get your foot wet.  8.   You may take a sponge bath or hang you foot over the side of the bath tub.  9.  Take your temperature daily each morning, and call the doctor if your temp. is higher than                            102.5.  10.  You may not walk on your foot   11.  Resume normal diet and medications (unless otherwise instructed by your doctor).  12.  No driving until approved by Dr. Lehman  13. You should have a responsible adult with you for at 24 hrs.    ************************************************************************************    1.   Call the office at [phone] to make an appointment.  2.   Your first post-op appointment should be made tomorrow for within 1 week of the surgery.  3.  To contact the doctor, you may do so by calling [phone] or [phone].   4.   Take it easy for the next few days and remember, “If you are good to your foot, it will be                                    good to you.”        If any problems occur or if you have any further questions, please call your doctor as soon as possible. If you find that you cannot reach your doctor but feel that your condition needs a doctor’s attention go to an emergency room.                   Nausea and Vomiting  treatment and safety. Be sure to make and go to all appointments, and call your doctor if you are having problems. It's also a good idea to know your test results and keep a list of the medicines you take.  How can you care for yourself at home?  Make sure your surgeon knows about the infection, especially if you saw another doctor about your symptoms.  If your doctor prescribed antibiotics, take them as directed. Do not stop taking them just because you feel better. You need to take the full course of antibiotics.  Ask your doctor if you can take an over-the-counter pain medicine, such as acetaminophen (Tylenol), ibuprofen (Advil, Motrin), or naproxen (Aleve). Be safe with medicines. Read and follow all instructions on the label.  Do not take two or more pain medicines at the same time unless the doctor told you to. Many pain medicines have acetaminophen, which is Tylenol. Too much acetaminophen (Tylenol) can be harmful.  Prop up the area on a pillow anytime you sit or lie down during the next 3 days. Try to keep it above the level of your heart. This will help reduce swelling.  Keep the skin clean and dry.  You may have a bandage over the cut (incision). A bandage helps the incision heal and protects it. Your doctor will tell you how to take care of this. Keep it clean and dry. You may have drainage from the wound.  If your doctor told you how to care for your incision, follow your doctor's instructions. If you did not get instructions, follow this general advice:  Wash around the incision with clean water 2 times a day. Don't use hydrogen peroxide or alcohol, which can slow healing.  When should you call for help?   Call your doctor now or seek immediate medical care if:    You have signs that your infection is getting worse, such as:  Increased pain, swelling, warmth, or redness in the area.  Red streaks leading from the area.  Pus draining from the wound.  A new or higher fever.   Watch closely for changes in

## 2024-02-23 NOTE — OP NOTE
Operative Note      Patient: Monica De La O  YOB: 1980  MRN: [id number]    Date of Procedure: 2/23/2024    Pre-Op Diagnosis Codes:     * Acquired external rotation of foot, right [M21.6X1]     * Strain of part of long flexor muscle of toe at right foot level [S96.011A]    Post-Op Diagnosis: Same       Procedure(s):  MANZANARES CALCANEAL OSTEOTOMY RIGHT REPAIR PT WITH FDL AND SPRING LIGAMENT RETRO HEEL SPUR RIGHT FOOT (CPT 34422, 29323, 51536)KIDNER with Posterior tibial tendon repair W/OUT FDL transfer    Surgeon(s):  Alex Joyner Jr., DPM    Assistant:   * No surgical staff found *    Anesthesia: General    Estimated Blood Loss (mL): less than 50     Complications: None    Specimens:   * No specimens in log *    Implants:  Implant Name Type Inv. Item Serial No.  Lot No. LRB No. Used Action   ANCHOR BIOCOMPOSITE KNOTLESS SL  4.75X19.1MM W/#2 BL SUTURE - [id number]  ANCHOR BIOCOMPOSITE KNOTLESS SL  4.75X19.1MM W/#2 BL SUTURE  [employer] [id number] Right 1 Implanted   DEVICE GRFT FIX FOR LIGMNT AUG ANCHR REP PEEK INT BRAC - [id number]  DEVICE GRFT FIX FOR LIGMNT AUG ANCHR REP PEEK INT BRAC  [employer] [id number] Right 1 Implanted   ANCHOR SUT L10MM DIA3.5MM SUT SZ 0 TI W/ NDL CRKSCR FT - [id number]  ANCHOR SUT L10MM DIA3.5MM SUT SZ 0 TI W/ NDL CRKSCR FT  [employer] [id number] Right 1 Implanted   GRAFT BNE WDG ADDUCTION STRUCTURAL MANZANARES 25DEG THICKNESS - [id number]  GRAFT BNE WDG ADDUCTION STRUCTURAL MANZANARES 25DEG THICKNESS  [employer]AG[employer] 28-WD  Right 1 Implanted   PLATE BNE MED 20 MM RT GORILLA HEVANS - [id number]  PLATE BNE MED 20 MM RT GORILLA HEVANS  [employer] 28-WD  Right 1 Implanted   SCREW BNE L28MM DIA3.5MM YANNI FOR R3CON PLATING SYS GORILLA - [id number]  SCREW BNE L28MM DIA3.5MM YANNI FOR R3CON PLATING SYS GORILLA  [employer] 28-WD  Right 2 Implanted   SCREW BNE L20MM DIA3.5MM YANNI FOR R3CON PLATING SYS OTF - [id number]  SCREW BNE L20MM DIA3.5MM YANNI FOR R3CON PLATING SYS OTF ORTEGA

## 2024-02-23 NOTE — ANESTHESIA POSTPROCEDURE EVALUATION
Department of Anesthesiology  Postprocedure Note    Patient: Monica De La O  MRN: [id number]  YOB: 1980  Date of evaluation: 2/23/2024    Procedure Summary       Date: 02/23/24 Room / Location: [address]    Anesthesia Start: 0814 Anesthesia Stop: 1100    Procedure: MANZANARES CALCANEAL OSTEOTOMY RIGHT REPAIR PT WITH FDL AND SPRING LIGAMENT RETRO HEEL SPUR RIGHT FOOT (CPT 77337, 25154, 38480) (Right) Diagnosis:       Acquired external rotation of foot, right      Strain of part of long flexor muscle of toe at right foot level      (Acquired external rotation of foot, right [M21.6X1])      (Strain of part of long flexor muscle of toe at right foot level [S96.011A])    Surgeons: Alex Joyner Jr., SLADE Responsible Provider: Patrick Valero MD    Anesthesia Type: general ASA Status: 2            Anesthesia Type: No value filed.    Peter Phase I: Peter Score: 10    Peter Phase II: Peter Score: 10    Anesthesia Post Evaluation    Patient location during evaluation: PACU  Patient participation: complete - patient participated  Level of consciousness: awake  Airway patency: patent  Nausea & Vomiting: no nausea and no vomiting  Cardiovascular status: hemodynamically stable  Respiratory status: room air and spontaneous ventilation  Hydration status: stable  Pain management: adequate (required pain Rx in PACU)    No notable events documented.

## 2024-02-23 NOTE — H&P
Update History & Physical    The patient's History and Physical of 2 / 23 / 24 was reviewed with the patient and there were no significant changes.    I examined the patient and there were no significant changes from the previous History and Physical.    Height 1.6 m (5' 3\"), weight 92.5 kg (204 lb), last menstrual period 01/22/2024, not currently breastfeeding.     Plan: The risk, benefits, expected outcome, and alternative to the recommended procedure have been discussed with the patient.  Patient understands and wants to proceed with the procedure.    Electronically signed by Alex Lehman Jr, DPM on 2/23/24 at 7:13 AM EST

## 2024-05-09 ENCOUNTER — HOSPITAL ENCOUNTER (OUTPATIENT)
Dept: INFUSION THERAPY | Age: 44
Discharge: HOME | End: 2024-05-09
Payer: COMMERCIAL

## 2024-05-09 ENCOUNTER — OFFICE VISIT (OUTPATIENT)
Dept: ONCOLOGY | Age: 44
End: 2024-05-09
Payer: COMMERCIAL

## 2024-05-09 VITALS
TEMPERATURE: 99.4 F | DIASTOLIC BLOOD PRESSURE: 95 MMHG | BODY MASS INDEX: 38.46 KG/M2 | WEIGHT: 217.1 LBS | HEART RATE: 74 BPM | SYSTOLIC BLOOD PRESSURE: 147 MMHG | OXYGEN SATURATION: 100 % | HEIGHT: 63 IN

## 2024-05-09 DIAGNOSIS — D64.9 ANEMIA, UNSPECIFIED TYPE: Primary | ICD-10-CM

## 2024-05-09 DIAGNOSIS — D72.818 OTHER DECREASED WHITE BLOOD CELL (WBC) COUNT: ICD-10-CM

## 2024-05-09 LAB
ALBUMIN SERPL-MCNC: 4.3 G/DL (ref 3.5–5.2)
ALP SERPL-CCNC: 79 U/L (ref 35–104)
ALT SERPL-CCNC: 13 U/L (ref 0–32)
ANION GAP SERPL CALCULATED.3IONS-SCNC: 11 MMOL/L (ref 7–16)
AST SERPL-CCNC: 17 U/L (ref 0–31)
BASOPHILS # BLD: 0.02 K/UL (ref 0–0.2)
BASOPHILS NFR BLD: 0 % (ref 0–2)
BILIRUB SERPL-MCNC: 0.2 MG/DL (ref 0–1.2)
BUN SERPL-MCNC: 14 MG/DL (ref 6–20)
CALCIUM SERPL-MCNC: 9.6 MG/DL (ref 8.6–10.2)
CHLORIDE SERPL-SCNC: 106 MMOL/L (ref 98–107)
CO2 SERPL-SCNC: 22 MMOL/L (ref 22–29)
CREAT SERPL-MCNC: 0.9 MG/DL (ref 0.5–1)
EOSINOPHIL # BLD: 0.08 K/UL (ref 0.05–0.5)
EOSINOPHILS RELATIVE PERCENT: 1 % (ref 0–6)
ERYTHROCYTE [DISTWIDTH] IN BLOOD BY AUTOMATED COUNT: 11.9 % (ref 11.5–15)
FERRITIN SERPL-MCNC: 30 NG/ML
FOLATE SERPL-MCNC: >20 NG/ML (ref 4.8–24.2)
GFR, ESTIMATED: 78 ML/MIN/1.73M2
GLUCOSE SERPL-MCNC: 125 MG/DL (ref 74–99)
HCT VFR BLD AUTO: 43.5 % (ref 34–48)
HGB BLD-MCNC: 14.1 G/DL (ref 11.5–15.5)
IMM GRANULOCYTES # BLD AUTO: <0.03 K/UL (ref 0–0.58)
IMM GRANULOCYTES NFR BLD: 0 % (ref 0–5)
IRON SATN MFR SERPL: 23 % (ref 15–50)
IRON SERPL-MCNC: 79 UG/DL (ref 37–145)
LYMPHOCYTES NFR BLD: 1.39 K/UL (ref 1.5–4)
LYMPHOCYTES RELATIVE PERCENT: 24 % (ref 20–42)
MCH RBC QN AUTO: 31.9 PG (ref 26–35)
MCHC RBC AUTO-ENTMCNC: 32.4 G/DL (ref 32–34.5)
MCV RBC AUTO: 98.4 FL (ref 80–99.9)
MONOCYTES NFR BLD: 0.25 K/UL (ref 0.1–0.95)
MONOCYTES NFR BLD: 4 % (ref 2–12)
NEUTROPHILS NFR BLD: 69 % (ref 43–80)
NEUTS SEG NFR BLD: 3.95 K/UL (ref 1.8–7.3)
PLATELET # BLD AUTO: 244 K/UL (ref 130–450)
PMV BLD AUTO: 11.7 FL (ref 7–12)
POTASSIUM SERPL-SCNC: 4.1 MMOL/L (ref 3.5–5)
PROT SERPL-MCNC: 7.5 G/DL (ref 6.4–8.3)
RBC # BLD AUTO: 4.42 M/UL (ref 3.5–5.5)
SODIUM SERPL-SCNC: 139 MMOL/L (ref 132–146)
TIBC SERPL-MCNC: 338 UG/DL (ref 250–450)
VIT B12 SERPL-MCNC: 815 PG/ML (ref 211–946)
WBC OTHER # BLD: 5.7 K/UL (ref 4.5–11.5)

## 2024-05-09 PROCEDURE — 83550 IRON BINDING TEST: CPT

## 2024-05-09 PROCEDURE — 1036F TOBACCO NON-USER: CPT | Performed by: INTERNAL MEDICINE

## 2024-05-09 PROCEDURE — G8417 CALC BMI ABV UP PARAM F/U: HCPCS | Performed by: INTERNAL MEDICINE

## 2024-05-09 PROCEDURE — G8427 DOCREV CUR MEDS BY ELIG CLIN: HCPCS | Performed by: INTERNAL MEDICINE

## 2024-05-09 PROCEDURE — 83540 ASSAY OF IRON: CPT

## 2024-05-09 PROCEDURE — 85025 COMPLETE CBC W/AUTO DIFF WBC: CPT

## 2024-05-09 PROCEDURE — 82746 ASSAY OF FOLIC ACID SERUM: CPT

## 2024-05-09 PROCEDURE — 80053 COMPREHEN METABOLIC PANEL: CPT

## 2024-05-09 PROCEDURE — 99213 OFFICE O/P EST LOW 20 MIN: CPT | Performed by: INTERNAL MEDICINE

## 2024-05-09 PROCEDURE — 82728 ASSAY OF FERRITIN: CPT

## 2024-05-09 PROCEDURE — 3077F SYST BP >= 140 MM HG: CPT | Performed by: INTERNAL MEDICINE

## 2024-05-09 PROCEDURE — 36415 COLL VENOUS BLD VENIPUNCTURE: CPT

## 2024-05-09 PROCEDURE — 82607 VITAMIN B-12: CPT

## 2024-05-09 PROCEDURE — 3080F DIAST BP >= 90 MM HG: CPT | Performed by: INTERNAL MEDICINE

## 2024-09-18 ENCOUNTER — OFFICE VISIT (OUTPATIENT)
Dept: NEUROLOGY | Age: 44
End: 2024-09-18
Payer: COMMERCIAL

## 2024-09-18 VITALS
DIASTOLIC BLOOD PRESSURE: 87 MMHG | TEMPERATURE: 98.3 F | SYSTOLIC BLOOD PRESSURE: 130 MMHG | OXYGEN SATURATION: 100 % | HEART RATE: 64 BPM

## 2024-09-18 DIAGNOSIS — G43.709 CHRONIC MIGRAINE WITHOUT AURA WITHOUT STATUS MIGRAINOSUS, NOT INTRACTABLE: Primary | ICD-10-CM

## 2024-09-18 PROCEDURE — 64615 CHEMODENERV MUSC MIGRAINE: CPT | Performed by: NURSE PRACTITIONER

## 2024-10-27 ENCOUNTER — HOSPITAL ENCOUNTER (EMERGENCY)
Age: 44
Discharge: HOME | End: 2024-10-27
Attending: EMERGENCY MEDICINE
Payer: COMMERCIAL

## 2024-10-27 VITALS
DIASTOLIC BLOOD PRESSURE: 99 MMHG | HEART RATE: 72 BPM | BODY MASS INDEX: 37.2 KG/M2 | SYSTOLIC BLOOD PRESSURE: 143 MMHG | OXYGEN SATURATION: 100 % | RESPIRATION RATE: 16 BRPM | WEIGHT: 210 LBS | TEMPERATURE: 97.5 F

## 2024-10-27 DIAGNOSIS — G43.909 MIGRAINE WITHOUT STATUS MIGRAINOSUS, NOT INTRACTABLE, UNSPECIFIED MIGRAINE TYPE: Primary | ICD-10-CM

## 2024-10-27 PROCEDURE — 2580000003 HC RX 258: Performed by: EMERGENCY MEDICINE

## 2024-10-27 PROCEDURE — 96375 TX/PRO/DX INJ NEW DRUG ADDON: CPT

## 2024-10-27 PROCEDURE — 99284 EMERGENCY DEPT VISIT MOD MDM: CPT

## 2024-10-27 PROCEDURE — 96374 THER/PROPH/DIAG INJ IV PUSH: CPT

## 2024-10-27 PROCEDURE — 6360000002 HC RX W HCPCS: Performed by: EMERGENCY MEDICINE

## 2024-10-27 PROCEDURE — 96361 HYDRATE IV INFUSION ADD-ON: CPT

## 2024-10-27 RX ORDER — 0.9 % SODIUM CHLORIDE 0.9 %
1000 INTRAVENOUS SOLUTION INTRAVENOUS ONCE
Status: COMPLETED | OUTPATIENT
Start: 2024-10-27 | End: 2024-10-27

## 2024-10-27 RX ORDER — METOCLOPRAMIDE HYDROCHLORIDE 5 MG/ML
10 INJECTION INTRAMUSCULAR; INTRAVENOUS ONCE
Status: COMPLETED | OUTPATIENT
Start: 2024-10-27 | End: 2024-10-27

## 2024-10-27 RX ORDER — DIPHENHYDRAMINE HYDROCHLORIDE 50 MG/ML
25 INJECTION INTRAMUSCULAR; INTRAVENOUS ONCE
Status: COMPLETED | OUTPATIENT
Start: 2024-10-27 | End: 2024-10-27

## 2024-10-27 RX ADMIN — DIPHENHYDRAMINE HYDROCHLORIDE 25 MG: 50 INJECTION INTRAMUSCULAR; INTRAVENOUS at 11:55

## 2024-10-27 RX ADMIN — SODIUM CHLORIDE 1000 ML: 9 INJECTION, SOLUTION INTRAVENOUS at 11:54

## 2024-10-27 RX ADMIN — METOCLOPRAMIDE HYDROCHLORIDE 10 MG: 5 INJECTION INTRAMUSCULAR; INTRAVENOUS at 11:55

## 2024-10-27 ASSESSMENT — LIFESTYLE VARIABLES: HOW OFTEN DO YOU HAVE A DRINK CONTAINING ALCOHOL: NEVER

## 2024-10-27 NOTE — ED NOTES
Department of Emergency Medicine  FIRST PROVIDER TRIAGE NOTE             Independent MLP           10/27/24  11:11 AM EDT    Date of Encounter: 10/27/24   MRN: [id number]      HPI: Monica De La O is a 44 y.o. female who presents to the ED for Headache (Headache since 0500 with vomiting since 0700, photophobia, unable to keep migraine meds down, chills)   Reports worse than baseline.    ROS: Negative for abd pain or dizziness.    PE: Gen Appearance/Constitutional: alert  HEENT: NC/NT. PERRLA,  Airway patent.  Neck: supple  CV: regular rate  Pulm: CTA bilat  GI: soft and NT  Musculoskeletal: moves all extremities x 4  Lymphatics: no edema     Initial Plan of Care: All treatment areas with department are currently occupied.  Proceed toTreatment Area When Bed Available for ED Attending/MLP to Continue Care    Electronically signed by NADIR Tian CNP   DD: 10/27/24     Yes

## 2024-10-27 NOTE — ED PROVIDER NOTES
Patient presents emergency department with complaint of headache.  Patient has history of migraines and follows with neurology for it.  Her typical rescue meds have not been helpful as she states that she has been unable to keep them down.  She also receives Botox injections every 3 months.  She states that there is no change in this headache compared to her previous headaches.  She has had previous imaging of her head in the past.  She denies history of aneurysms.  Her associated symptoms include but are not limited to photophobia and nausea.    The history is provided by the patient.       Review of Systems   Constitutional:  Positive for activity change and appetite change. Negative for fever.   HENT:  Negative for congestion, sinus pressure and trouble swallowing.    Eyes:  Positive for photophobia. Negative for pain and visual disturbance.   Respiratory: Negative.     Cardiovascular: Negative.    Gastrointestinal:  Positive for nausea and vomiting.   Musculoskeletal: Negative.    Skin: Negative.    Neurological:  Positive for headaches. Negative for syncope, weakness, light-headedness and numbness.   Hematological: Negative.    Psychiatric/Behavioral: Negative.         Physical Exam  Vitals and nursing note reviewed.   Constitutional:       General: She is not in acute distress.     Appearance: Normal appearance. She is well-developed and normal weight. She is not ill-appearing or toxic-appearing.   HENT:      Head: Normocephalic and atraumatic.      Right Ear: Tympanic membrane, ear canal and external ear normal.      Left Ear: Tympanic membrane, ear canal and external ear normal.      Nose: Nose normal.      Mouth/Throat:      Mouth: Mucous membranes are moist.      Pharynx: Oropharynx is clear.   Eyes:      Extraocular Movements: Extraocular movements intact.      Conjunctiva/sclera: Conjunctivae normal.      Pupils: Pupils are equal, round, and reactive to light.   Neck:      Comments: No meningeal

## 2024-11-07 ENCOUNTER — OFFICE VISIT (OUTPATIENT)
Dept: ONCOLOGY | Age: 44
End: 2024-11-07
Payer: COMMERCIAL

## 2024-11-07 ENCOUNTER — HOSPITAL ENCOUNTER (OUTPATIENT)
Dept: INFUSION THERAPY | Age: 44
Discharge: HOME | End: 2024-11-07
Payer: COMMERCIAL

## 2024-11-07 VITALS
HEART RATE: 79 BPM | BODY MASS INDEX: 36.5 KG/M2 | SYSTOLIC BLOOD PRESSURE: 134 MMHG | WEIGHT: 206 LBS | TEMPERATURE: 98 F | OXYGEN SATURATION: 100 % | DIASTOLIC BLOOD PRESSURE: 95 MMHG | HEIGHT: 63 IN

## 2024-11-07 DIAGNOSIS — D64.9 ANEMIA, UNSPECIFIED TYPE: Primary | ICD-10-CM

## 2024-11-07 DIAGNOSIS — D72.818 OTHER DECREASED WHITE BLOOD CELL (WBC) COUNT: ICD-10-CM

## 2024-11-07 LAB
ALBUMIN SERPL-MCNC: 4.6 G/DL (ref 3.5–5.2)
ALP SERPL-CCNC: 90 U/L (ref 35–104)
ALT SERPL-CCNC: 19 U/L (ref 0–32)
ANION GAP SERPL CALCULATED.3IONS-SCNC: 11 MMOL/L (ref 7–16)
AST SERPL-CCNC: 20 U/L (ref 0–31)
BASOPHILS # BLD: 0.03 K/UL (ref 0–0.2)
BASOPHILS NFR BLD: 1 % (ref 0–2)
BILIRUB SERPL-MCNC: 0.4 MG/DL (ref 0–1.2)
BUN SERPL-MCNC: 13 MG/DL (ref 6–20)
CALCIUM SERPL-MCNC: 9.8 MG/DL (ref 8.6–10.2)
CHLORIDE SERPL-SCNC: 105 MMOL/L (ref 98–107)
CO2 SERPL-SCNC: 22 MMOL/L (ref 22–29)
CREAT SERPL-MCNC: 1.1 MG/DL (ref 0.5–1)
EOSINOPHIL # BLD: 0.08 K/UL (ref 0.05–0.5)
EOSINOPHILS RELATIVE PERCENT: 1 % (ref 0–6)
ERYTHROCYTE [DISTWIDTH] IN BLOOD BY AUTOMATED COUNT: 12 % (ref 11.5–15)
FERRITIN SERPL-MCNC: 35 NG/ML
FOLATE SERPL-MCNC: 14.4 NG/ML (ref 4.8–24.2)
GFR, ESTIMATED: 66 ML/MIN/1.73M2
GLUCOSE SERPL-MCNC: 107 MG/DL (ref 74–99)
HCT VFR BLD AUTO: 45.6 % (ref 34–48)
HGB BLD-MCNC: 14.5 G/DL (ref 11.5–15.5)
IMM GRANULOCYTES # BLD AUTO: <0.03 K/UL (ref 0–0.58)
IMM GRANULOCYTES NFR BLD: 0 % (ref 0–5)
IRON SATN MFR SERPL: 49 % (ref 15–50)
IRON SERPL-MCNC: 174 UG/DL (ref 37–145)
LYMPHOCYTES NFR BLD: 1.35 K/UL (ref 1.5–4)
LYMPHOCYTES RELATIVE PERCENT: 23 % (ref 20–42)
MCH RBC QN AUTO: 31.5 PG (ref 26–35)
MCHC RBC AUTO-ENTMCNC: 31.8 G/DL (ref 32–34.5)
MCV RBC AUTO: 99.1 FL (ref 80–99.9)
MONOCYTES NFR BLD: 0.32 K/UL (ref 0.1–0.95)
MONOCYTES NFR BLD: 5 % (ref 2–12)
NEUTROPHILS NFR BLD: 70 % (ref 43–80)
NEUTS SEG NFR BLD: 4.15 K/UL (ref 1.8–7.3)
PLATELET # BLD AUTO: 219 K/UL (ref 130–450)
PMV BLD AUTO: 11.9 FL (ref 7–12)
POTASSIUM SERPL-SCNC: 4.2 MMOL/L (ref 3.5–5)
PROT SERPL-MCNC: 7.9 G/DL (ref 6.4–8.3)
RBC # BLD AUTO: 4.6 M/UL (ref 3.5–5.5)
SODIUM SERPL-SCNC: 138 MMOL/L (ref 132–146)
TIBC SERPL-MCNC: 356 UG/DL (ref 250–450)
VIT B12 SERPL-MCNC: 833 PG/ML (ref 211–946)
WBC OTHER # BLD: 6 K/UL (ref 4.5–11.5)

## 2024-11-07 PROCEDURE — 36415 COLL VENOUS BLD VENIPUNCTURE: CPT

## 2024-11-07 PROCEDURE — 82607 VITAMIN B-12: CPT

## 2024-11-07 PROCEDURE — G8484 FLU IMMUNIZE NO ADMIN: HCPCS | Performed by: INTERNAL MEDICINE

## 2024-11-07 PROCEDURE — 85025 COMPLETE CBC W/AUTO DIFF WBC: CPT

## 2024-11-07 PROCEDURE — 80053 COMPREHEN METABOLIC PANEL: CPT

## 2024-11-07 PROCEDURE — 1036F TOBACCO NON-USER: CPT | Performed by: INTERNAL MEDICINE

## 2024-11-07 PROCEDURE — 83540 ASSAY OF IRON: CPT

## 2024-11-07 PROCEDURE — G8417 CALC BMI ABV UP PARAM F/U: HCPCS | Performed by: INTERNAL MEDICINE

## 2024-11-07 PROCEDURE — 3080F DIAST BP >= 90 MM HG: CPT | Performed by: INTERNAL MEDICINE

## 2024-11-07 PROCEDURE — 83550 IRON BINDING TEST: CPT

## 2024-11-07 PROCEDURE — 82728 ASSAY OF FERRITIN: CPT

## 2024-11-07 PROCEDURE — 99213 OFFICE O/P EST LOW 20 MIN: CPT | Performed by: INTERNAL MEDICINE

## 2024-11-07 PROCEDURE — G8427 DOCREV CUR MEDS BY ELIG CLIN: HCPCS | Performed by: INTERNAL MEDICINE

## 2024-11-07 PROCEDURE — 3075F SYST BP GE 130 - 139MM HG: CPT | Performed by: INTERNAL MEDICINE

## 2024-11-07 PROCEDURE — 82746 ASSAY OF FOLIC ACID SERUM: CPT

## 2024-11-07 NOTE — PROGRESS NOTES
[facility]  [address] [address]  Dept: [phone]  Loc: [phone]  Attending progress note      Reason for Visit: Iron deficiency.    Referring Physician:  NADIR Beltre CNP    PCP:  Kamari Miles APRN - CNP    History of Present Illness:      Mrs. DeL a O is a pleasant 44-year-old lady, with a past medical history significant for asthma, anxiety and depression, DM, GERD, CKD, hyperlipidemia, hypertension, and fibromyalgia, who was referred to the hematology office for evaluation of iron deficiency that was not responsive to the oral iron.  The patient's hemoglobin hematocrit are normal, but she has persistent iron deficiency, she had blood work done with Dr. Galvan, was told she needed parenteral iron infusion.  She had GI side effects with the oral iron.  The patient received the first dose of Feraheme on 1/6/2022, she had nausea for 2 days after receiving the infusion.  She received Zofran with the second dose.     The patient returns the office for a follow-up visit, she has knee and foot pain, she is unable to get to see Ortho due to insurance issues.    Review of Systems;  CONSTITUTIONAL: No fever, chills. Good appetite, and energy level.  ENMT: Eyes: No diplopia; Nose: No epistaxis. Mouth: No sore throat.  RESPIRATORY: No hemoptysis, shortness of breath, cough.   CARDIOVASCULAR: No chest pain, palpitations.  GASTROINTESTINAL: Positive for nausea, no abdominal pain, diarrhea/constipation.  GENITOURINARY: No dysuria, urinary frequency, hematuria.  NEURO: No syncope, presyncope, pos for headache.   Remainder:  ROS NEGATIVE    Past Medical History:      Diagnosis Date    Anxiety and depression     Arthritis     Asthma     Bulging lumbar disc     Cervical disc disease     Diabetes mellitus (HCC)     diet controlled    Ectopic pregnancy     Fasciitis     Right    Fibromyalgia     GERD

## 2024-12-18 ENCOUNTER — OFFICE VISIT (OUTPATIENT)
Dept: NEUROLOGY | Age: 44
End: 2024-12-18

## 2024-12-18 DIAGNOSIS — G43.709 CHRONIC MIGRAINE WITHOUT AURA WITHOUT STATUS MIGRAINOSUS, NOT INTRACTABLE: Primary | ICD-10-CM

## 2024-12-18 NOTE — PROGRESS NOTES
Botulinum Toxin Injection Procedure Note    Pre-procedure Diagnosis: Chronic migraine    Indications: same    Procedure Details   The risks, benefits, indications, potential complications, and alternatives were explained to the patient and informed consent obtained.    The following 31 injection sites were injected with botulinum toxin:    Bilateral Corrugators: 5 units each side  Procerus: 5 units  Bilateral frontalis: 10 units each side  Bilateral temporalis: 20 units each side  Bilateral occipitalis: 15 units each side  Bilateral cervical paraspinals: 10 units each side  Bilateral trapezius: 15 units each side    Total: 155 units      Botulinum toxin Lot #: [id number]  Botulinum toxin expiration date: 9/2026  Total botox units injected: 155 units  Total botox units wasted: 45 units    The patient tolerated the procedure well.    Plan:    Call with any facial muscle weakness, ptosis, neck weakness, difficulty swallowing, difficulty speaking, or diffculty breathing    Keep a careful HA diary    Return to clinic for repeat botulinum injection in 3 months.     Sean Batista APRN - CNP-AQH  7:59 AM  
Onabotulinumtoxin A (BOTOX) injection 155 Units  155 Units IntraMUSCular Once          Objective:     General appearance: alert, appears stated age and cooperative  Head: tenderness to both temples, TMJ, and b/l occipital grooves  Eyes: sclerae/conjunctivae clear  Heart: RRR  Lungs: lungs clear throughout  Extremities: no cyanosis or edema  Pulses: 2+ and symmetric  Skin: no rashes or lesions     Mental Status: Alert, oriented x4--pleasant    Appropriate attention and concentration  Intact memories and fundus of knowledge    Speech: clear  Language: appropriate    Cranial Nerves:  I: smell    II: visual acuity     II: visual fields Full    II: pupils GONZALO   III,VII: ptosis None   III,IV,VI: extraocular muscles  EOMI without nystagmus    V: mastication Normal   V: facial light touch sensation  Normal   V,VII: corneal reflex     VII: facial muscle function - upper     VII: facial muscle function - lower Normal   VIII: hearing Normal   IX: soft palate elevation  Normal   IX,X: gag reflex    XI: trapezius strength  5/5   XI: sternocleidomastoid strength 5/5   XI: neck extension strength  5/5   XII: tongue strength  Normal     Motor:  5/5 throughout  Normal bulk and normal tone   No drift or abnormal movements    Sensory:  LT normal    Coordination:   FN, FFM and MIGUELANGEL normal    Gait:  Antalgic--limps on R    DTR:   2+ throughout--deferred at R knee due to pain    No Love's     Laboratory/Radiology:     CBC with Differential:    Lab Results   Component Value Date/Time    WBC 6.0 11/07/2024 02:07 PM    RBC 4.60 11/07/2024 02:07 PM    HGB 14.5 11/07/2024 02:07 PM    HCT 45.6 11/07/2024 02:07 PM     11/07/2024 02:07 PM    MCV 99.1 11/07/2024 02:07 PM    MCH 31.5 11/07/2024 02:07 PM    MCHC 31.8 11/07/2024 02:07 PM    RDW 12.0 11/07/2024 02:07 PM    LYMPHOPCT 23 11/07/2024 02:07 PM    MONOPCT 5 11/07/2024 02:07 PM    EOSPCT 1 11/07/2024 02:07 PM    BASOPCT 1 11/07/2024 02:07 PM    MONOSABS 0.32 11/07/2024 02:07 PM

## 2025-03-18 PROBLEM — S96.011A: Status: RESOLVED | Noted: 2024-02-23 | Resolved: 2025-03-18

## 2025-05-27 RX ORDER — PROCHLORPERAZINE MALEATE 10 MG
10 TABLET ORAL EVERY 8 HOURS PRN
Qty: 90 TABLET | Refills: 0 | Status: SHIPPED | OUTPATIENT
Start: 2025-05-27

## 2025-06-02 RX ORDER — PROCHLORPERAZINE MALEATE 10 MG
10 TABLET ORAL EVERY 8 HOURS PRN
Qty: 90 TABLET | Refills: 0 | OUTPATIENT
Start: 2025-06-02

## 2025-06-10 ENCOUNTER — TELEPHONE (OUTPATIENT)
Age: 45
End: 2025-06-10

## 2025-06-12 ENCOUNTER — OFFICE VISIT (OUTPATIENT)
Age: 45
End: 2025-06-12

## 2025-06-12 VITALS
DIASTOLIC BLOOD PRESSURE: 80 MMHG | HEART RATE: 56 BPM | OXYGEN SATURATION: 98 % | TEMPERATURE: 97.6 F | SYSTOLIC BLOOD PRESSURE: 134 MMHG

## 2025-06-12 DIAGNOSIS — G43.709 CHRONIC MIGRAINE WITHOUT AURA WITHOUT STATUS MIGRAINOSUS, NOT INTRACTABLE: Primary | ICD-10-CM

## 2025-06-12 NOTE — PROGRESS NOTES
Botulinum Toxin Injection Procedure Note    Pre-procedure Diagnosis: Chronic migraine    Indications: same    Procedure Details   The risks, benefits, indications, potential complications, and alternatives were explained to the patient and informed consent obtained.    The following 31 injection sites were injected with botulinum toxin:    Bilateral Corrugators: 5 units each side  Procerus: 5 units  Bilateral frontalis: 10 units each side  Bilateral temporalis: 20 units each side  Bilateral occipitalis: 15 units each side  Bilateral cervical paraspinals: 10 units each side  Bilateral trapezius: 15 units each side    Total: 155 units      Botulinum toxin Lot #: [id number]  Botulinum toxin expiration date: 10/2027  Total botox units injected: 155 units  Total botox units wasted: 45 units    The patient tolerated the procedure well.    Plan:    -Call with any facial muscle weakness, ptosis, neck weakness, difficulty swallowing, difficulty speaking, or diffculty breathing    -Keep a careful HA diary    -Return to clinic for repeat botulinum injection in 3 months.     NADIR Brewer - CNP-AQH  11:43 AM

## 2025-06-23 RX ORDER — PROCHLORPERAZINE MALEATE 10 MG
10 TABLET ORAL EVERY 8 HOURS PRN
Qty: 90 TABLET | Refills: 0 | Status: SHIPPED | OUTPATIENT
Start: 2025-06-23

## 2025-06-23 NOTE — TELEPHONE ENCOUNTER
Last seen 6/12/2025  Next appt 9/11/2025    Requested Prescriptions     Pending Prescriptions Disp Refills    prochlorperazine (COMPAZINE) 10 MG tablet [Pharmacy Med Name: Prochlorperazine Maleate 10MG TABS] 90 tablet 0     Sig: TAKE 1 TABLET BY MOUTH EVERY 8 HOURS AS NEEDED (MIGRAINE, NAUSEA)      Plan:      -Continue Botox  -Continue Ubrelvy 100 mg PRN--extra samples given  -HA diary     RTO for Botox treatments--routine follow ups yearly     NADIR Brewer - CNP, [facility]  10:12 AM  4/20/2023

## 2025-08-13 DIAGNOSIS — G43.709 CHRONIC MIGRAINE WITHOUT AURA WITHOUT STATUS MIGRAINOSUS, NOT INTRACTABLE: ICD-10-CM

## 2025-08-13 RX ORDER — UBROGEPANT 100 MG/1
100 TABLET ORAL DAILY PRN
Qty: 10 TABLET | Refills: 11 | Status: SHIPPED | OUTPATIENT
Start: 2025-08-13

## (undated) DEVICE — NEEDLE HYPO 18GA L1.5IN PNK POLYPR HUB S STL THN WALL FILL

## (undated) DEVICE — GAUZE,SPONGE,4"X4",16PLY,STRL,LF,10/TRAY: Brand: MEDLINE

## (undated) DEVICE — INTENDED FOR TISSUE SEPARATION, AND OTHER PROCEDURES THAT REQUIRE A SHARP SURGICAL BLADE TO PUNCTURE OR CUT.: Brand: BARD-PARKER ® STAINLESS STEEL BLADES

## (undated) DEVICE — SUTURE PROL SZ 3-0 L18IN NONABSORBABLE BLU L19MM PS-2 3/8 8687H

## (undated) DEVICE — MARKER,SKIN,WI/RULER AND LABELS: Brand: MEDLINE

## (undated) DEVICE — TUBING, SUCTION, 1/4" X 10', STRAIGHT: Brand: MEDLINE

## (undated) DEVICE — GAUZE,SPONGE,4"X4",16PLY,XRAY,STRL,LF: Brand: MEDLINE

## (undated) DEVICE — PADDING CAST W4INXL4YD NONSTERILE COT COHESIVE HND TEARABLE

## (undated) DEVICE — PEN: MARKING STD 100/CS: Brand: MEDICAL ACTION INDUSTRIES

## (undated) DEVICE — DRESSING ALG W2XL5IN ANTIMIC WND JUMPSTART

## (undated) DEVICE — SMARTGOWN BREATHABLE SPECIALTY GOWN: Brand: CONVERTORS

## (undated) DEVICE — 3 BONE CEMENT MIXER: Brand: MIXEVAC

## (undated) DEVICE — SYRINGE MED 10ML TRNSLUC BRL PLUNG BLK MRK POLYPR CTRL

## (undated) DEVICE — YANKAUER,OPEN TIP,W/O VENT,STERILE: Brand: MEDLINE INDUSTRIES, INC.

## (undated) DEVICE — 3M™ STERI-DRAPE™ U-DRAPE, LONG 1019: Brand: STERI-DRAPE™

## (undated) DEVICE — SUPER TURBOVAC 90 INTEGRATED CABLE WAND ICW: Brand: COBLATION

## (undated) DEVICE — NEEDLE HYPO 25GA L1.5IN BLU POLYPR HUB S STL REG BVL STR

## (undated) DEVICE — GLOVE ORANGE PI 8   MSG9080

## (undated) DEVICE — BASIC PACK: Brand: CONVERTORS

## (undated) DEVICE — 1810 FOAM BLOCK NEEDLE COUNTER: Brand: DEVON

## (undated) DEVICE — MEDI-VAC NON-CONDUCTIVE SUCTION TUBING: Brand: CARDINAL HEALTH

## (undated) DEVICE — GOWN,SIRUS,FABRNF,XL,20/CS: Brand: MEDLINE

## (undated) DEVICE — BLADE CLIPPER GEN PURP NS

## (undated) DEVICE — SOLUTION IV IRRIG POUR BRL 0.9% SODIUM CHL 2F7124

## (undated) DEVICE — 3M™ COBAN™ SELF-ADHERENT WRAP, 1586S, STERILE, 6 IN X 5 YD (15 CM X 4,5 M), 12 ROLLS/CASE: Brand: 3M™ COBAN™

## (undated) DEVICE — ELECTRODE PT RET AD L9FT HI MOIST COND ADH HYDRGEL CORDED

## (undated) DEVICE — NEEDLE SPNL L3.5IN PNK HUB S STL REG WALL FIT STYL W/ QNCKE

## (undated) DEVICE — SOLUTION IRRIG 1000ML 09% SOD CHL USP PIC PLAS CONTAINER

## (undated) DEVICE — DRAPE 84X54IN RADIOLOGY C ARM KEYBOARD

## (undated) DEVICE — STANDARD HYPODERMIC NEEDLE,POLYPROPYLENE HUB: Brand: MONOJECT

## (undated) DEVICE — DRILL,  2.4 X 140MM, SOLID, MEASURING, LONG, AO: Brand: BABY GORILLA®/GORILLA® PLATING SYSTEM

## (undated) DEVICE — REAMER SURG DIA8.5MM PEEK FORKED TIP PILOTED HD W/ BLT IN

## (undated) DEVICE — SUTURE PROL SZ 2-0 L30IN NONABSORBABLE BLU L26MM SH 1/2 CIR 8833H

## (undated) DEVICE — TUBING PMP L16FT MAIN DISP FOR AR-6400 AR-6475

## (undated) DEVICE — PAD,NON-ADHERENT,3X8,STERILE,LF,1/PK: Brand: MEDLINE

## (undated) DEVICE — DRESSING GZ XRFRM 4X4(25/BX 6BX/CS)

## (undated) DEVICE — TOWEL,OR,DSP,ST,BLUE,STD,6/PK,12PK/CS: Brand: MEDLINE

## (undated) DEVICE — NEEDLE HYPO 18GA L1.5IN PNK POLYPR HUB S STL REG BVL STR

## (undated) DEVICE — COVER,TABLE,60X90,STERILE: Brand: MEDLINE

## (undated) DEVICE — NEPTUNE E-SEP SMOKE EVACUATION PENCIL, COATED, 70MM BLADE, PUSH BUTTON SWITCH: Brand: NEPTUNE E-SEP

## (undated) DEVICE — 3M™ MEDIPORE™ SOFT CLOTH TAPE, 4 INCH X 10 YARDS, 12 ROLLS/CASE, 2964: Brand: 3M™ MEDIPORE™

## (undated) DEVICE — TUBING SUCT 12FR MAL ALUM SHFT FN CAP VENT UNIV CONN W/ OBT

## (undated) DEVICE — PACK,EXTREMITY,ORTHOMAX,5/CS: Brand: MEDLINE

## (undated) DEVICE — Z CONVERTED USE 2275207 CLOTH PREP W7.5XL7.5IN 2% CHG SKIN ALC AND RNS FREE

## (undated) DEVICE — BANDAGE COMPR W6INXL12FT SMOOTH FOR LIMB EXSANG ESMARCH

## (undated) DEVICE — SPONGE LAP W18XL18IN WHT COT 4 PLY FLD STRUNG RADPQ DISP ST 2 PER PACK

## (undated) DEVICE — BANDAGE COMPR W6XL12FT SGL LAYERED NO CLSR EXSANGUATION

## (undated) DEVICE — GARMENT COMPR STD FOR 17IN CALF UNIF THER FLOTRN

## (undated) DEVICE — PRECISION THIN (9.0 X 0.38 X 31.0MM)

## (undated) DEVICE — SHEET DRAPE FULL 70X100

## (undated) DEVICE — SYRINGE IRRIG 60ML SFT PLIABLE BLB EZ TO GRP 1 HND USE W/

## (undated) DEVICE — 5-IN-1 BARBED CONNECTOR POLYPROPYLENE 3/16 - 9/16 IN. (5 - 14.3 MM): Brand: ARGYLE

## (undated) DEVICE — CHLORAPREP 26ML ORANGE

## (undated) DEVICE — COUNTER NDL W1.5XL2.5IN 10 COUNT

## (undated) DEVICE — NEEDLE FLTR 18GA L1.5IN MEM THK5UM BLNT DISP

## (undated) DEVICE — SLEEVE TRAC SPANDEX LAT W/ 4IN COBAN SUPERFICIAL RAD NRV PD

## (undated) DEVICE — 3M™ IOBAN™ 2 ANTIMICROBIAL INCISE DRAPE 6640EZ: Brand: IOBAN™ 2

## (undated) DEVICE — PEEL-AWAY HOOD: Brand: FLYTE, SURGICOOL

## (undated) DEVICE — CANNULA IV 18GA L15IN BLNT FILL LUERLOCK HUB MJCT

## (undated) DEVICE — GOWN,SIRUS,POLYRNF,RAGLAN,XL,ST,30/CS: Brand: MEDLINE

## (undated) DEVICE — GLOVE SURG SZ 75 L12IN FNGR THK94MIL STD WHT LTX FREE

## (undated) DEVICE — CUFF TOURNIQUET 44 SNG BLADDER DUAL PORT

## (undated) DEVICE — PROBE ABLAT 90DEG ASPIR MULTIPORT BPLR RF 1 PC ELECTRD ERGO

## (undated) DEVICE — KIT INSTR DRL GUID 1.6/1.35MM GUIDWIRE DISP FIBERTAK DX

## (undated) DEVICE — APPLICATOR MEDICATED 26 CC SOLUTION HI LT ORNG CHLORAPREP

## (undated) DEVICE — HOOK LOCK LATEX FREE ELASTIC BANDAGE 3INX5YD

## (undated) DEVICE — STERILE VELCLOSE ELASTIC BANDAGE, 4IN X 10 YARDS: Brand: VELCLOSE

## (undated) DEVICE — DOUBLE BASIN SET: Brand: MEDLINE INDUSTRIES, INC.

## (undated) DEVICE — PENCIL ES L3M BTTN SWCH HOLSTER W/ BLDE ELECTRD EDGE

## (undated) DEVICE — BLADE SHV L13CM DIA4MM TAPR TIP SCIS LIKE CUT OVL OUTER

## (undated) DEVICE — HANDPIECE SET WITH BONE CLEANING TIP: Brand: INTERPULSE

## (undated) DEVICE — TOWEL OR BLUEE 16X26IN ST 8 PACK ORB08 16X26ORTWL

## (undated) DEVICE — GARMENT,MEDLINE,DVT,INT,CALF,MED, GEN2: Brand: MEDLINE

## (undated) DEVICE — CART DISP LID TRANSPOSAL

## (undated) DEVICE — DRAPE,SHOULDER,ORTHOMAX,W/POUCH,5/CS: Brand: MEDLINE

## (undated) DEVICE — SPONGE LAP W18XL18IN WHT COT 4 PLY FLD STRUNG RADPQ DISP ST

## (undated) DEVICE — SOLUTION SCRB 32OZ 7.5% POVIDONE IOD BTL GENTLE EFFECTIVE

## (undated) DEVICE — 12 ML SYRINGE,LUER-LOCK TIP: Brand: MONOJECT

## (undated) DEVICE — PRECISION THIN (9.0 X 0.38 X 25.0MM)

## (undated) DEVICE — SYRINGE MED 50ML LUERLOCK TIP

## (undated) DEVICE — COVER,LIGHT HANDLE,FLX,1/PK: Brand: MEDLINE INDUSTRIES, INC.

## (undated) DEVICE — DRESSING HYDROFIBER AQUACEL AG ADVANTAGE 3.5X12 IN

## (undated) DEVICE — PACK PROC ORTH LO EXT IX CUST

## (undated) DEVICE — SHEET SUPPORT

## (undated) DEVICE — K-WIRE, SINGLE ENDED TROCAR TIP, SMOOTH, 2.0 X 150MM
Type: IMPLANTABLE DEVICE | Site: FOOT | Status: NON-FUNCTIONAL
Brand: MULTI SYSTEM
Removed: 2024-02-23

## (undated) DEVICE — BANDAGE COMPR W6INXL5YD SELF ADH COHESIVE CO FLX

## (undated) DEVICE — DRAPE SURG W88XL116IN SMS BODY SPL ORTH N FEN REINF FLD PCH

## (undated) DEVICE — PITCHER PT 1200ML W HNDL CSR WRP

## (undated) DEVICE — BLADE SHV L13CM DIA4MM DBL CUT COOLCUT

## (undated) DEVICE — 3M™ STERI-DRAPE™ U-DRAPE 1015: Brand: STERI-DRAPE™

## (undated) DEVICE — NDL CNTR 40CT FM MAG: Brand: MEDLINE INDUSTRIES, INC.

## (undated) DEVICE — CUFF TOURNIQUET 34 SNG BLADDER DUAL PORT

## (undated) DEVICE — HAND SET

## (undated) DEVICE — SURG GL,SENSICARE PI ORTHO LT,LF,PF,8.0: Brand: MEDLINE

## (undated) DEVICE — SYRINGE BLB 50CC IRRIG PLIABLE FNGR FLNG GRAD FLSK DISP

## (undated) DEVICE — CLOTH PREP W7.5XL7.5IN 2% CHG SKIN ALC AND RNS FREE

## (undated) DEVICE — SOLUTION IRRIG 500ML 0.9% SOD CHLO USP POUR PLAS BTL

## (undated) DEVICE — SUTURE SUTTAPE L40IN DIA1.3MM NONABSORBABLE WHT BLU L26.5MM AR7500

## (undated) DEVICE — GLOVE ORANGE PI 7 1/2   MSG9075

## (undated) DEVICE — SOLUTION IRRIG 1000ML 0.9% SOD CHL USP POUR PLAS BTL

## (undated) DEVICE — TIBURON EXTREMITY SHEET: Brand: CONVERTORS

## (undated) DEVICE — SLING ARM 9 1/2X20IN L MULTIPAK

## (undated) DEVICE — SOLUTION IV 1000ML 0.9% SOD CHL PH 5 INJ USP VIAFLX PLAS

## (undated) DEVICE — BANDAGE COMPR L W4INXL11YD 100% COT WVN E DBL LEN CLP CLSR

## (undated) DEVICE — OLIVE WIRE, THREADED, 1.4MM
Type: IMPLANTABLE DEVICE | Site: FOOT | Status: NON-FUNCTIONAL
Brand: BABY GORILLA/GORILLA PLATING SYSTEM
Removed: 2024-02-23

## (undated) DEVICE — GOWN SURG XL LNG LEN SPUNBOND REINF VELC TIE LEV 4 IMPERV

## (undated) DEVICE — HANDLE CVR PATENTED RETENTION DISC STRL LIGHT SHLD

## (undated) DEVICE — GOWN,SIRUS,NONRNF,SETINSLV,XL,20/CS: Brand: MEDLINE

## (undated) DEVICE — CLOTH SURG PREP PREOPERATIVE CHLORHEXIDINE GLUC 2% READYPREP

## (undated) DEVICE — ZIMMER® STERILE DISPOSABLE TOURNIQUET CUFF WITH PROTECTIVE SLEEVE AND PLC, DUAL PORT, SINGLE BLADDER, 42 IN. (107 CM)

## (undated) DEVICE — SMALL TEAR CROSS CUT RASP (11.0 X 5.0MM)

## (undated) DEVICE — 3M™ IOBAN™ 2 ANTIMICROBIAL INCISE DRAPE 6650EZ: Brand: IOBAN™ 2

## (undated) DEVICE — STAPLER SKIN H3.9MM WIRE DIA0.58MM CRWN 6.9MM 35 STPL ROT